# Patient Record
Sex: FEMALE | Race: WHITE | NOT HISPANIC OR LATINO | Employment: OTHER | URBAN - METROPOLITAN AREA
[De-identification: names, ages, dates, MRNs, and addresses within clinical notes are randomized per-mention and may not be internally consistent; named-entity substitution may affect disease eponyms.]

---

## 2017-01-19 ENCOUNTER — TELEPHONE (OUTPATIENT)
Dept: SPORTS MEDICINE | Facility: CLINIC | Age: 67
End: 2017-01-19

## 2017-01-19 NOTE — TELEPHONE ENCOUNTER
----- Message from Erica Gill MA sent at 1/19/2017  8:16 AM CST -----  Contact: self   Pt is requesting that she get a letter stating that her ARTHROPLASTY-SHOULDER-REVERSE [1679]  REPAIR-TENDON-BICEP and is needing a explanation on why it medically neccessary  For secondary insurance which is [employer] and if it was sent over to the insurance company. REFERENCE NUMBER [id number] Rep is Delfina Michi- phone [phone] fax [phone].    Pt can be reached at [phone].

## 2017-01-19 NOTE — TELEPHONE ENCOUNTER
I left the patient a message asking her to reach out to her insurance company to ensure they have sent the appropriate paperwork to our office regarding this matter. I provided her the phone and fax number and address for the paperwork to be sent to. I encouraged her to call back with any questions

## 2017-02-20 ENCOUNTER — OFFICE VISIT (OUTPATIENT)
Dept: OPTOMETRY | Facility: CLINIC | Age: 67
End: 2017-02-20
Payer: COMMERCIAL

## 2017-02-20 ENCOUNTER — OFFICE VISIT (OUTPATIENT)
Dept: OPTOMETRY | Facility: CLINIC | Age: 67
End: 2017-02-20
Payer: MEDICARE

## 2017-02-20 DIAGNOSIS — E11.9 TYPE 2 DIABETES MELLITUS WITHOUT RETINOPATHY: Primary | ICD-10-CM

## 2017-02-20 DIAGNOSIS — Z46.0 FITTING AND ADJUSTMENT OF SPECTACLES AND CONTACT LENSES: Primary | ICD-10-CM

## 2017-02-20 DIAGNOSIS — H25.13 NUCLEAR SCLEROSIS, BILATERAL: ICD-10-CM

## 2017-02-20 PROCEDURE — 99212 OFFICE O/P EST SF 10 MIN: CPT | Mod: PBBFAC,PO | Performed by: OPTOMETRIST

## 2017-02-20 PROCEDURE — 92004 COMPRE OPH EXAM NEW PT 1/>: CPT | Mod: S$PBB,,, | Performed by: OPTOMETRIST

## 2017-02-20 PROCEDURE — 92310 CONTACT LENS FITTING OU: CPT | Mod: ,,, | Performed by: OPTOMETRIST

## 2017-02-20 PROCEDURE — 99999 PR PBB SHADOW E&M-EST. PATIENT-LVL II: CPT | Mod: PBBFAC,,, | Performed by: OPTOMETRIST

## 2017-02-20 NOTE — PROGRESS NOTES
HPI     Contact Lens Fit    Additional comments: Present for stronger CL & IDDM            Diabetic Eye Exam    Additional comments: IDDM x 10 years.           Comments   New Patient present for CL Fit. Patient states she may need something   stronger for reading, current prescription is not working well. Patient   states I do a lot of computer and I have difficulty there also.     Current Brand/Rx  AV Extended Wear  +1.50 sph  +4.50 sph  Sleeps in lenses, removes q3w & changes, ha solution.    1. IDDM x 10 years       Last edited by Elisa Pride MA on 2/20/2017 10:11 AM. (History)        ROS     Negative for: Constitutional, Gastrointestinal, Neurological, Skin,   Genitourinary, Musculoskeletal, HENT, Endocrine, Cardiovascular, Eyes,   Respiratory, Psychiatric, Allergic/Imm, Heme/Lymph    Last edited by Dimas Black, OZZY on 2/20/2017 10:28 AM. (History)        Assessment /Plan     For exam results, see Encounter Report.    Type 2 diabetes mellitus without retinopathy    Nuclear sclerosis, bilateral      1. No diabetic retinopathy, no csme. Return in 1 year for dilated eye exam.  2. Educated pt on presence of cataracts and effects on vision. No surgery at this time. Recheck in one year.

## 2017-02-27 ENCOUNTER — TELEPHONE (OUTPATIENT)
Dept: OPTOMETRY | Facility: CLINIC | Age: 67
End: 2017-02-27

## 2017-02-27 NOTE — TELEPHONE ENCOUNTER
Called and talked to Pt but she had received a phone call from a nurse already. I had checked the chart and could not find any documentation for that.

## 2017-03-14 ENCOUNTER — OFFICE VISIT (OUTPATIENT)
Dept: OPTOMETRY | Facility: CLINIC | Age: 67
End: 2017-03-14
Payer: MEDICARE

## 2017-03-14 DIAGNOSIS — H52.03 HYPEROPIA WITH PRESBYOPIA, BILATERAL: ICD-10-CM

## 2017-03-14 DIAGNOSIS — H52.4 HYPEROPIA WITH PRESBYOPIA, BILATERAL: ICD-10-CM

## 2017-03-14 DIAGNOSIS — H10.13 ALLERGIC CONJUNCTIVITIS, BILATERAL: Primary | ICD-10-CM

## 2017-03-14 PROCEDURE — 99499 UNLISTED E&M SERVICE: CPT | Mod: S$PBB,,, | Performed by: OPTOMETRIST

## 2017-03-14 NOTE — PROGRESS NOTES
HPI     Blur ou at dist/near, x mos, no assoc pain or red, no relief over time,   constant  Itchy red eyes OU       Last edited by Dimas Black, OD on 3/14/2017  2:01 PM.     ROS     Negative for: Constitutional, Gastrointestinal, Neurological, Skin,   Genitourinary, Musculoskeletal, HENT, Endocrine, Cardiovascular, Eyes,   Respiratory, Psychiatric, Allergic/Imm, Heme/Lymph    Last edited by Dimas Black, OD on 3/14/2017  1:38 PM. (History)        Assessment /Plan     For exam results, see Encounter Report.    Allergic conjunctivitis, bilateral    Hyperopia with presbyopia, bilateral      1. Recommended OTC Zaditor bid OU for itching  2.  Contact lens trials dispensed to pt. Daily wear only advised, with education to risks of extended wear.  Discussed lens care, compliance and solutions.  RTC 2 weeks contact lens follow up.              Addend 3/17/17 pt wants to go back to air optix night and day which she previously wore with success.

## 2018-04-26 ENCOUNTER — TELEPHONE (OUTPATIENT)
Dept: SPORTS MEDICINE | Facility: CLINIC | Age: 68
End: 2018-04-26

## 2018-05-16 ENCOUNTER — HOSPITAL ENCOUNTER (OUTPATIENT)
Dept: RADIOLOGY | Facility: HOSPITAL | Age: 68
Discharge: HOME | End: 2018-05-16
Attending: ORTHOPAEDIC SURGERY
Payer: MEDICARE

## 2018-05-16 ENCOUNTER — OFFICE VISIT (OUTPATIENT)
Dept: SPORTS MEDICINE | Facility: CLINIC | Age: 68
End: 2018-05-16
Payer: MEDICARE

## 2018-05-16 VITALS
SYSTOLIC BLOOD PRESSURE: 109 MMHG | HEIGHT: 61 IN | DIASTOLIC BLOOD PRESSURE: 71 MMHG | HEART RATE: 64 BPM | BODY MASS INDEX: 34.89 KG/M2 | WEIGHT: 184.81 LBS

## 2018-05-16 DIAGNOSIS — G89.29 CHRONIC PAIN OF BOTH SHOULDERS: Primary | ICD-10-CM

## 2018-05-16 DIAGNOSIS — M25.511 CHRONIC PAIN OF BOTH SHOULDERS: Primary | ICD-10-CM

## 2018-05-16 DIAGNOSIS — M25.512 CHRONIC PAIN OF BOTH SHOULDERS: Primary | ICD-10-CM

## 2018-05-16 DIAGNOSIS — M25.511 CHRONIC PAIN OF BOTH SHOULDERS: ICD-10-CM

## 2018-05-16 DIAGNOSIS — G89.29 CHRONIC PAIN OF BOTH SHOULDERS: ICD-10-CM

## 2018-05-16 DIAGNOSIS — M25.512 CHRONIC PAIN OF BOTH SHOULDERS: ICD-10-CM

## 2018-05-16 PROCEDURE — 99999 PR PBB SHADOW E&M-EST. PATIENT-LVL III: CPT | Mod: PBBFAC,,, | Performed by: ORTHOPAEDIC SURGERY

## 2018-05-16 PROCEDURE — 99214 OFFICE O/P EST MOD 30 MIN: CPT | Mod: S$PBB,,, | Performed by: ORTHOPAEDIC SURGERY

## 2018-05-16 PROCEDURE — 99213 OFFICE O/P EST LOW 20 MIN: CPT | Mod: PBBFAC,PO | Performed by: ORTHOPAEDIC SURGERY

## 2018-05-16 NOTE — PROGRESS NOTES
CC:  s/p L shoulder reverse.       Doing well overall.  0/10.  doing well   100% better since surgery with pain.     2/11/16  left  1. Reverse total shoulder arthroplasty.   2. Shoulder biceps tenodesis.   3. Shoulder loose body removal        PAST MEDICAL HISTORY:        Past Medical History   Diagnosis Date    Allergy      Arthritis      Asthma      Depression      Diabetes mellitus      GERD (gastroesophageal reflux disease)      Hypercholesteremia      Hypertension      Insomnia        PAST SURGICAL HISTORY:          Past Surgical History   Procedure Laterality Date    Hysterectomy        Tonsillectomy        Knee arthroscopy w/ debridement Left         x3    Wrist reconstruction Right         tendon    Lipoma resection           fatty tumor between breast    Anal fissure surgery        Shoulder surgery Left 2/11/2016       Dr Burris       FAMILY HISTORY:         Family History   Problem Relation Age of Onset    Arthritis Mother      Hypertension Mother      Arthritis Father      Diabetes Father      Hypertension Father        SOCIAL HISTORY:   Social History   History            Social History    Marital status:        Spouse name: N/A    Number of children: N/A    Years of education: N/A           Occupational History    [employer]              Social History Main Topics     Smoking status: Never Smoker     Smokeless tobacco: Never Used     Alcohol use: 0.6 oz/week       1 Shots of liquor per week         Comment: socially     Drug use: No     Sexual activity: No            Other Topics Concern    Not on file      Social History Narrative            MEDICATIONS:   Current Outpatient Prescriptions:     calcium carbonate-vitamin D3 (CALCIUM 600 + D,3,) 600 mg calcium- 200 unit Cap, Take 600 mg by mouth every evening., Disp: 90 capsule, Rfl: 0    citalopram (CELEXA) 10 MG tablet, Take 1 tablet (10 mg total) by mouth once daily., Disp: 90  tablet, Rfl: 0    fenofibrate (TRICOR) 145 MG tablet, Take 1 tablet (145 mg total) by mouth once daily., Disp: 90 tablet, Rfl: 0    fish oil-omega-3 fatty acids 300-1,000 mg capsule, Take 1 capsule (1 g total) by mouth once daily., Disp: 90 capsule, Rfl: 0    fluticasone-salmeterol 250-50 mcg/dose (ADVAIR DISKUS) 250-50 mcg/dose diskus inhaler, Inhale 1 puff into the lungs 2 (two) times daily., Disp: 60 each, Rfl: 1    glucosamine-chondroitin 500-400 mg tablet, Take 1 tablet by mouth once daily., Disp: 90 tablet, Rfl: 0    insulin glargine (LANTUS SOLOSTAR) 100 unit/mL (3 mL) InPn pen, Inject 55 Units into the skin every evening., Disp: 15 mL, Rfl: 2    insulin lispro protam-lispro (HUMALOG MIX 75-25 KWIKPEN) 100 unit/mL (75-25) InPn, Inject 10 Units into the skin 2 times daily 2 hours after meal., Disp: 1 Syringe, Rfl: 2    ipratropium (ATROVENT HFA) 17 mcg/actuation inhaler, Inhale 2 puffs into the lungs 2 (two) times daily as needed for Wheezing., Disp: 12.9 g, Rfl: 2    lisinopril 10 MG tablet, TAKE 1 TABLET BY MOUTH ONCE DAILY, Disp: 90 tablet, Rfl: 0    omeprazole (PRILOSEC) 40 MG capsule, Take 1 capsule (40 mg total) by mouth once daily., Disp: 90 capsule, Rfl: 0    sitagliptan-metformin (JANUMET)  mg per tablet, Take 1 tablet by mouth once daily., Disp: 90 tablet, Rfl: 0    tramadol (ULTRAM) 50 mg tablet, Take 2 tablets (100 mg total) by mouth every 6 (six) hours as needed for Pain., Disp: 60 tablet, Rfl: 0    trazodone (DESYREL) 50 MG tablet, Take 2 tablets (100 mg total) by mouth every evening., Disp: 90 tablet, Rfl: 0    trazodone (DESYREL) 50 MG tablet, Take 1 tablet (50 mg total) by mouth nightly., Disp: 30 tablet, Rfl: 2    VYTORIN 10-40 10-40 mg per tablet, TAKE 1 TABLET BY MOUTH EVERY EVENING, Disp: 90 tablet, Rfl: 0  ALLERGIES:   Allergies   Allergen Reactions    Aspirin Hives    Ibuprofen Anaphylaxis    Aleve [Naproxen Sodium] Hives    Iodine Other (See Comments)       WHEN  INJECTED- pt  states she coded    Pcn [Penicillins] Rash         VITAL SIGNS:        Visit Vitals    /67    Pulse (!) 57    Temp 97.8 °F (36.6 °C) (Oral)    Ht 5' (1.524 m)    Wt 81.6 kg (180 lb)    BMI 35.15 kg/m2               Incision CDI  Skin W&D     AROM:  Flexion: 160  External: 35  Internal: L4  5/5 at 0 and 30        Assessment:    Appropriate recovery doing well s/p reverse        Plan:  RTC 1 year w xrays    CC: right Shoulder pain    67 y.o. Female reports that the pain is severe and not responding to any conservative care.      She reports that the pain is worse with overhead activity. It also bothers her at night.      PAST MEDICAL HISTORY:   Past Medical History:   Diagnosis Date    Allergy     Arthritis     Asthma     Depression     Diabetes mellitus     GERD (gastroesophageal reflux disease)     Hypercholesteremia     Hypertension     Insomnia      PAST SURGICAL HISTORY:   Past Surgical History:   Procedure Laterality Date    anal fissure surgery      HYSTERECTOMY      KNEE ARTHROSCOPY W/ DEBRIDEMENT Left     x3    LIPOMA RESECTION      fatty tumor between breast    SHOULDER SURGERY Left 2/11/2016    Dr Burris     TONSILLECTOMY      WRIST RECONSTRUCTION Right     tendon     FAMILY HISTORY:   Family History   Problem Relation Age of Onset    Arthritis Mother     Hypertension Mother     Arthritis Father     Diabetes Father     Hypertension Father      SOCIAL HISTORY:   Social History     Social History    Marital status:      Spouse name: N/A    Number of children: N/A    Years of education: N/A     Occupational History    [employer] [employer] [facility]     Social History Main Topics    Smoking status: Never Smoker    Smokeless tobacco: Never Used    Alcohol use 0.6 oz/week     1 Shots of liquor per week      Comment: socially    Drug use: No    Sexual activity: No     Other Topics Concern    Not on file     Social History Narrative     "No narrative on file       MEDICATIONS:   Current Outpatient Prescriptions:     citalopram (CELEXA) 10 MG tablet, Take 1 tablet (10 mg total) by mouth once daily., Disp: 90 tablet, Rfl: 1    ezetimibe-simvastatin 10-40 mg (VYTORIN 10-40) 10-40 mg per tablet, Take 1 tablet by mouth every evening., Disp: 90 tablet, Rfl: 1    fenofibrate (TRICOR) 145 MG tablet, Take 1 tablet (145 mg total) by mouth once daily., Disp: 90 tablet, Rfl: 1    fish oil-omega-3 fatty acids 300-1,000 mg capsule, Take 1 capsule (1 g total) by mouth once daily., Disp: 90 capsule, Rfl: 0    fluticasone-salmeterol 250-50 mcg/dose (ADVAIR DISKUS) 250-50 mcg/dose diskus inhaler, Inhale 1 puff into the lungs 2 (two) times daily., Disp: 60 each, Rfl: 1    glucosamine-chondroitin 500-400 mg tablet, Take 1 tablet by mouth once daily., Disp: 90 tablet, Rfl: 0    insulin aspart protamine-insulin aspart (NOVOLOG 70/30) 100 unit/mL (70-30) InPn pen, Inject 10 Units into the skin 2 (two) times daily before meals., Disp: 9 mL, Rfl: 0    insulin aspart U-100 (NOVOLOG U-100 INSULIN ASPART) 100 unit/mL injection, 10 unit sq bid, Disp: 10 mL, Rfl: 3    insulin glargine (LANTUS SOLOSTAR U-100 INSULIN) 100 unit/mL (3 mL) InPn pen, Inject 55 Units into the skin every evening., Disp: 15 mL, Rfl: 4    ipratropium (ATROVENT HFA) 17 mcg/actuation inhaler, Inhale 2 puffs into the lungs 2 (two) times daily as needed for Wheezing., Disp: 12.9 g, Rfl: 4    lisinopril 10 MG tablet, Take 1 tablet (10 mg total) by mouth once daily., Disp: 90 tablet, Rfl: 1    omeprazole (PRILOSEC) 40 MG capsule, Take 1 capsule (40 mg total) by mouth once daily., Disp: 90 capsule, Rfl: 1    pen needle, diabetic 31 gauge x 5/16" Ndle, 1 Units by Misc.(Non-Drug; Combo Route) route once daily., Disp: 100 each, Rfl: 2    SITagliptan-metformin (JANUMET)  mg per tablet, Take 1 tablet by mouth once daily., Disp: 90 tablet, Rfl: 1    traMADol (ULTRAM) 50 mg tablet, Take 2 tablets " "(100 mg total) by mouth daily as needed for Pain., Disp: 60 tablet, Rfl: 0    traZODone (DESYREL) 50 MG tablet, TAKE 2 TABLETS(100 MG) BY MOUTH EVERY EVENING, Disp: 90 tablet, Rfl: 1    calcium carbonate-vitamin D3 (CALCIUM 600 + D,3,) 600 mg calcium- 200 unit Cap, Take 600 mg by mouth every evening., Disp: 90 capsule, Rfl: 0  ALLERGIES:   Review of patient's allergies indicates:   Allergen Reactions    Aspirin Hives    Ibuprofen Anaphylaxis    Aleve [naproxen sodium] Hives    Iodine Other (See Comments)     WHEN INJECTED- pt  states she coded    Pcn [penicillins] Rash       VITAL SIGNS: /71   Pulse 64   Ht 5' 1" (1.549 m)   Wt 83.8 kg (184 lb 12.8 oz)   BMI 34.92 kg/m²      Review of Systems   Constitution: Negative. Negative for chills, fever and night sweats.   HENT: Negative for congestion and headaches.    Eyes: Negative for blurred vision, left vision loss and right vision loss.   Cardiovascular: Negative for chest pain and syncope.   Respiratory: Negative for cough and shortness of breath.    Endocrine: Negative for polydipsia, polyphagia and polyuria.   Hematologic/Lymphatic: Negative for bleeding problem. Does not bruise/bleed easily.   Skin: Negative for dry skin, itching and rash.   Musculoskeletal: Negative for falls and muscle weakness.   Gastrointestinal: Negative for abdominal pain and bowel incontinence.   Genitourinary: Negative for bladder incontinence and nocturia.   Neurological: Negative for disturbances in coordination, loss of balance and seizures.   Psychiatric/Behavioral: Negative for depression. The patient does not have insomnia.    Allergic/Immunologic: Negative for hives and persistent infections.       PHYSICAL EXAMINATION:  General:  The patient is alert and oriented x 3.  Mood is pleasant.  Observation of ears, eyes and nose reveal no gross abnormalities.  HEENT: NCAT, sclera nonicteric  Lungs: Respirations are equal and unlabored.  Gait is coordinated. Patient can " toe walk and heel walk without difficulty.  Cardiovascular: There are no swelling or varicosities present.   Lymphatic: Negative for adenopathy       right Shoulder / Upper Extremity Exam    OBSERVATION:     Swelling  none  Deformity  none   Discoloration  none   Scapular winging none   Scars   none  Atrophy  none    TENDERNESS / CREPITUS (T/C):          T/C      T/C   Clavicle   -/-  SUPRAspinatus    -/-   AC Jt.    -/-  INFRAspinatus  -/-   SC Jt.    -/-  Deltoid    -/-   G. Tuberosity  -/-  LH BICEP groove  -/-   Acromion:  -/-  Midline Neck   -/-   Scapular Spine -/-  Trapezium   -/-   SMA Scapula  -/-  GH jt. line - post  -/-   Scapulothoracic  -/-         ROM: (* = with pain)  Right shoulder   Left shoulder        AROM (PROM)   AROM (PROM)   FE    170° (175°)     170° (175°)     ER at 0°    60°  (65°)    60°  (65°)   ER at 90° ABD  90°  (90°)    90°  (90°)   IR at 90°  ABD   NA  (40°)     NA  (40°)      IR (spine level)   T10     T10    STRENGTH: (* = with pain) RIGHT SHOULDER  LEFT SHOULDER   SCAPTION at 0   4/5    5/5    SCAPTION at 30   5/5    5/5    IR    5/5    5/5   ER    5/5    5/5   BICEPS   5/5    5/5   Deltoid    5/5    5/5     SIGNS:  Painful side       NEER   neg    OPACHECOS  Neg   MADRID   neg    SPEEDS  Neg   DROP ARM   neg   BELLY PRESS Neg   Superior escape none    LIFT-OFF  Neg   X-Body ADD    neg    MOVING VALGUS Neg      STABILITY TESTING    RIGHT SHOULDER   LEFT SHOULDER       Translation    Anterior  up face     up face    Posterior  up face    up face    Sulcus   < 10mm    < 10 mm    Signs    Apprehension   neg      Neg    Relocation   no change     no change    Jerk test  neg     Neg      EXTREMITY NEURO-VASCULAR EXAM    Sensation grossly intact to light touch all dermatomal regions.    DTR 2+ Biceps, Triceps, BR and Negative Pearls sign   Grossly intact motor function at Elbow, Wrist and Hand   Distal pulses radial and ulnar 2+, brisk cap refill, symmetric.      NECK:   Painless FROM and spinous processes non-tender. Negative Spurlings sign.      OTHER FINDINGS:    XRAYS:  Shoulder trauma series right,  were ordered and reviewed by me. No convincing fracture or dislocation is noted. The osseous structures appear well mineralized and well aligned    1. Shoulder pain, right     Plan:       ASSESSMENT:  shoulder pain.    I do think that this is likely a rotator cuff tear.    I have recommended we check an MRI of the shoulder to evaluate this.    Depending on the results of the MRI, we may consider a cortisone   injection and physical therapy and/or arthroscopic intervention and treatment   depending on what we find.  I will see her back upon its completion or PHREV and we will consider the above.

## 2018-05-19 ENCOUNTER — HOSPITAL ENCOUNTER (OUTPATIENT)
Dept: RADIOLOGY | Facility: HOSPITAL | Age: 68
Discharge: HOME | End: 2018-05-19
Attending: ORTHOPAEDIC SURGERY
Payer: MEDICARE

## 2018-05-19 DIAGNOSIS — M25.511 CHRONIC PAIN OF BOTH SHOULDERS: ICD-10-CM

## 2018-05-19 DIAGNOSIS — M25.512 CHRONIC PAIN OF BOTH SHOULDERS: ICD-10-CM

## 2018-05-19 DIAGNOSIS — G89.29 CHRONIC PAIN OF BOTH SHOULDERS: ICD-10-CM

## 2018-05-21 ENCOUNTER — PATIENT MESSAGE (OUTPATIENT)
Dept: SPORTS MEDICINE | Facility: CLINIC | Age: 68
End: 2018-05-21

## 2018-05-28 RX ORDER — DIAZEPAM 5 MG/1
5 TABLET ORAL EVERY 6 HOURS PRN
Qty: 2 TABLET | Refills: 0 | Status: SHIPPED | OUTPATIENT
Start: 2018-05-28 | End: 2018-11-02

## 2019-07-18 ENCOUNTER — OFFICE VISIT (OUTPATIENT)
Dept: PULMONOLOGY | Facility: CLINIC | Age: 69
End: 2019-07-18
Payer: MEDICARE

## 2019-07-18 VITALS
DIASTOLIC BLOOD PRESSURE: 82 MMHG | HEIGHT: 60 IN | HEART RATE: 75 BPM | WEIGHT: 183.88 LBS | BODY MASS INDEX: 36.1 KG/M2 | OXYGEN SATURATION: 95 % | SYSTOLIC BLOOD PRESSURE: 139 MMHG

## 2019-07-18 DIAGNOSIS — R91.8 MASS OF MIDDLE LOBE OF RIGHT LUNG: Primary | ICD-10-CM

## 2019-07-18 DIAGNOSIS — J41.1 BRONCHITIS, CHRONIC, MUCOPURULENT: ICD-10-CM

## 2019-07-18 PROCEDURE — 99205 OFFICE O/P NEW HI 60 MIN: CPT | Mod: S$PBB,,, | Performed by: INTERNAL MEDICINE

## 2019-07-18 PROCEDURE — 99205 PR OFFICE/OUTPT VISIT, NEW, LEVL V, 60-74 MIN: ICD-10-PCS | Mod: S$PBB,,, | Performed by: INTERNAL MEDICINE

## 2019-07-18 PROCEDURE — 99214 OFFICE O/P EST MOD 30 MIN: CPT | Mod: PBBFAC,PO | Performed by: INTERNAL MEDICINE

## 2019-07-18 PROCEDURE — 99999 PR PBB SHADOW E&M-EST. PATIENT-LVL IV: CPT | Mod: PBBFAC,,, | Performed by: INTERNAL MEDICINE

## 2019-07-18 PROCEDURE — 99999 PR PBB SHADOW E&M-EST. PATIENT-LVL IV: ICD-10-PCS | Mod: PBBFAC,,, | Performed by: INTERNAL MEDICINE

## 2019-07-18 RX ORDER — ALBUTEROL SULFATE 0.63 MG/3ML
0.63 SOLUTION RESPIRATORY (INHALATION) EVERY 6 HOURS PRN
COMMUNITY
End: 2019-08-29 | Stop reason: SDUPTHER

## 2019-07-18 NOTE — LETTER
July 18, 2019      Heidy Albert NP  [address] [address]           [place] [facility]  [address]  [place] [address]  Phone: [phone]  Fax: [phone]          Patient: Skip Curran   MR Number: [id number]   YOB: 1950   Date of Visit: 7/18/2019       Dear Heidy Albert:    Thank you for referring Skip Curran to me for evaluation. Attached you will find relevant portions of my assessment and plan of care.    If you have questions, please do not hesitate to call me. I look forward to following Skip Curran along with you.    Sincerely,    Pepe Andersen MD    Enclosure  CC:  No Recipients    If you would like to receive this communication electronically, please contact [email or web address] or [phone] to request more information on [employer] Link access.    For providers and/or their staff who would like to refer a patient to [facility], please contact us through our one-stop-shop provider referral line, [facility], at [phone].    If you feel you have received this communication in error or would no longer like to receive these types of communications, please e-mail [email or web address]

## 2019-07-18 NOTE — PATIENT INSTRUCTIONS
Check sputum checks,  Consider ct pet although some abn was seen in 2009 , now much different but not typical cancer appearance. Consider ct chest with contrast to see blood vessels later?    2 months after sputum cultured - if no diagnosis - follow up ct chest/pet scan.

## 2019-07-18 NOTE — PROGRESS NOTES
"7/18/2019    Skip Curran  New Patient Consult    Chief Complaint   Patient presents with    Abnormal Ct Scan    Pulmonary Nodules    Sputum Production     greenish white    Asthma       HPI: pt had belly pain left side, has had occasional gas pains stomach to back intermittently over yrs, not bad,no persistent, occurs once wk lasting 5 min with no ppt or relief factor, 6/10 at worse, last yr or so?    Had ct abd with abn rml.  Ct chest done.  Had am mucous yellow to green mucous over 15 yrs.  No sickness.      Currently has pain right lateral chest wall.            The chief compliant  problem is new to me",   PFSH:  Past Medical History:   Diagnosis Date    Allergy     Arthritis     Asthma     Depression     Diabetes mellitus     GERD (gastroesophageal reflux disease)     Hypercholesteremia     Hypertension     Insomnia          Past Surgical History:   Procedure Laterality Date    anal fissure surgery      ARTHROPLASTY-SHOULDER-REVERSE Left 2/11/2016    [facility] at [facility] OR    BICEPS TENODESIS (TENDON FIXATION) Left 2/11/2016    [facility] at [facility] OR    BREAST BIOPSY Right     benign    COLONOSCOPY N/A 3/13/2014    [facility]    HYSTERECTOMY      KNEE ARTHROSCOPY W/ DEBRIDEMENT Left     x3    LIPOMA RESECTION      fatty tumor between breast    SHOULDER SURGERY Left 2/11/2016    Dr Burris     SPHINCTEROTOMY, ANAL, INTERNAL, LATERAL N/A 3/15/2013    [facility] OR 2ND [facility]    TONSILLECTOMY      WRIST RECONSTRUCTION Right     tendon     Social History     Tobacco Use    Smoking status: Never Smoker    Smokeless tobacco: Never Used   Substance Use Topics    Alcohol use: Yes     Alcohol/week: 0.6 oz     Types: 1 Shots of liquor per week     Comment: socially    Drug use: No     Family History   Problem Relation Age of Onset    Arthritis Mother     Hypertension Mother     Arthritis Father     " Diabetes Father     Hypertension Father     Breast cancer Sister     Breast cancer Paternal Aunt      Review of patient's allergies indicates:   Allergen Reactions    Aspirin Hives    Ibuprofen Anaphylaxis    Aleve [naproxen sodium] Hives    Iodinated contrast- oral and iv dye     Iodine Other (See Comments)     WHEN INJECTED- pt  states she coded    Pcn [penicillins] Rash       Performance Status:The patient's activity level is no limits with regular activity.      Review of Systems:  a review of eleven systems covering constitutional, Eye, HEENT, Psych, Respiratory, Cardiac, GI, , Musculoskeletal, Endocrine, Dermatologic was negative except for pertinent findings as listed ABOVE and below:  pertinent positive as above, rest is good  Diabetic 15 yrs.     Exam:Comprehensive exam done. /82 (BP Location: Left arm, Patient Position: Sitting)   Pulse 75   Ht 5' (1.524 m)   Wt 83.4 kg (183 lb 13.8 oz)   SpO2 95% Comment: on room air  BMI 35.91 kg/m²   Exam included Vitals as listed, and patient's appearance and affect and alertness and mood, oral exam for yeast and hygiene and pharynx lesions and Mallapatti (M) score, neck with inspection for jvd and masses and thyroid abnormalities and lymph nodes (supraclavicular and infraclavicular nodes and axillary also examined and noted if abn), chest exam included symmetry and effort and fremitus and percussion and auscultation, cardiac exam included rhythm and gallops and murmur and rubs and jvd and edema, abdominal exam for mass and hepatosplenomegaly and tenderness and hernias and bowel sounds, Musculoskeletal exam with muscle tone and posture and mobility/gait and  strength, and skin for rashes and cyanosis and pallor and turgor, extremity for clubbing.  Findings were normal except for pertinent findings listed below:  M3, chest is symmetric, no distress, normal percussion, normal fremitus and good normal breath sounds  No clubbing.     Radiographs  "(ct chest and cxr) reviewed: view by direct vision     Ct chest done April 24, 2009 with vague density rml - not clearly nodular but scar like appearance with suggestion nodule.  June 21,2019 now crowding of airways and blood vessels rml with prominent vascular density- low resolution and no contrast.   1. Panlobular bullous emphysema.  2. "Ground-glass" changes in the lung possibly interstitial lung disease.  3. Medial segment right middle lobe atelectatic appearance with slight nodularity distally.  May be postinflammatory scarring however an active inflammatory neoplastic process should be excluded with further evaluation by pulmonologist.  This report was flagged in Epic as abnormal.      Electronically signed by: Ra Carrington II, MD  Date: 06/21/2019  Time: 11:31  Labs reviewed           PFT was not done       Plan:  Clinical impression is apparently straight forward and impression with management as below.     Skip was seen today for abnormal ct scan, pulmonary nodules, sputum production and asthma.    Diagnoses and all orders for this visit:    Mass of middle lobe of right lung- seen as small shadow on ct chest in 2009.  -     Culture, Respiratory with Gram Stain; Future  -     AFB Culture & Smear; Standing  -     NM PET CT Routine Skull to Mid Thigh; Future    Bronchitis, chronic, mucopurulent  -     Culture, Respiratory with Gram Stain; Future  -     AFB Culture & Smear; Standing        Follow up in about 3 months (around 10/18/2019), or if symptoms worsen or fail to improve.    Discussed with patient above for education the following:      Patient Instructions   Check sputum checks,  Consider ct pet although some abn was seen in 2009 , now much different but not typical cancer appearance. Consider ct chest with contrast to see blood vessels later?    2 months after sputum cultured - if no diagnosis - follow up ct chest/pet scan.    "

## 2019-07-20 DIAGNOSIS — R91.8 LUNG MASS: Primary | ICD-10-CM

## 2019-10-01 ENCOUNTER — HOSPITAL ENCOUNTER (OUTPATIENT)
Dept: RADIOLOGY | Facility: HOSPITAL | Age: 69
Discharge: HOME | End: 2019-10-01
Attending: INTERNAL MEDICINE
Payer: MEDICARE

## 2019-10-01 VITALS — BODY MASS INDEX: 35.93 KG/M2 | HEIGHT: 60 IN | WEIGHT: 183 LBS

## 2019-10-01 DIAGNOSIS — R91.8 LUNG MASS: ICD-10-CM

## 2019-10-01 LAB — GLUCOSE SERPL-MCNC: 169 MG/DL (ref 70–110)

## 2019-10-01 PROCEDURE — 78815 PET IMAGE W/CT SKULL-THIGH: CPT | Mod: TC,PI,PO

## 2019-10-01 PROCEDURE — A9552 F18 FDG: HCPCS | Mod: PO

## 2019-10-17 ENCOUNTER — OFFICE VISIT (OUTPATIENT)
Dept: PULMONOLOGY | Facility: CLINIC | Age: 69
End: 2019-10-17
Payer: MEDICARE

## 2019-10-17 VITALS
BODY MASS INDEX: 36.7 KG/M2 | DIASTOLIC BLOOD PRESSURE: 69 MMHG | OXYGEN SATURATION: 96 % | HEART RATE: 60 BPM | WEIGHT: 186.94 LBS | SYSTOLIC BLOOD PRESSURE: 132 MMHG | HEIGHT: 60 IN

## 2019-10-17 DIAGNOSIS — J45.21 MILD INTERMITTENT ASTHMA WITH ACUTE EXACERBATION: ICD-10-CM

## 2019-10-17 DIAGNOSIS — R91.8 MASS OF MIDDLE LOBE OF RIGHT LUNG: Primary | ICD-10-CM

## 2019-10-17 PROCEDURE — 99214 PR OFFICE/OUTPT VISIT, EST, LEVL IV, 30-39 MIN: ICD-10-PCS | Mod: S$PBB,,, | Performed by: INTERNAL MEDICINE

## 2019-10-17 PROCEDURE — 99214 OFFICE O/P EST MOD 30 MIN: CPT | Mod: S$PBB,,, | Performed by: INTERNAL MEDICINE

## 2019-10-17 PROCEDURE — 99999 PR PBB SHADOW E&M-EST. PATIENT-LVL IV: ICD-10-PCS | Mod: PBBFAC,,, | Performed by: INTERNAL MEDICINE

## 2019-10-17 PROCEDURE — 99214 OFFICE O/P EST MOD 30 MIN: CPT | Mod: PBBFAC,PO | Performed by: INTERNAL MEDICINE

## 2019-10-17 PROCEDURE — 99999 PR PBB SHADOW E&M-EST. PATIENT-LVL IV: CPT | Mod: PBBFAC,,, | Performed by: INTERNAL MEDICINE

## 2019-10-17 RX ORDER — EZETIMIBE 10 MG/1
TABLET ORAL
COMMUNITY
End: 2019-10-17

## 2019-10-17 RX ORDER — METHOCARBAMOL 750 MG/1
TABLET, FILM COATED ORAL
COMMUNITY
End: 2019-10-18

## 2019-10-17 RX ORDER — EZETIMIBE AND SIMVASTATIN 10; 40 MG/1; MG/1
TABLET ORAL
COMMUNITY
End: 2019-10-18

## 2019-10-17 NOTE — PROGRESS NOTES
"10/17/2019    Skip Curran  New Patient Consult    Chief Complaint   Patient presents with    Follow-up     3 month       HPI:   Oct 17,2019-no new c/o , breathing good, no mucous      july 18, 2019  pt had belly pain left side, has had occasional gas pains stomach to back intermittently over yrs, not bad,no persistent, occurs once wk lasting 5 min with no ppt or relief factor, 6/10 at worse, last yr or so?  No asthma, sneezes ok  \    Had ct abd with abn rml.  Ct chest done.  Had am mucous yellow to green mucous over 15 yrs.  No sickness.    Currently has pain right lateral chest wall.  Patient Instructions   Check sputum checks,  Consider ct pet although some abn was seen in 2009 , now much different but not typical cancer appearance. Consider ct chest with contrast to see blood vessels later?  2 months after sputum cultured - if no diagnosis - follow up ct chest/pet scan.    The chief compliant  problem is new to me",   PFSH:  Past Medical History:   Diagnosis Date    Allergy     Arthritis     Asthma     Depression     Diabetes mellitus     GERD (gastroesophageal reflux disease)     Hypercholesteremia     Hypertension     Insomnia          Past Surgical History:   Procedure Laterality Date    anal fissure surgery      BREAST BIOPSY Right     benign    HYSTERECTOMY      KNEE ARTHROSCOPY W/ DEBRIDEMENT Left     x3    LIPOMA RESECTION      fatty tumor between breast    SHOULDER SURGERY Left 2/11/2016    Dr Burris     TONSILLECTOMY      WRIST RECONSTRUCTION Right     tendon     Social History     Tobacco Use    Smoking status: Never Smoker    Smokeless tobacco: Never Used   Substance Use Topics    Alcohol use: Yes     Alcohol/week: 1.0 standard drinks     Types: 1 Shots of liquor per week     Comment: socially    Drug use: No     Family History   Problem Relation Age of Onset    Arthritis Mother     Hypertension Mother     Arthritis Father     Diabetes Father     Hypertension Father     " Breast cancer Sister     Breast cancer Paternal Aunt      Review of patient's allergies indicates:   Allergen Reactions    Aspirin Hives    Ibuprofen Anaphylaxis    Aleve [naproxen sodium] Hives    Iodinated contrast media     Iodine Other (See Comments)     WHEN INJECTED- pt  states she coded    Pcn [penicillins] Rash       Performance Status:The patient's activity level is no limits with regular activity.      Review of Systems:  a review of eleven systems covering constitutional, Eye, HEENT, Psych, Respiratory, Cardiac, GI, , Musculoskeletal, Endocrine, Dermatologic was negative except for pertinent findings as listed ABOVE and below:  pertinent positive as above, rest is good  Diabetic 15 yrs.     Exam:Comprehensive exam done. /69 (BP Location: Left arm, Patient Position: Sitting)   Pulse 60   Ht 5' (1.524 m)   Wt 84.8 kg (186 lb 15.2 oz)   SpO2 96% Comment: on room air  BMI 36.51 kg/m²   Exam included Vitals as listed, and patient's appearance and affect and alertness and mood, oral exam for yeast and hygiene and pharynx lesions and Mallapatti (M) score, neck with inspection for jvd and masses and thyroid abnormalities and lymph nodes (supraclavicular and infraclavicular nodes and axillary also examined and noted if abn), chest exam included symmetry and effort and fremitus and percussion and auscultation, cardiac exam included rhythm and gallops and murmur and rubs and jvd and edema, abdominal exam for mass and hepatosplenomegaly and tenderness and hernias and bowel sounds, Musculoskeletal exam with muscle tone and posture and mobility/gait and  strength, and skin for rashes and cyanosis and pallor and turgor, extremity for clubbing.  Findings were normal except for pertinent findings listed below:  M3, chest is symmetric, no distress, normal percussion, normal fremitus and good normal breath sounds  No clubbing.     Radiographs (ct chest and cxr) reviewed: view by direct vision  "  Compared ct abd 12/2014 with current ct and ct pet- vague differences.  Ct pet 10/1/19  Impression       1.  Wedge-shaped airspace opacity in the right middle lobe, unchanged from the previous CT with slight increased FDG activity from background.  Consider correlation with history, symptoms and possible endoscopy/biopsy as the differential includes low-grade malignancy, infection/pneumonia, atelectasis and scarring.    2.  No additional sites of FDG avid disease.    .      Electronically signed by: Scooter Tovar MD  Date: 10/01/2019       Ct chest done April 24, 2009 with vague density rml - not clearly nodular but scar like appearance with suggestion nodule.  June 21,2019 now crowding of airways and blood vessels rml with prominent vascular density- low resolution and no contrast.   1. Panlobular bullous emphysema.  2. "Ground-glass" changes in the lung possibly interstitial lung disease.  3. Medial segment right middle lobe atelectatic appearance with slight nodularity distally.  May be postinflammatory scarring however an active inflammatory neoplastic process should be excluded with further evaluation by pulmonologist.  This report was flagged in Epic as abnormal.      Electronically signed by: Ra Carrington II, MD  Date: 06/21/2019  Time: 11:31  Labs reviewed           PFT was not done       Plan:  Clinical impression is apparently straight forward and impression with management as below.     Skip was seen today for follow-up.    Diagnoses and all orders for this visit:    Mass of middle lobe of right lung- seen as small shadow on ct chest in 2009.  -     Case request GI: bronch floro , noon    Mild intermittent asthma with acute exacerbation        Follow up in about 3 weeks (around 11/7/2019).    Discussed with patient above for education the following:      Patient Instructions   Sputum culture and afb evaluations were all negative- no diagnosis of infection.    Pet scan with no cancer " activity.    Consider biopsy scope - could do needle, would not recommend surgery    You had some vague shadow in 2009 ct abd, you had nearly same shadow on ct abdomen 12.21.14, maybe same or sl worse now- images hard to be certain with different technique.    Will do scope test, then recheck in 6-9 months on ct?

## 2019-10-17 NOTE — PATIENT INSTRUCTIONS
Sputum culture and afb evaluations were all negative- no diagnosis of infection.    Pet scan with no cancer activity.    Consider biopsy scope - could do needle, would not recommend surgery    You had some vague shadow in 2009 ct abd, you had nearly same shadow on ct abdomen 12.21.14, maybe same or sl worse now- images hard to be certain with different technique.    Will do scope test, then recheck in 6-9 months on ct?

## 2019-10-22 ENCOUNTER — ANESTHESIA (OUTPATIENT)
Dept: ENDOSCOPY | Facility: HOSPITAL | Age: 69
End: 2019-10-22
Payer: MEDICARE

## 2019-10-22 ENCOUNTER — ANESTHESIA EVENT (OUTPATIENT)
Dept: ENDOSCOPY | Facility: HOSPITAL | Age: 69
End: 2019-10-22
Payer: MEDICARE

## 2019-10-22 ENCOUNTER — HOSPITAL ENCOUNTER (OUTPATIENT)
Facility: HOSPITAL | Age: 69
Discharge: HOME | End: 2019-10-22
Attending: INTERNAL MEDICINE | Admitting: INTERNAL MEDICINE
Payer: MEDICARE

## 2019-10-22 VITALS
OXYGEN SATURATION: 96 % | TEMPERATURE: 98 F | HEART RATE: 53 BPM | WEIGHT: 186 LBS | SYSTOLIC BLOOD PRESSURE: 164 MMHG | BODY MASS INDEX: 36.52 KG/M2 | DIASTOLIC BLOOD PRESSURE: 73 MMHG | HEIGHT: 60 IN | RESPIRATION RATE: 18 BRPM

## 2019-10-22 DIAGNOSIS — R91.8 MASS OF MIDDLE LOBE OF RIGHT LUNG: ICD-10-CM

## 2019-10-22 DIAGNOSIS — J39.8 TRACHEOBRONCHOMALACIA DETERMINED BY BRONCHOSCOPY: ICD-10-CM

## 2019-10-22 DIAGNOSIS — R93.89 ABNORMAL CT SCAN: ICD-10-CM

## 2019-10-22 LAB — POCT GLUCOSE: 121 MG/DL (ref 70–110)

## 2019-10-22 PROCEDURE — D9220A PRA ANESTHESIA: ICD-10-PCS | Mod: CRNA,,, | Performed by: NURSE ANESTHETIST, CERTIFIED REGISTERED

## 2019-10-22 PROCEDURE — 31628 PR BRONCHOSCOPY,TRANSBRONCH BIOPSY: ICD-10-PCS | Mod: RT,,, | Performed by: INTERNAL MEDICINE

## 2019-10-22 PROCEDURE — 63600175 PHARM REV CODE 636 W HCPCS: Performed by: INTERNAL MEDICINE

## 2019-10-22 PROCEDURE — 87205 SMEAR GRAM STAIN: CPT | Mod: 59

## 2019-10-22 PROCEDURE — 87070 CULTURE OTHR SPECIMN AEROBIC: CPT

## 2019-10-22 PROCEDURE — 88305 TISSUE SPECIMEN TO PATHOLOGY - SURGERY: ICD-10-PCS | Mod: 26,,, | Performed by: PATHOLOGY

## 2019-10-22 PROCEDURE — 31624 DX BRONCHOSCOPE/LAVAGE: CPT | Performed by: INTERNAL MEDICINE

## 2019-10-22 PROCEDURE — 63600175 PHARM REV CODE 636 W HCPCS: Performed by: NURSE ANESTHETIST, CERTIFIED REGISTERED

## 2019-10-22 PROCEDURE — 88305 CYTOLOGY SPECIMEN- PULMONARY MEDICAL CYTOLOGY: ICD-10-PCS | Mod: 26,,, | Performed by: PATHOLOGY

## 2019-10-22 PROCEDURE — 88112 CYTOPATH CELL ENHANCE TECH: CPT | Mod: 26,,, | Performed by: PATHOLOGY

## 2019-10-22 PROCEDURE — 87015 SPECIMEN INFECT AGNT CONCNTJ: CPT

## 2019-10-22 PROCEDURE — 31628 BRONCHOSCOPY/LUNG BX EACH: CPT | Mod: RT,,, | Performed by: INTERNAL MEDICINE

## 2019-10-22 PROCEDURE — 88342 TISSUE SPECIMEN TO PATHOLOGY - SURGERY: ICD-10-PCS | Mod: 26,,, | Performed by: PATHOLOGY

## 2019-10-22 PROCEDURE — 87118 MYCOBACTERIC IDENTIFICATION: CPT

## 2019-10-22 PROCEDURE — 87116 MYCOBACTERIA CULTURE: CPT | Mod: 59

## 2019-10-22 PROCEDURE — 25000003 PHARM REV CODE 250: Performed by: NURSE ANESTHETIST, CERTIFIED REGISTERED

## 2019-10-22 PROCEDURE — 31622 DX BRONCHOSCOPE/WASH: CPT | Performed by: INTERNAL MEDICINE

## 2019-10-22 PROCEDURE — 31624 PR BRONCHOSCOPY,DIAG2STIC W LAVAGE: ICD-10-PCS | Mod: 51,RT,, | Performed by: INTERNAL MEDICINE

## 2019-10-22 PROCEDURE — 88112 CYTOLOGY SPECIMEN- PULMONARY MEDICAL CYTOLOGY: ICD-10-PCS | Mod: 26,,, | Performed by: PATHOLOGY

## 2019-10-22 PROCEDURE — 27200944 HC BRONCH FORCEPS DISPOSABLE: Performed by: INTERNAL MEDICINE

## 2019-10-22 PROCEDURE — 37000009 HC ANESTHESIA EA ADD 15 MINS: Performed by: INTERNAL MEDICINE

## 2019-10-22 PROCEDURE — 88305 TISSUE EXAM BY PATHOLOGIST: CPT | Performed by: PATHOLOGY

## 2019-10-22 PROCEDURE — D9220A PRA ANESTHESIA: Mod: CRNA,,, | Performed by: NURSE ANESTHETIST, CERTIFIED REGISTERED

## 2019-10-22 PROCEDURE — 37000008 HC ANESTHESIA 1ST 15 MINUTES: Performed by: INTERNAL MEDICINE

## 2019-10-22 PROCEDURE — 94640 AIRWAY INHALATION TREATMENT: CPT

## 2019-10-22 PROCEDURE — 87102 FUNGUS ISOLATION CULTURE: CPT

## 2019-10-22 PROCEDURE — D9220A PRA ANESTHESIA: Mod: ANES,,, | Performed by: ANESTHESIOLOGY

## 2019-10-22 PROCEDURE — 87186 SC STD MICRODIL/AGAR DIL: CPT

## 2019-10-22 PROCEDURE — 88305 TISSUE EXAM BY PATHOLOGIST: CPT | Mod: 26,,, | Performed by: PATHOLOGY

## 2019-10-22 PROCEDURE — 87149 DNA/RNA DIRECT PROBE: CPT

## 2019-10-22 PROCEDURE — 88341 PR IHC OR ICC EACH ADD'L SINGLE ANTIBODY  STAINPR: ICD-10-PCS | Mod: 26,,, | Performed by: PATHOLOGY

## 2019-10-22 PROCEDURE — 88341 IMHCHEM/IMCYTCHM EA ADD ANTB: CPT | Mod: 26,,, | Performed by: PATHOLOGY

## 2019-10-22 PROCEDURE — 31628 BRONCHOSCOPY/LUNG BX EACH: CPT | Performed by: INTERNAL MEDICINE

## 2019-10-22 PROCEDURE — 82962 GLUCOSE BLOOD TEST: CPT | Performed by: INTERNAL MEDICINE

## 2019-10-22 PROCEDURE — 31624 DX BRONCHOSCOPE/LAVAGE: CPT | Mod: 51,RT,, | Performed by: INTERNAL MEDICINE

## 2019-10-22 PROCEDURE — 25000003 PHARM REV CODE 250: Performed by: INTERNAL MEDICINE

## 2019-10-22 PROCEDURE — 88342 IMHCHEM/IMCYTCHM 1ST ANTB: CPT | Mod: 26,,, | Performed by: PATHOLOGY

## 2019-10-22 PROCEDURE — D9220A PRA ANESTHESIA: ICD-10-PCS | Mod: ANES,,, | Performed by: ANESTHESIOLOGY

## 2019-10-22 PROCEDURE — 88305 TISSUE EXAM BY PATHOLOGIST: CPT | Mod: 59 | Performed by: PATHOLOGY

## 2019-10-22 PROCEDURE — 87206 SMEAR FLUORESCENT/ACID STAI: CPT | Mod: 91

## 2019-10-22 RX ORDER — OXYMETAZOLINE HCL 0.05 %
SPRAY, NON-AEROSOL (ML) NASAL
Status: DISCONTINUED
Start: 2019-10-22 | End: 2019-10-22 | Stop reason: HOSPADM

## 2019-10-22 RX ORDER — GLYCOPYRROLATE 0.2 MG/ML
INJECTION INTRAMUSCULAR; INTRAVENOUS
Status: DISCONTINUED | OUTPATIENT
Start: 2019-10-22 | End: 2019-10-22

## 2019-10-22 RX ORDER — LIDOCAINE HYDROCHLORIDE 20 MG/ML
JELLY TOPICAL
Status: DISCONTINUED
Start: 2019-10-22 | End: 2019-10-22 | Stop reason: HOSPADM

## 2019-10-22 RX ORDER — GLYCOPYRROLATE 0.2 MG/ML
INJECTION INTRAMUSCULAR; INTRAVENOUS
Status: COMPLETED
Start: 2019-10-22 | End: 2019-10-22

## 2019-10-22 RX ORDER — LIDOCAINE HYDROCHLORIDE 10 MG/ML
INJECTION, SOLUTION EPIDURAL; INFILTRATION; INTRACAUDAL; PERINEURAL
Status: DISCONTINUED
Start: 2019-10-22 | End: 2019-10-22 | Stop reason: HOSPADM

## 2019-10-22 RX ORDER — PROPOFOL 10 MG/ML
VIAL (ML) INTRAVENOUS
Status: DISCONTINUED | OUTPATIENT
Start: 2019-10-22 | End: 2019-10-22

## 2019-10-22 RX ORDER — KETAMINE HYDROCHLORIDE 100 MG/ML
INJECTION, SOLUTION INTRAMUSCULAR; INTRAVENOUS
Status: COMPLETED
Start: 2019-10-22 | End: 2019-10-22

## 2019-10-22 RX ORDER — KETAMINE HYDROCHLORIDE 100 MG/ML
INJECTION, SOLUTION INTRAMUSCULAR; INTRAVENOUS
Status: DISCONTINUED | OUTPATIENT
Start: 2019-10-22 | End: 2019-10-22

## 2019-10-22 RX ORDER — LIDOCAINE HCL/PF 100 MG/5ML
SYRINGE (ML) INTRAVENOUS
Status: DISCONTINUED | OUTPATIENT
Start: 2019-10-22 | End: 2019-10-22

## 2019-10-22 RX ORDER — LIDOCAINE HYDROCHLORIDE 40 MG/ML
4 INJECTION, SOLUTION RETROBULBAR ONCE
Status: COMPLETED | OUTPATIENT
Start: 2019-10-22 | End: 2019-10-22

## 2019-10-22 RX ORDER — LIDOCAINE HYDROCHLORIDE 20 MG/ML
INJECTION, SOLUTION EPIDURAL; INFILTRATION; INTRACAUDAL; PERINEURAL
Status: DISCONTINUED
Start: 2019-10-22 | End: 2019-10-22 | Stop reason: HOSPADM

## 2019-10-22 RX ORDER — LIDOCAINE HYDROCHLORIDE 40 MG/ML
INJECTION, SOLUTION RETROBULBAR
Status: DISCONTINUED
Start: 2019-10-22 | End: 2019-10-22 | Stop reason: HOSPADM

## 2019-10-22 RX ORDER — SODIUM CHLORIDE 9 MG/ML
INJECTION, SOLUTION INTRAVENOUS CONTINUOUS
Status: DISCONTINUED | OUTPATIENT
Start: 2019-10-22 | End: 2019-10-22 | Stop reason: HOSPADM

## 2019-10-22 RX ADMIN — KETAMINE HYDROCHLORIDE 15 MG: 100 INJECTION, SOLUTION, CONCENTRATE INTRAMUSCULAR; INTRAVENOUS at 12:10

## 2019-10-22 RX ADMIN — PROPOFOL 50 MG: 10 INJECTION, EMULSION INTRAVENOUS at 12:10

## 2019-10-22 RX ADMIN — LIDOCAINE HYDROCHLORIDE 100 MG: 20 INJECTION, SOLUTION INTRAVENOUS at 12:10

## 2019-10-22 RX ADMIN — SODIUM CHLORIDE: 0.9 INJECTION, SOLUTION INTRAVENOUS at 11:10

## 2019-10-22 RX ADMIN — LIDOCAINE HYDROCHLORIDE 4 ML: 40 INJECTION, SOLUTION RETROBULBAR; TOPICAL at 12:10

## 2019-10-22 RX ADMIN — GLYCOPYRROLATE 0.1 MG: 0.2 INJECTION, SOLUTION INTRAMUSCULAR; INTRAVENOUS at 12:10

## 2019-10-22 NOTE — DISCHARGE INSTRUCTIONS
"Discharge Instructions: After Your Surgery/Procedure  Youve just had surgery. During surgery you were given medicine called anesthesia to keep you relaxed and free of pain. After surgery you may have some pain or nausea. This is common. Here are some tips for feeling better and getting well after surgery.     Stay on schedule with your medication.   Going home  Your doctor or nurse will show you how to take care of yourself when you go home. He or she will also answer your questions. Have an adult family member or friend drive you home.      For your safety we recommend these precaution for the first 24 hours after your procedure:  · Do not drive or use heavy equipment.  · Do not make important decisions or sign legal papers.  · Do not drink alcohol.  · Have someone stay with you, if needed. He or she can watch for problems and help keep you safe.  · Your concentration, balance, coordination, and judgement may be impaired for many hours after anesthesia.  Use caution when ambulating or standing up.     · You may feel weak and "washed out" after anesthesia and surgery.      Subtle residual effects of general anesthesia or sedation with regional / local anesthesia can last more than 24 hours.  Rest for the remainder of the day or longer if your Doctor/Surgeon has advised you to do so.  Although you may feel normal within the first 24 hours, your reflexes and mental ability may be impaired without you realizing it.  You may feel dizzy, lightheaded or sleepy for 24 hours or longer.      Be sure to go to all follow-up visits with your doctor. And rest after your surgery for as long as your doctor tells you to.  Coping with pain  If you have pain after surgery, pain medicine will help you feel better. Take it as told, before pain becomes severe. Also, ask your doctor or pharmacist about other ways to control pain. This might be with heat, ice, or relaxation. And follow any other instructions your surgeon or nurse gives " you.  Tips for taking pain medicine  To get the best relief possible, remember these points:  · Pain medicines can upset your stomach. Taking them with a little food may help.  · Most pain relievers taken by mouth need at least 20 to 30 minutes to start to work.  · Taking medicine on a schedule can help you remember to take it. Try to time your medicine so that you can take it before starting an activity. This might be before you get dressed, go for a walk, or sit down for dinner.  · Constipation is a common side effect of pain medicines. Call your doctor before taking any medicines such as laxatives or stool softeners to help ease constipation. Also ask if you should skip any foods. Drinking lots of fluids and eating foods such as fruits and vegetables that are high in fiber can also help. Remember, do not take laxatives unless your surgeon has prescribed them.  · Drinking alcohol and taking pain medicine can cause dizziness and slow your breathing. It can even be deadly. Do not drink alcohol while taking pain medicine.  · Pain medicine can make you react more slowly to things. Do not drive or run machinery while taking pain medicine.  Your health care provider may tell you to take acetaminophen to help ease your pain. Ask him or her how much you are supposed to take each day. Acetaminophen or other pain relievers may interact with your prescription medicines or other over-the-counter (OTC) drugs. Some prescription medicines have acetaminophen and other ingredients. Using both prescription and OTC acetaminophen for pain can cause you to overdose. Read the labels on your OTC medicines with care. This will help you to clearly know the list of ingredients, how much to take, and any warnings. It may also help you not take too much acetaminophen. If you have questions or do not understand the information, ask your pharmacist or health care provider to explain it to you before you take the OTC medicine.  Managing  nausea  Some people have an upset stomach after surgery. This is often because of anesthesia, pain, or pain medicine, or the stress of surgery. These tips will help you handle nausea and eat healthy foods as you get better. If you were on a special food plan before surgery, ask your doctor if you should follow it while you get better. These tips may help:  · Do not push yourself to eat. Your body will tell you when to eat and how much.  · Start off with clear liquids and soup. They are easier to digest.  · Next try semi-solid foods, such as mashed potatoes, applesauce, and gelatin, as you feel ready.  · Slowly move to solid foods. Dont eat fatty, rich, or spicy foods at first.  · Do not force yourself to have 3 large meals a day. Instead eat smaller amounts more often.  · Take pain medicines with a small amount of solid food, such as crackers or toast, to avoid nausea.     Call your surgeon if  · You still have pain an hour after taking medicine. The medicine may not be strong enough.  · You feel too sleepy, dizzy, or groggy. The medicine may be too strong.  · You have side effects like nausea, vomiting, or skin changes, such as rash, itching, or hives.       If you have obstructive sleep apnea  You were given anesthesia medicine during surgery to keep you comfortable and free of pain. After surgery, you may have more apnea spells because of this medicine and other medicines you were given. The spells may last longer than usual.   At home:  · Keep using the continuous positive airway pressure (CPAP) device when you sleep. Unless your health care provider tells you not to, use it when you sleep, day or night. CPAP is a common device used to treat obstructive sleep apnea.  · Talk with your provider before taking any pain medicine, muscle relaxants, or sedatives. Your provider will tell you about the possible dangers of taking these medicines.  © 4798-1290 [employer]. [address] [place] [address]  [place] [address]. All rights reserved. This information is not intended as a substitute for professional medical care. Always follow your healthcare professional's instructions.    Flexible Bronchoscopy  A flexible bronchoscopy is an exam of the airways of your lungs. A thin, flexible tube called a bronchoscope is used. It has a light and small camera that allow the healthcare provider to view your airways.    Before your test  · Follow your healthcare provider's instructions carefully. If you dont, the exam may be canceled. Or you may need to take it again.  · If you are taking blood-thinning medicine, ask your healthcare provider if you should stop taking the medicine before this test.  · Have no food or drink for at least 8 hours before the test. Also, avoid smoking for 24 hours before the test.  · You will need to remove any dentures or removable devices from your mouth.  · Right before the test, you will be given sedating medicines to help you relax. The medicine may be given by an IV (intravenously) into one of your veins. In addition, your nose and throat may be numbed with a special spray to help prevent gagging and coughing.  · If you are having this test as an outpatient, make sure you have an adult friend or family member to drive you home.  During your test  Bronchoscopy takes 45 to 60 minutes and includes the following steps:  · You may be given medicine (anesthesia) so that you are unconscious or asleep during the procedure.  · The healthcare provider inserts the tube into your nose or mouth.  · If you have not been given anesthesia, you might feel a gagging sensation. To help ease this feeling, you will be told to swallow or take deep breaths. Your airway will remain open even with the tube in place. But you wont be able to talk.  · The provider checks your breathing passage. He or she may also remove tiny tissue samples for biopsy.  After your test  · You may have a mild sore throat or cough. Your voice  may also be hoarse.  · Don't eat or drink until the anesthesia wears off.  · If you had a biopsy, you might see traces of blood being coughed up.  When to call your healthcare provider  Call your healthcare provider right away if you have any of the following:  · Shortness of breath  · Chest pain  · Bleeding from your nose or throat  · Coughing up a large amount of blood  · A fever above 100.4°F (38°C) for more than 24 hours  Call 911  Call 911 if you have:  · Chest pain  · Severe shortness of breath   Date Last Reviewed: 12/1/2016  © 4239-4607 [employer]. [address] [place] [address], [place] [address]. All rights reserved. This information is not intended as a substitute for professional medical care. Always follow your healthcare professional's instructions.

## 2019-10-22 NOTE — H&P
"10/22/2019    Skip Curran   up date h and p    No chief complaint on file.  cc is lung lesion        HPI:     10/22/19 no c/o.  Breathing good    10/17/19 - no c/o breathing good, no mucous.  Patient Instructions   Sputum culture and afb evaluations were all negative- no diagnosis of infection.    Pet scan with no cancer activity.    Consider biopsy scope - could do needle, would not recommend surgery    You had some vague shadow in 2009 ct abd, you had nearly same shadow on ct abdomen 12.21.14, maybe same or sl worse now- images hard to be certain with different technique.    Will do scope test, then recheck in 6-9 months on ct?  july 18, 2019  pt had belly pain left side, has had occasional gas pains stomach to back intermittently over yrs, not bad,no persistent, occurs once wk lasting 5 min with no ppt or relief factor, 6/10 at worse, last yr or so?  No asthma, sneezes ok  \    Had ct abd with abn rml.  Ct chest done.  Had am mucous yellow to green mucous over 15 yrs.  No sickness.    Currently has pain right lateral chest wall.  Patient Instructions   Check sputum checks,  Consider ct pet although some abn was seen in 2009 , now much different but not typical cancer appearance. Consider ct chest with contrast to see blood vessels later?  2 months after sputum cultured - if no diagnosis - follow up ct chest/pet scan.    The chief compliant  problem is new to me",   PFSH:  Past Medical History:   Diagnosis Date    Allergy     Arthritis     Asthma     Depression     Diabetes mellitus     GERD (gastroesophageal reflux disease)     Hypercholesteremia     Hypertension     Insomnia          Past Surgical History:   Procedure Laterality Date    anal fissure surgery      BREAST BIOPSY Right     benign    HYSTERECTOMY      KNEE ARTHROSCOPY W/ DEBRIDEMENT Left     x3    LIPOMA RESECTION      fatty tumor between breast    SHOULDER SURGERY Left 2/11/2016    Dr Burris     TONSILLECTOMY      WRIST " RECONSTRUCTION Right     tendon     Social History     Tobacco Use    Smoking status: Never Smoker    Smokeless tobacco: Never Used   Substance Use Topics    Alcohol use: Yes     Alcohol/week: 1.0 standard drinks     Types: 1 Shots of liquor per week     Comment: socially    Drug use: No     Family History   Problem Relation Age of Onset    Arthritis Mother     Hypertension Mother     Arthritis Father     Diabetes Father     Hypertension Father     Breast cancer Sister     Breast cancer Paternal Aunt      Review of patient's allergies indicates:   Allergen Reactions    Aspirin Hives    Ibuprofen Anaphylaxis    Aleve [naproxen sodium] Hives    Iodinated contrast media     Iodine Other (See Comments)     WHEN INJECTED- pt  states she coded    Pcn [penicillins] Rash       Performance Status:The patient's activity level is no limits with regular activity.      Review of Systems:  a review of eleven systems covering constitutional, Eye, HEENT, Psych, Respiratory, Cardiac, GI, , Musculoskeletal, Endocrine, Dermatologic was negative except for pertinent findings as listed ABOVE and below:  pertinent positive as above, rest is good  Diabetic 15 yrs.     Exam:Comprehensive exam done. BP (!) 170/79 (BP Location: Left arm, Patient Position: Lying)   Pulse (!) 58   Temp 97.7 °F (36.5 °C) (Skin)   Resp 16   Ht 5' (1.524 m)   Wt 84.4 kg (186 lb)   SpO2 96%   Breastfeeding? No   BMI 36.33 kg/m²   Exam included Vitals as listed, and patient's appearance and affect and alertness and mood, oral exam for yeast and hygiene and pharynx lesions and Mallapatti (M) score, neck with inspection for jvd and masses and thyroid abnormalities and lymph nodes (supraclavicular and infraclavicular nodes and axillary also examined and noted if abn), chest exam included symmetry and effort and fremitus and percussion and auscultation, cardiac exam included rhythm and gallops and murmur and rubs and jvd and edema, abdominal  "exam for mass and hepatosplenomegaly and tenderness and hernias and bowel sounds, Musculoskeletal exam with muscle tone and posture and mobility/gait and  strength, and skin for rashes and cyanosis and pallor and turgor, extremity for clubbing.  Findings were normal except for pertinent findings listed below:  M3, chest is symmetric, no distress, normal percussion, normal fremitus and good normal breath sounds  No clubbing.     Radiographs (ct chest and cxr) reviewed: view by direct vision   Compared ct abd 12/2014 with current ct and ct pet- vague differences.  Ct pet 10/1/19  Impression       1.  Wedge-shaped airspace opacity in the right middle lobe, unchanged from the previous CT with slight increased FDG activity from background.  Consider correlation with history, symptoms and possible endoscopy/biopsy as the differential includes low-grade malignancy, infection/pneumonia, atelectasis and scarring.    2.  No additional sites of FDG avid disease.    .      Electronically signed by: Scooter Tovar MD  Date: 10/01/2019       Ct chest done April 24, 2009 with vague density rml - not clearly nodular but scar like appearance with suggestion nodule.  June 21,2019 now crowding of airways and blood vessels rml with prominent vascular density- low resolution and no contrast.   1. Panlobular bullous emphysema.  2. "Ground-glass" changes in the lung possibly interstitial lung disease.  3. Medial segment right middle lobe atelectatic appearance with slight nodularity distally.  May be postinflammatory scarring however an active inflammatory neoplastic process should be excluded with further evaluation by pulmonologist.  This report was flagged in Epic as abnormal.      Electronically signed by: Ra Carrington II, MD  Date: 06/21/2019  Time: 11:31  Labs reviewed           PFT was not done       Plan:  Clinical impression is apparently straight forward and impression with management as below.     Skip was seen today for " follow-up.    Diagnoses and all orders for this visit:    Mass of middle lobe of right lung- seen as small shadow on ct chest in 2009.  -     Case request GI: bronch kayode , noon    Mild intermittent asthma with acute exacerbation        No follow-ups on file.     bronch bx rml lesion, cultures

## 2019-10-22 NOTE — TRANSFER OF CARE
Anesthesia Transfer of Care Note    Patient: Skip Curran    Procedure(s) Performed: Procedure(s) (LRB):  bronch floro , noon (N/A)    Patient location: PACU    Anesthesia Type: general    Transport from OR: Transported from OR on 100% O2 by closed face mask with adequate spontaneous ventilation    Post pain: adequate analgesia    Post assessment: no apparent anesthetic complications and tolerated procedure well    Post vital signs: stable    Level of consciousness: sedated    Nausea/Vomiting: no nausea/vomiting    Complications: none    Transfer of care protocol was followed      Last vitals:   Visit Vitals  BP (!) 101/58   Pulse 65   Temp 36.5 °C (97.7 °F) (Skin)   Resp 19   Ht 5' (1.524 m)   Wt 84.4 kg (186 lb)   SpO2 100%   Breastfeeding? No   BMI 36.33 kg/m²

## 2019-10-22 NOTE — DISCHARGE SUMMARY
10/22/2019      Pt presents with rml abnormality that has min pet activity for bronchoscopic sampling of rml     Updated h and p done.    10/22/2019- after informed consent, time out, sedation by anesthesia and review of ct chest - pt had left nares bronchoscopy done.  Tracheobronchial tree was wnl save for tracheobronchomalacia that seemed severe with exhalation under sedation.  Lavage rml with 90 cc in, and 35 cc out slightly cloudy fluid.  Wash and lavage submitted for afb, fungus, resp culture.  Cytology on lavage only.  tbbx x 6 from rml under floro done for path.  ebl <1 cc. No complications.      Dc when pt alert, takes po, walks 30 ft, cxr no pneumothorax.  F/u office 2 wks or per phone discussion.  Resume diet and activity and meds.    Dx mass rml, abn ct chest, tracheobronchomalacia.

## 2019-10-22 NOTE — PLAN OF CARE
Patient awake, alert and oriented.  No complaints of shortness of breath or chest pain;  Tolerating po fluids well.  Vital signs within normal limits;  02 sats stable on room air.  Dr. Andersen spoke with patient and family after procedure.   Dr. Andersen aware of post op cxr results.  Patient without complaints of any pain or discomfort.  Stable for discharge to home accompanied by family

## 2019-10-22 NOTE — ANESTHESIA POSTPROCEDURE EVALUATION
Anesthesia Post Evaluation    Patient: Skip Curran    Procedure(s) Performed: Procedure(s) (LRB):  bronch floro , noon (N/A)    Final Anesthesia Type: general  Patient location during evaluation: PACU  Patient participation: Yes- Able to Participate  Level of consciousness: awake and alert and oriented  Post-procedure vital signs: reviewed and stable  Pain management: adequate  Airway patency: patent  PONV status at discharge: No PONV  Anesthetic complications: no      Cardiovascular status: blood pressure returned to baseline  Respiratory status: unassisted, spontaneous ventilation and room air  Hydration status: euvolemic  Follow-up not needed.          Vitals Value Taken Time   /73 10/22/2019  1:34 PM   Temp 36.8 °C (98.3 °F) 10/22/2019  1:34 PM   Pulse 53 10/22/2019  1:34 PM   Resp 18 10/22/2019  1:34 PM   SpO2 96 % 10/22/2019  1:34 PM         Event Time     Out of Recovery 13:52:47          Pain/Ewa Score: Ewa Score: 10 (10/22/2019  1:34 PM)

## 2019-10-22 NOTE — PROCEDURES
10/22/2019- after informed consent, time out, sedation by anesthesia and review of ct chest - pt had left nares bronchoscopy done.  Tracheobronchial tree was wnl save for tracheobronchomalacia that seemed severe with exhalation under sedation.  Lavage rml with 90 cc in, and 35 cc out slightly cloudy fluid.  Wash and lavage submitted for afb, fungus, resp culture.  Cytology on lavage only.  tbbx x 6 from rml under floro done for path.  ebl <1 cc. No complications.

## 2019-10-22 NOTE — CARE UPDATE
10/22/19 1215   Patient Assessment/Suction   Level of Consciousness (AVPU) alert   PRE-TX-O2   O2 Device (Oxygen Therapy) room air   SpO2 96 %   Pulse (!) 58   Resp 16   Aerosol Therapy   $ Aerosol Therapy Charges Aerosol Treatment   Respiratory Treatment Status (SVN) given   Treatment Route (SVN) mask   Patient Position (SVN) HOB elevated   Post Treatment Assessment (SVN) breath sounds unchanged   Signs of Intolerance (SVN) none   Breath Sounds Post-Respiratory Treatment   Throughout All Fields Post-Treatment All Fields   Throughout All Fields Post-Treatment no change   Post-treatment Heart Rate (beats/min) 62   Post-treatment Resp Rate (breaths/min) 16

## 2019-10-22 NOTE — ANESTHESIA PREPROCEDURE EVALUATION
10/22/2019  Skip Curran is a 69 y.o., female.    Anesthesia Evaluation    I have reviewed the Patient Summary Reports.    I have reviewed the Nursing Notes.   I have reviewed the Medications.     Review of Systems  Anesthesia Hx:  No problems with previous Anesthesia    Social:  Non-Smoker    Cardiovascular:   Hypertension    Pulmonary:   Asthma Bronchitis, chronic, mucopurulent   Hepatic/GI:   GERD    Musculoskeletal:   Arthritis     Neurological:   Neuromuscular Disease,    Endocrine:   Diabetes, poorly controlled, type 2    Psych:   Psychiatric History          Physical Exam  General:  Obesity    Airway/Jaw/Neck:  Airway Findings: Mouth Opening: Normal Tongue: Normal  General Airway Assessment: Adult, Good  Mallampati: II  Improves to II with phonation.  TM Distance: 4-6 cm      Dental:  Dental Findings: In tact   Chest/Lungs:  Chest/Lungs Findings: Clear to auscultation, Normal Respiratory Rate     Heart/Vascular:  Heart Findings: Rate: Normal  Rhythm: Regular Rhythm  Sounds: Normal  Heart murmur: negative       Mental Status:  Mental Status Findings:  Cooperative, Alert and Oriented         Anesthesia Plan  Type of Anesthesia, risks & benefits discussed:  Anesthesia Type:  general  Patient's Preference:   Intra-op Monitoring Plan: standard ASA monitors  Intra-op Monitoring Plan Comments:   Post Op Pain Control Plan:   Post Op Pain Control Plan Comments:   Induction:   IV  Beta Blocker:  Patient is not currently on a Beta-Blocker (No further documentation required).       Informed Consent: Patient understands risks and agrees with Anesthesia plan.  Questions answered. Anesthesia consent signed with patient.  ASA Score: 3     Day of Surgery Review of History & Physical: I have interviewed and examined the patient. I have reviewed the patient's H&P dated:  There are no significant changes.  H&P update  referred to the surgeon.         Ready For Surgery From Anesthesia Perspective.

## 2019-10-25 LAB
BACTERIA SPEC AEROBE CULT: NORMAL
GRAM STN SPEC: NORMAL

## 2019-10-31 DIAGNOSIS — R91.1 LUNG NODULE: Primary | ICD-10-CM

## 2019-10-31 DIAGNOSIS — J98.4 ABNORMALITY OF LUNG: ICD-10-CM

## 2019-11-21 LAB
FUNGUS SPEC CULT: NORMAL
FUNGUS SPEC CULT: NORMAL

## 2019-12-12 ENCOUNTER — TELEPHONE (OUTPATIENT)
Dept: PULMONOLOGY | Facility: CLINIC | Age: 69
End: 2019-12-12

## 2019-12-12 NOTE — TELEPHONE ENCOUNTER
Patient's bronch wash AFB +.      ----- Message from Nuria Juarez sent at 12/12/2019  2:16 PM CST -----  Contact: [employer] bio jeffyt Malissa Traceya  afb culture collect in Oct - calling to give results    [phone]

## 2019-12-19 ENCOUNTER — TELEPHONE (OUTPATIENT)
Dept: PULMONOLOGY | Facility: CLINIC | Age: 69
End: 2019-12-19

## 2019-12-19 NOTE — TELEPHONE ENCOUNTER
Left a ,essage for the patient to call back for an appt in 2-4 weeks    ----- Message from Pepe Andersen MD sent at 12/17/2019  7:57 AM CST -----  Need to bring into office, germ grew out and may be good to treat.  Office within next 2-4 wks for consideration  Of treatment.

## 2019-12-19 NOTE — TELEPHONE ENCOUNTER
Gave patient an appt for 12/30/2019 at 11:00 am.Per Dr. Andersen      ----- Message from Linda Franco sent at 12/19/2019 10:53 AM CST -----  Contact: self  Type:  Patient Returning Call    Who Called:  Self  Who Left Message for Patient:  Steffany  Does the patient know what this is regarding?:  yes  Best Call Back Number:  [phone] (home)   Additional Information:  Patient states nurse needs to make an appointment due to results. Please call patient. Thanks!

## 2019-12-25 LAB
ACID FAST MOD KINY STN SPEC: NORMAL
MYCOBACTERIUM SPEC QL CULT: NORMAL

## 2019-12-30 ENCOUNTER — OFFICE VISIT (OUTPATIENT)
Dept: PULMONOLOGY | Facility: CLINIC | Age: 69
End: 2019-12-30
Payer: COMMERCIAL

## 2019-12-30 VITALS
OXYGEN SATURATION: 97 % | HEIGHT: 60 IN | BODY MASS INDEX: 35.37 KG/M2 | DIASTOLIC BLOOD PRESSURE: 74 MMHG | HEART RATE: 61 BPM | WEIGHT: 180.13 LBS | SYSTOLIC BLOOD PRESSURE: 133 MMHG

## 2019-12-30 DIAGNOSIS — A31.0 MAI (MYCOBACTERIUM AVIUM-INTRACELLULARE) INFECTION: Primary | ICD-10-CM

## 2019-12-30 DIAGNOSIS — R91.8 MASS OF RIGHT LUNG: ICD-10-CM

## 2019-12-30 PROCEDURE — 99999 PR PBB SHADOW E&M-EST. PATIENT-LVL IV: CPT | Mod: PBBFAC,,, | Performed by: INTERNAL MEDICINE

## 2019-12-30 PROCEDURE — 99214 OFFICE O/P EST MOD 30 MIN: CPT | Mod: PBBFAC,PO | Performed by: INTERNAL MEDICINE

## 2019-12-30 PROCEDURE — 99214 OFFICE O/P EST MOD 30 MIN: CPT | Mod: S$GLB,,, | Performed by: INTERNAL MEDICINE

## 2019-12-30 PROCEDURE — 99214 PR OFFICE/OUTPT VISIT, EST, LEVL IV, 30-39 MIN: ICD-10-PCS | Mod: S$GLB,,, | Performed by: INTERNAL MEDICINE

## 2019-12-30 PROCEDURE — 99999 PR PBB SHADOW E&M-EST. PATIENT-LVL IV: ICD-10-PCS | Mod: PBBFAC,,, | Performed by: INTERNAL MEDICINE

## 2019-12-30 RX ORDER — AZITHROMYCIN 500 MG/1
500 TABLET, FILM COATED ORAL
Qty: 12 TABLET | Refills: 5 | Status: SHIPPED | OUTPATIENT
Start: 2019-12-30 | End: 2020-02-10

## 2019-12-30 RX ORDER — RIFAMPIN 300 MG/1
600 CAPSULE ORAL
Qty: 24 CAPSULE | Refills: 5 | Status: SHIPPED | OUTPATIENT
Start: 2019-12-30 | End: 2020-02-10

## 2019-12-30 RX ORDER — ETHAMBUTOL HYDROCHLORIDE 400 MG/1
2000 TABLET, FILM COATED ORAL
Qty: 60 TABLET | Refills: 5 | Status: SHIPPED | OUTPATIENT
Start: 2019-12-30 | End: 2020-02-10

## 2019-12-30 NOTE — PROGRESS NOTES
"12/30/2019    Skip Curran  New Patient Consult    Chief Complaint   Patient presents with    Results       HPI:    Dec 30, 2019- occ am cough with occ yg mucous.  No new c/o.        Oct 17,2019-no new c/o , breathing good, no mucous    Patient Instructions   Sputum culture and afb evaluations were all negative- no diagnosis of infection.    Pet scan with no cancer activity.    Consider biopsy scope - could do needle, would not recommend surgery    You had some vague shadow in 2009 ct abd, you had nearly same shadow on ct abdomen 12.21.14, maybe same or sl worse now- images hard to be certain with different technique.    Will do scope test, then recheck in 6-9 months on ct?  july 18, 2019  pt had belly pain left side, has had occasional gas pains stomach to back intermittently over yrs, not bad,no persistent, occurs once wk lasting 5 min with no ppt or relief factor, 6/10 at worse, last yr or so?  No asthma, sneezes ok  \    Had ct abd with abn rml.  Ct chest done.  Had am mucous yellow to green mucous over 15 yrs.  No sickness.    Currently has pain right lateral chest wall.  Patient Instructions   Check sputum checks,  Consider ct pet although some abn was seen in 2009 , now much different but not typical cancer appearance. Consider ct chest with contrast to see blood vessels later?  2 months after sputum cultured - if no diagnosis - follow up ct chest/pet scan.    The chief compliant  problem is new to me",   PFSH:  Past Medical History:   Diagnosis Date    Allergy     Arthritis     Asthma     Depression     Diabetes mellitus     GERD (gastroesophageal reflux disease)     Hypercholesteremia     Hypertension     Insomnia          Past Surgical History:   Procedure Laterality Date    anal fissure surgery      BREAST BIOPSY Right     benign    BRONCHOSCOPY N/A 10/22/2019    Procedure: bronch floro , noon;  Surgeon: Pepe Andersen MD;  [facility]: [facility];  Service: Endoscopy;  Laterality: N/A;    " HYSTERECTOMY      KNEE ARTHROSCOPY W/ DEBRIDEMENT Left     x3    LIPOMA RESECTION      fatty tumor between breast    SHOULDER SURGERY Left 2/11/2016    Dr Burris     TONSILLECTOMY      WRIST RECONSTRUCTION Right     tendon     Social History     Tobacco Use    Smoking status: Never Smoker    Smokeless tobacco: Never Used   Substance Use Topics    Alcohol use: Yes     Alcohol/week: 1.0 standard drinks     Types: 1 Shots of liquor per week     Comment: socially    Drug use: No     Family History   Problem Relation Age of Onset    Arthritis Mother     Hypertension Mother     Arthritis Father     Diabetes Father     Hypertension Father     Breast cancer Sister     Breast cancer Paternal Aunt      Review of patient's allergies indicates:   Allergen Reactions    Aspirin Hives    Ibuprofen Anaphylaxis    Aleve [naproxen sodium] Hives    Iodinated contrast media     Iodine Other (See Comments)     WHEN INJECTED- pt  states she coded    Pcn [penicillins] Rash       Performance Status:The patient's activity level is no limits with regular activity.      Review of Systems:  a review of eleven systems covering constitutional, Eye, HEENT, Psych, Respiratory, Cardiac, GI, , Musculoskeletal, Endocrine, Dermatologic was negative except for pertinent findings as listed ABOVE and below:  pertinent positive as above, rest is good  Diabetic 15 yrs.     Exam:Comprehensive exam done. /74 (BP Location: Left arm, Patient Position: Sitting)   Pulse 61   Ht 5' (1.524 m)   Wt 81.7 kg (180 lb 1.9 oz)   SpO2 97% Comment: on room air at rest  BMI 35.18 kg/m²   Exam included Vitals as listed, and patient's appearance and affect and alertness and mood, oral exam for yeast and hygiene and pharynx lesions and Mallapatti (M) score, neck with inspection for jvd and masses and thyroid abnormalities and lymph nodes (supraclavicular and infraclavicular nodes and axillary also examined and noted if abn), chest exam  "included symmetry and effort and fremitus and percussion and auscultation, cardiac exam included rhythm and gallops and murmur and rubs and jvd and edema, abdominal exam for mass and hepatosplenomegaly and tenderness and hernias and bowel sounds, Musculoskeletal exam with muscle tone and posture and mobility/gait and  strength, and skin for rashes and cyanosis and pallor and turgor, extremity for clubbing.  Findings were normal except for pertinent findings listed below:  M3, chest is symmetric, no distress, normal percussion, normal fremitus and good normal breath sounds  No clubbing.     Radiographs (ct chest and cxr) reviewed: view by direct vision   Compared ct abd 12/2014 with current ct and ct pet- vague differences.  Ct pet 10/1/19  Impression       1.  Wedge-shaped airspace opacity in the right middle lobe, unchanged from the previous CT with slight increased FDG activity from background.  Consider correlation with history, symptoms and possible endoscopy/biopsy as the differential includes low-grade malignancy, infection/pneumonia, atelectasis and scarring.    2.  No additional sites of FDG avid disease.    .      Electronically signed by: Scooter Tovar MD  Date: 10/01/2019       Ct chest done April 24, 2009 with vague density rml - not clearly nodular but scar like appearance with suggestion nodule.  June 21,2019 now crowding of airways and blood vessels rml with prominent vascular density- low resolution and no contrast.   1. Panlobular bullous emphysema.  2. "Ground-glass" changes in the lung possibly interstitial lung disease.  3. Medial segment right middle lobe atelectatic appearance with slight nodularity distally.  May be postinflammatory scarring however an active inflammatory neoplastic process should be excluded with further evaluation by pulmonologist.  This report was flagged in Epic as abnormal.      Electronically signed by: Ra Carrington II, MD  Date: 06/21/2019  Time: 11:31  Labs " reviewed           PFT was not done       Plan:  Clinical impression is apparently straight forward and impression with management as below.     Skip was seen today for results.    Diagnoses and all orders for this visit:    MAGDIEL (mycobacterium avium-intracellulare) infection  -     azithromycin (ZITHROMAX) 500 MG tablet; Take 1 tablet (500 mg total) by mouth every Mon, Wed, Fri.  -     rifAMpin (RIFADIN) 300 MG capsule; Take 2 capsules (600 mg total) by mouth every Mon, Wed, Fri.  -     ethambutol (MYAMBUTOL) 400 MG Tab; Take 5 tablets (2,000 mg total) by mouth every Mon, Wed, Fri.  -     AFB Culture & Smear; Standing  -     Comprehensive metabolic panel; Future    Mass of right lung  -     azithromycin (ZITHROMAX) 500 MG tablet; Take 1 tablet (500 mg total) by mouth every Mon, Wed, Fri.  -     rifAMpin (RIFADIN) 300 MG capsule; Take 2 capsules (600 mg total) by mouth every Mon, Wed, Fri.  -     ethambutol (MYAMBUTOL) 400 MG Tab; Take 5 tablets (2,000 mg total) by mouth every Mon, Wed, Fri.        Follow up in about 3 months (around 3/30/2020), or if symptoms worsen or fail to improve.    Discussed with patient above for education the following:      Patient Instructions   MYCOBACTERIUM AVIUM INTRACELLULARE COMPLEX--- MAC-Grew from scope and is likely cause of spot in lung?    Verify in sputum with another sputum sample- ordered up to 3.    Need to treat MAC with 3 antibiotics - Monday, Wednesday, Friday.    Start with azithromycin for a wk, then add rifampin for a week, than - need to be cleared by eye doctor - start ethambutol.  Need to monitor eyes to start and continue ethambutol.    Could check liver test if any appetite changes/nausea on rifampin.    Course therapy would be 1.5 yrs, with 70% cure rate.    Need to avoid stopping medication    Plan to do ct in 6 or so months.     Call if issues with MAC diagnosis or treatment

## 2019-12-30 NOTE — PATIENT INSTRUCTIONS
MYCOBACTERIUM AVIUM INTRACELLULARE COMPLEX--- MAC-Grew from scope and is likely cause of spot in lung?    Verify in sputum with another sputum sample- ordered up to 3.    Need to treat MAC with 3 antibiotics - Monday, Wednesday, Friday.    Start with azithromycin for a wk, then add rifampin for a week, than - need to be cleared by eye doctor - start ethambutol.  Need to monitor eyes to start and continue ethambutol.    Could check liver test if any appetite changes/nausea on rifampin.    Course therapy would be 1.5 yrs, with 70% cure rate.    Need to avoid stopping medication    Plan to do ct in 6 or so months.     Call if issues with MAC diagnosis or treatment

## 2020-01-14 ENCOUNTER — OFFICE VISIT (OUTPATIENT)
Dept: PULMONOLOGY | Facility: CLINIC | Age: 70
End: 2020-01-14
Payer: MEDICARE

## 2020-01-14 VITALS
WEIGHT: 178.13 LBS | SYSTOLIC BLOOD PRESSURE: 122 MMHG | HEART RATE: 70 BPM | DIASTOLIC BLOOD PRESSURE: 76 MMHG | OXYGEN SATURATION: 96 % | BODY MASS INDEX: 34.97 KG/M2 | HEIGHT: 60 IN

## 2020-01-14 DIAGNOSIS — J44.1 ASTHMA EXACERBATION IN COPD: Primary | ICD-10-CM

## 2020-01-14 DIAGNOSIS — A31.0 MAI (MYCOBACTERIUM AVIUM-INTRACELLULARE) INFECTION: Primary | ICD-10-CM

## 2020-01-14 DIAGNOSIS — R91.8 ABNORMAL FINDINGS ON DIAGNOSTIC IMAGING OF LUNG: ICD-10-CM

## 2020-01-14 DIAGNOSIS — A31.0 PULMONARY MYCOBACTERIUM AVIUM COMPLEX (MAC) INFECTION: ICD-10-CM

## 2020-01-14 DIAGNOSIS — J45.901 ASTHMA EXACERBATION IN COPD: Primary | ICD-10-CM

## 2020-01-14 PROCEDURE — 99999 PR PBB SHADOW E&M-EST. PATIENT-LVL III: CPT | Mod: PBBFAC,,, | Performed by: INTERNAL MEDICINE

## 2020-01-14 PROCEDURE — 99213 OFFICE O/P EST LOW 20 MIN: CPT | Mod: PBBFAC | Performed by: INTERNAL MEDICINE

## 2020-01-14 PROCEDURE — 99999 PR PBB SHADOW E&M-EST. PATIENT-LVL III: ICD-10-PCS | Mod: PBBFAC,,, | Performed by: INTERNAL MEDICINE

## 2020-01-14 PROCEDURE — 99214 OFFICE O/P EST MOD 30 MIN: CPT | Mod: S$PBB,,, | Performed by: INTERNAL MEDICINE

## 2020-01-14 PROCEDURE — 99214 PR OFFICE/OUTPT VISIT, EST, LEVL IV, 30-39 MIN: ICD-10-PCS | Mod: S$PBB,,, | Performed by: INTERNAL MEDICINE

## 2020-01-14 NOTE — PROGRESS NOTES
Subjective:      Patient ID: Skip Curran is a 69 y.o. female.    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 She had an abnormal CT of the chest done in June. Originally she  had abdominal pain and an abdominal CT noted a density in the right lower lung field. This incidental finding  led to a chest CT that showed a density in the R middle lobe.  She was then seen in consultation i in the [facility]. By Dr. Dionte Andersen in July,  IN October she had a bronchoscope with a biopsy and brush of he Right Middle Lobe/ Path is nebulous, some bland spindle cells and bronchitis. Could not R/O carcoid. She ultimately grew out some MAC  Recently Dr. Andersen caller her in and told that he didn't know what she had but was going to start treatment with three drugs for MAC and monitor the mass in the right middle lobe.    She denies cough, hemoptysis and is a non smoker.I told her that I would do a CBC and ESR to see if MAC represents an active infection and repeat the CT since is  Has been six months. ESR:7!  Suggests MAC in a contaminant. Will await  New CT before deciding what next.  Review of Systems   Constitutional: Negative.    HENT: Negative.    Eyes: Negative.    Respiratory: Positive for cough.    Cardiovascular: Negative.    Genitourinary: Negative.    Endocrine: Type II diabetes   Musculoskeletal: Negative.         Hx of rotator cuff tear   Skin: Negative.    Gastrointestinal: Negative.         Hx of GERDs and anal fissure.   Neurological: Negative.    Psychiatric/Behavioral: Negative.      Objective:     Physical Exam   Constitutional: She is oriented to person, place, and time. She appears well-developed and well-nourished. No distress.   HENT:   Head: Normocephalic and atraumatic.   Right Ear: External ear normal.   Left Ear: External ear normal.   Nose: Nose normal.   Mouth/Throat: Oropharynx is clear and moist.   Eyes:  Pupils are equal, round, and reactive to light. Conjunctivae and EOM are normal.   Neck: Normal range of motion. Neck supple. No JVD present. No thyromegaly present.   Cardiovascular: Normal rate, regular rhythm, normal heart sounds and intact distal pulses. Exam reveals no gallop.   No murmur heard.  Pulmonary/Chest: Breath sounds normal. No stridor. No respiratory distress. She has no wheezes. She has no rales. She exhibits no tenderness.   Clear with Peak flow 400 min    Sa02: 95% and 93% after walking the length of the michelle and back.   Abdominal: Soft. Bowel sounds are normal. She exhibits no distension and no mass. There is no tenderness. There is no rebound and no guarding.   Musculoskeletal: Normal range of motion. She exhibits no edema.   Lymphadenopathy:     She has no cervical adenopathy.   Neurological: She is alert and oriented to person, place, and time. She has normal reflexes. She displays normal reflexes. No cranial nerve deficit.   Skin: Skin is warm and dry. No rash noted.   Psychiatric: She has a normal mood and affect. Her behavior is normal. Judgment and thought content normal.   Nursing note and vitals reviewed.      Assessment:     1. Asthma exacerbation in COPD    2. Pulmonary Mycobacterium avium complex (MAC) infection      Outpatient Encounter Medications as of 1/14/2020   Medication Sig Dispense Refill    albuterol (ACCUNEB) 0.63 mg/3 mL Nebu Take 3 mLs (0.63 mg total) by nebulization every 6 (six) hours as needed. Rescue 1 Box 1    azithromycin (ZITHROMAX) 500 MG tablet Take 1 tablet (500 mg total) by mouth every Mon, Wed, Fri. 12 tablet 5    calcium carbonate-vitamin D3 (CALCIUM 600 + D,3,) 600 mg calcium- 200 unit Cap Take 600 mg by mouth every evening. 90 capsule 0    citalopram (CELEXA) 10 MG tablet Take 1 tablet (10 mg total) by mouth once daily. 90 tablet 1    ethambutol (MYAMBUTOL) 400 MG Tab Take 5 tablets (2,000 mg total) by mouth every Mon, Wed, Fri. 60 tablet 5     "fenofibrate (TRICOR) 145 MG tablet Take 1 tablet (145 mg total) by mouth once daily. 90 tablet 1    fish oil-omega-3 fatty acids 300-1,000 mg capsule Take 1 capsule (1 g total) by mouth once daily. 90 capsule 0    fluticasone-salmeterol diskus inhaler 250-50 mcg Inhale 1 puff into the lungs 2 (two) times daily. Controller 60 each 0    FREESTYLE LITE STRIPS Strp USE 1 STRIP TO CHECK GLUCOSE THREE TIMES DAILY 100 each 0    glucosamine-chondroitin 500-400 mg tablet Take 1 tablet by mouth once daily. 90 tablet 0    insulin (LANTUS SOLOSTAR U-100 INSULIN) glargine 100 units/mL (3mL) SubQ pen Inject 55 Units into the skin every evening. 15 mL 4    insulin aspart protamine-insulin aspart (NOVOLOG 70/30) 100 unit/mL (70-30) InPn pen Inject 10 Units into the skin 2 (two) times daily before meals. 9 mL 3    ipratropium (ATROVENT HFA) 17 mcg/actuation inhaler Inhale 2 puffs into the lungs every 6 (six) hours. Rescue 12.9 g 1    lisinopril (ZESTRIL) 10 MG tablet Take 1 tablet (10 mg total) by mouth 2 (two) times daily. For blood presurre 180 tablet 0    pantoprazole (PROTONIX) 40 MG tablet Take 1 tablet (40 mg total) by mouth once daily. 90 tablet 0    pen needle, diabetic (ULTRA-THIN II INS PEN NEEDLES) 29 gauge x 1/2" Ndle 1 Stick by Misc.(Non-Drug; Combo Route) route 3 (three) times daily. 90 each 6    rifAMpin (RIFADIN) 300 MG capsule Take 2 capsules (600 mg total) by mouth every Mon, Wed, Fri. 24 capsule 5    SITagliptan-metformin (JANUMET)  mg per tablet Take 1 tablet by mouth once daily. 90 tablet 0    traMADol (ULTRAM) 50 mg tablet Take 1 tablet (50 mg total) by mouth every 4 (four) hours as needed for Pain. 20 tablet 0    traZODone (DESYREL) 100 MG tablet Take 1 tablet (100 mg total) by mouth every evening. 90 tablet 1     No facility-administered encounter medications on file as of 1/14/2020.      Orders Placed This Encounter   Procedures    Spirometry without Bronchodilator     Standing Status:   " Future     Standing Expiration Date:   1/13/2021     Plan:   ESR is 7  & doubt that she warrants therapy for the infiltrate in the  RML will repeat the CT and see the status of the process in the RML.. May need a repeat bronchoscope with the directional bronchoscope  Started as an incidental finding on an abdominal CT  Problem List Items Addressed This Visit     None      Visit Diagnoses     Asthma exacerbation in COPD    -  Primary    Relevant Orders    Spirometry without Bronchodilator    Pulmonary Mycobacterium avium complex (MAC) infection

## 2020-01-23 ENCOUNTER — OFFICE VISIT (OUTPATIENT)
Dept: PULMONOLOGY | Facility: CLINIC | Age: 70
End: 2020-01-23
Payer: MEDICARE

## 2020-01-23 ENCOUNTER — HOSPITAL ENCOUNTER (OUTPATIENT)
Dept: RADIOLOGY | Facility: HOSPITAL | Age: 70
Discharge: HOME | End: 2020-01-23
Attending: INTERNAL MEDICINE
Payer: MEDICARE

## 2020-01-23 ENCOUNTER — HOSPITAL ENCOUNTER (OUTPATIENT)
Dept: PULMONOLOGY | Facility: CLINIC | Age: 70
Discharge: HOME | End: 2020-01-23
Payer: MEDICARE

## 2020-01-23 VITALS
HEART RATE: 97 BPM | OXYGEN SATURATION: 67 % | BODY MASS INDEX: 35.93 KG/M2 | DIASTOLIC BLOOD PRESSURE: 68 MMHG | HEIGHT: 60 IN | WEIGHT: 183 LBS | SYSTOLIC BLOOD PRESSURE: 126 MMHG

## 2020-01-23 DIAGNOSIS — J44.1 ASTHMA EXACERBATION IN COPD: Primary | ICD-10-CM

## 2020-01-23 DIAGNOSIS — R91.8 ABNORMAL CT SCAN, LUNG: ICD-10-CM

## 2020-01-23 DIAGNOSIS — J45.901 ASTHMA EXACERBATION IN COPD: ICD-10-CM

## 2020-01-23 DIAGNOSIS — J44.1 ASTHMA EXACERBATION IN COPD: ICD-10-CM

## 2020-01-23 DIAGNOSIS — R91.8 ABNORMAL FINDINGS ON DIAGNOSTIC IMAGING OF LUNG: ICD-10-CM

## 2020-01-23 DIAGNOSIS — A31.0 MAI (MYCOBACTERIUM AVIUM-INTRACELLULARE) INFECTION: ICD-10-CM

## 2020-01-23 DIAGNOSIS — J45.901 ASTHMA EXACERBATION IN COPD: Primary | ICD-10-CM

## 2020-01-23 LAB
FEF 25 75 LLN: 0.83
FEF 25 75 PRE REF: 158.4 %
FEF 25 75 REF: 1.76
FEV05 LLN: 0.67
FEV05 REF: 1.53
FEV1 FVC LLN: 66
FEV1 FVC PRE REF: 106.3 %
FEV1 FVC REF: 79
FEV1 LLN: 1.43
FEV1 PRE REF: 112.9 %
FEV1 REF: 1.96
FVC LLN: 1.83
FVC PRE REF: 105.7 %
FVC REF: 2.49
PEF LLN: 3.68
PEF PRE REF: 80.3 %
PEF REF: 5.17
PHYSICIAN COMMENT: ABNORMAL
PRE FEF 25 75: 2.78 L/S (ref 0.83–2.69)
PRE FET 100: 7.01 SEC
PRE FEV05 REF: 114.5 %
PRE FEV1 FVC: 83.9 % (ref 65.74–92.05)
PRE FEV1: 2.21 L (ref 1.43–2.48)
PRE FEV5: 1.75 L (ref 0.67–2.39)
PRE FVC: 2.63 L (ref 1.83–3.15)
PRE PEF: 4.15 L/S (ref 3.68–6.67)

## 2020-01-23 PROCEDURE — 99999 PR PBB SHADOW E&M-EST. PATIENT-LVL III: CPT | Mod: PBBFAC,,, | Performed by: INTERNAL MEDICINE

## 2020-01-23 PROCEDURE — 94010 BREATHING CAPACITY TEST: CPT | Mod: 26,S$PBB,, | Performed by: INTERNAL MEDICINE

## 2020-01-23 PROCEDURE — 99214 PR OFFICE/OUTPT VISIT, EST, LEVL IV, 30-39 MIN: ICD-10-PCS | Mod: S$PBB,,, | Performed by: INTERNAL MEDICINE

## 2020-01-23 PROCEDURE — 94010 BREATHING CAPACITY TEST: CPT | Mod: PBBFAC | Performed by: INTERNAL MEDICINE

## 2020-01-23 PROCEDURE — 71250 CT THORAX DX C-: CPT | Mod: 26,,, | Performed by: RADIOLOGY

## 2020-01-23 PROCEDURE — 99213 OFFICE O/P EST LOW 20 MIN: CPT | Mod: PBBFAC,25 | Performed by: INTERNAL MEDICINE

## 2020-01-23 PROCEDURE — 71250 CT CHEST WITHOUT CONTRAST: ICD-10-PCS | Mod: 26,,, | Performed by: RADIOLOGY

## 2020-01-23 PROCEDURE — 94010 BREATHING CAPACITY TEST: ICD-10-PCS | Mod: 26,S$PBB,, | Performed by: INTERNAL MEDICINE

## 2020-01-23 PROCEDURE — 71250 CT THORAX DX C-: CPT | Mod: TC

## 2020-01-23 PROCEDURE — 99999 PR PBB SHADOW E&M-EST. PATIENT-LVL III: ICD-10-PCS | Mod: PBBFAC,,, | Performed by: INTERNAL MEDICINE

## 2020-01-23 PROCEDURE — 99214 OFFICE O/P EST MOD 30 MIN: CPT | Mod: S$PBB,,, | Performed by: INTERNAL MEDICINE

## 2020-01-26 NOTE — PROGRESS NOTES
Subjective:      Patient ID: Skip Curran is a 69 y.o. female.    Chief Complaint: Abnormal Chest X-ray and Cough    HPI  Patient returns with a chest  CT The process is in the lateral segment of the right middle lobe and unchanged from the baseline study in June. 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 She has  a volume loss (mass 4.8 X 3.2.) and peribronchial thickiening of several distal bronchi   This in not the appearance of an active MAC infection. Will review with Dr Hutson and see if wecan repeat the bronchoscope and be more aggressive in getting biopsies of the abnormal segments.   The patient initially had an abdominal CT to  R/O gall bladder disease in June, 2019 and incidentially  the process in the RML was found  No pulmonary complaints then or NOW  Her PFT's done today are normal and she is NOT Sick.      No flowsheet data found.  Review of Systems   Constitutional: Negative.    HENT: Negative.    Eyes: Negative.    Respiratory: Negative.         Mass RML     Cardiovascular: Negative.    Genitourinary: Negative.    Musculoskeletal: Negative.    Skin: Negative.    Gastrointestinal: Negative.    Neurological: Negative.    Psychiatric/Behavioral: Negative.      Objective:     Physical Exam   Constitutional: She is oriented to person, place, and time. She appears well-developed and well-nourished. No distress.   HENT:   Head: Normocephalic and atraumatic.   Right Ear: External ear normal.   Left Ear: External ear normal.   Nose: Nose normal.   Mouth/Throat: Oropharynx is clear and moist.   Eyes: Pupils are equal, round, and reactive to light. Conjunctivae and EOM are normal.   Neck: Normal range of motion. Neck supple. No JVD present. No thyromegaly present.   Cardiovascular: Normal rate, regular rhythm, normal heart sounds and intact distal pulses. Exam reveals no gallop.   No murmur heard.  Pulmonary/Chest: Breath sounds normal. No stridor. No respiratory distress.  She has no wheezes. She has no rales. She exhibits no tenderness.   Normal PFT's today  Clear without focal wheezes or rales.    Abdominal: Soft. Bowel sounds are normal. She exhibits no distension and no mass. There is no tenderness. There is no rebound and no guarding.   Musculoskeletal: Normal range of motion. She exhibits no edema.   Lymphadenopathy:     She has no cervical adenopathy.   Neurological: She is alert and oriented to person, place, and time. She has normal reflexes. She displays normal reflexes. No cranial nerve deficit.   Skin: Skin is warm and dry. No rash noted.   Psychiatric: She has a normal mood and affect. Her behavior is normal. Judgment and thought content normal.   Nursing note and vitals reviewed.      Assessment:     1. Asthma exacerbation in COPD    2. Abnormal CT scan, lung      Outpatient Encounter Medications as of 1/23/2020   Medication Sig Dispense Refill    albuterol (ACCUNEB) 0.63 mg/3 mL Nebu Take 3 mLs (0.63 mg total) by nebulization every 6 (six) hours as needed. Rescue 1 Box 1    azithromycin (ZITHROMAX) 500 MG tablet Take 1 tablet (500 mg total) by mouth every Mon, Wed, Fri. 12 tablet 5    calcium carbonate-vitamin D3 (CALCIUM 600 + D,3,) 600 mg calcium- 200 unit Cap Take 600 mg by mouth every evening. 90 capsule 0    citalopram (CELEXA) 10 MG tablet Take 1 tablet (10 mg total) by mouth once daily. 90 tablet 1    ethambutol (MYAMBUTOL) 400 MG Tab Take 5 tablets (2,000 mg total) by mouth every Mon, Wed, Fri. 60 tablet 5    fenofibrate (TRICOR) 145 MG tablet Take 1 tablet (145 mg total) by mouth once daily. 90 tablet 1    fish oil-omega-3 fatty acids 300-1,000 mg capsule Take 1 capsule (1 g total) by mouth once daily. 90 capsule 0    fluticasone-salmeterol diskus inhaler 250-50 mcg Inhale 1 puff into the lungs 2 (two) times daily. Controller 60 each 0    FREESTYLE LITE STRIPS Strp USE 1 STRIP TO CHECK GLUCOSE THREE TIMES DAILY 100 each 0    glucosamine-chondroitin  "500-400 mg tablet Take 1 tablet by mouth once daily. 90 tablet 0    insulin (LANTUS SOLOSTAR U-100 INSULIN) glargine 100 units/mL (3mL) SubQ pen Inject 55 Units into the skin every evening. 15 mL 4    insulin aspart protamine-insulin aspart (NOVOLOG 70/30) 100 unit/mL (70-30) InPn pen Inject 10 Units into the skin 2 (two) times daily before meals. 9 mL 3    ipratropium (ATROVENT HFA) 17 mcg/actuation inhaler Inhale 2 puffs into the lungs every 6 (six) hours. Rescue 12.9 g 1    lisinopril (ZESTRIL) 10 MG tablet Take 1 tablet (10 mg total) by mouth 2 (two) times daily. For blood presurre 180 tablet 0    pantoprazole (PROTONIX) 40 MG tablet Take 1 tablet (40 mg total) by mouth once daily. 90 tablet 0    pen needle, diabetic (ULTRA-THIN II INS PEN NEEDLES) 29 gauge x 1/2" Ndle 1 Stick by Misc.(Non-Drug; Combo Route) route 3 (three) times daily. 90 each 6    rifAMpin (RIFADIN) 300 MG capsule Take 2 capsules (600 mg total) by mouth every Mon, Wed, Fri. 24 capsule 5    SITagliptan-metformin (JANUMET)  mg per tablet Take 1 tablet by mouth once daily. 90 tablet 0    traMADol (ULTRAM) 50 mg tablet Take 1 tablet (50 mg total) by mouth every 4 (four) hours as needed for Pain. 20 tablet 0    traZODone (DESYREL) 100 MG tablet Take 1 tablet (100 mg total) by mouth every evening. 90 tablet 1     No facility-administered encounter medications on file as of 1/23/2020.        Plan:   Mass Lateral Segment of the Right Middle Lobe. Will consider repeating bronchoscope.   Problem List Items Addressed This Visit     Asthma exacerbation in COPD - Primary    Relevant Orders    Spirometry without Bronchodilator      Other Visit Diagnoses     Abnormal CT scan, lung            "

## 2020-02-03 LAB
ACID FAST MOD KINY STN SPEC: ABNORMAL
MYCOBACTERIUM SPEC QL CULT: ABNORMAL

## 2020-02-05 ENCOUNTER — TELEPHONE (OUTPATIENT)
Dept: PULMONOLOGY | Facility: CLINIC | Age: 70
End: 2020-02-05

## 2020-02-05 NOTE — TELEPHONE ENCOUNTER
Mrs Curran needs a lung biopsy and she is calling to find out more details about when and where it might be done. Dr Liriano said he will call her this afternoon after he finishes clinic. I relayed this message to Mrs Curran and she is fine with this.  Moira Beltran LPN

## 2020-02-05 NOTE — TELEPHONE ENCOUNTER
----- Message from Rashmi Winn sent at 2/5/2020  8:31 AM CST -----  Contact: Skip   tel:   [phone]   Caller says she has some questions.   Asking for the nurse to call her.

## 2020-02-06 ENCOUNTER — TELEPHONE (OUTPATIENT)
Dept: PULMONOLOGY | Facility: CLINIC | Age: 70
End: 2020-02-06

## 2020-02-06 DIAGNOSIS — A31.0 MAI (MYCOBACTERIUM AVIUM-INTRACELLULARE) INFECTION: ICD-10-CM

## 2020-02-06 DIAGNOSIS — R91.8 MASS OF MIDDLE LOBE OF RIGHT LUNG: ICD-10-CM

## 2020-02-06 DIAGNOSIS — R91.8 MASS OF MIDDLE LOBE OF RIGHT LUNG: Primary | ICD-10-CM

## 2020-02-06 DIAGNOSIS — R91.8 ABNORMAL CT SCAN, LUNG: Primary | ICD-10-CM

## 2020-02-13 ENCOUNTER — LAB VISIT (OUTPATIENT)
Dept: LAB | Facility: HOSPITAL | Age: 70
End: 2020-02-13
Payer: MEDICARE

## 2020-02-13 ENCOUNTER — OFFICE VISIT (OUTPATIENT)
Dept: INTERVENTIONAL RADIOLOGY/VASCULAR | Facility: CLINIC | Age: 70
End: 2020-02-13
Payer: MEDICARE

## 2020-02-13 VITALS
HEIGHT: 60 IN | HEART RATE: 72 BPM | WEIGHT: 184.75 LBS | DIASTOLIC BLOOD PRESSURE: 83 MMHG | SYSTOLIC BLOOD PRESSURE: 135 MMHG | BODY MASS INDEX: 36.27 KG/M2

## 2020-02-13 DIAGNOSIS — R91.8 MASS OF MIDDLE LOBE OF RIGHT LUNG: Primary | ICD-10-CM

## 2020-02-13 DIAGNOSIS — R91.8 MASS OF MIDDLE LOBE OF RIGHT LUNG: ICD-10-CM

## 2020-02-13 LAB
INR PPP: 1 (ref 0.8–1.2)
PROTHROMBIN TIME: 10.4 SEC (ref 9–12.5)

## 2020-02-13 PROCEDURE — 36415 COLL VENOUS BLD VENIPUNCTURE: CPT

## 2020-02-13 PROCEDURE — 99213 OFFICE O/P EST LOW 20 MIN: CPT | Mod: PBBFAC | Performed by: FAMILY MEDICINE

## 2020-02-13 PROCEDURE — 85610 PROTHROMBIN TIME: CPT

## 2020-02-13 PROCEDURE — 99214 PR OFFICE/OUTPT VISIT, EST, LEVL IV, 30-39 MIN: ICD-10-PCS | Mod: S$PBB,,, | Performed by: FAMILY MEDICINE

## 2020-02-13 PROCEDURE — 99999 PR PBB SHADOW E&M-EST. PATIENT-LVL III: CPT | Mod: PBBFAC,,, | Performed by: FAMILY MEDICINE

## 2020-02-13 PROCEDURE — 99214 OFFICE O/P EST MOD 30 MIN: CPT | Mod: S$PBB,,, | Performed by: FAMILY MEDICINE

## 2020-02-13 PROCEDURE — 99999 PR PBB SHADOW E&M-EST. PATIENT-LVL III: ICD-10-PCS | Mod: PBBFAC,,, | Performed by: FAMILY MEDICINE

## 2020-02-13 RX ORDER — ETHAMBUTOL HYDROCHLORIDE 400 MG/1
400 TABLET, FILM COATED ORAL DAILY
COMMUNITY
End: 2020-09-14

## 2020-02-13 NOTE — LETTER
February 13, 2020    Skip Curarn  [address] [place] [address]     [facility]  [address]  [facility] [address]  Phone: [phone] PRE-PROCEDURE INSTRUCTIONS    Your procedure with Interventional Radiology is scheduled for 2/24/2020. Please arrive by 9:30am.    You must check-in and receive a wristband before going to your procedure. [facility].    DO NOT take fish oil for 5 days before your procedure.    **Do not eat or drink anything between midnight and the time of your procedure. This includes gum, mints, and candy lemon drops.    **Do not smoke or drink alcoholic beverages 24 hours prior to your procedure.    **If you wear contact lenses, dentures, hearing aids, or glasses, bring a container to put them in during the procedure and give them to a family member for safekeeping.    **If you have been diagnosed with sleep apnea please bring your CPAP machine.    **If your doctor has scheduled you for an overnight stay, bring a small overnight bag with any personal items that you may need.    **Make arrangements in advance for transportation home by a responsible adult. It is not safe to drive a vehicle during the 24 hours following the procedure.    **All [facility] facilities and properties are tobacco free. Smoking is NOT allowed.    PLEASE NOTE: The procedure schedule has many variables which affect the time of your procedure. Family members should be available if your surgery time changes.    If you have any questions about these instructions call Interventional Radiology at [phone] Monday - Friday between 8:00am and 4:00pm or [phone] for after hours.

## 2020-02-13 NOTE — PROGRESS NOTES
"Subjective:       Patient ID: Skip Curran is a 69 y.o. female.    Chief Complaint: Lesion    Patient referred to Interventional Radiology by Dr. Liriano for evaluation of a right middle lobe pulmonary nodule. This nodule was incidentally found in 6/2019 during workup for gall bladder disease. A bronchoscopy was performed on 10/22/2019 and pathology revealed "Chronic bronchitis. Small focus of bland spindle-shaped cells." However, MAC-Grew from scope. She was prescribed azithromycin, rifampin, and ethambutol. She tells me she was advised to not complete this therapy as a repeat CT scan on 1/23/2020 noted the nodule appeared similar when compared to the June 2019 CT. She has a history of asthma. She has complaints of decreased activity, but attributes this to a recent back injury. She also complains of a chronic cough that is worse in the mornings. She denies any dyspnea or hemoptysis.     Review of Systems   Constitutional: Positive for activity change (due to recent back injury). Negative for appetite change, chills, fatigue and fever.   HENT: Negative for congestion, drooling, ear discharge, postnasal drip, sneezing and trouble swallowing.    Eyes: Negative for pain, discharge, redness and itching.   Respiratory: Positive for cough (worse in the morning). Negative for shortness of breath, wheezing and stridor.    Cardiovascular: Negative for chest pain, palpitations and leg swelling.   Gastrointestinal: Negative for abdominal distention, abdominal pain, constipation, diarrhea, nausea and vomiting.   Genitourinary: Negative for difficulty urinating, dysuria, frequency and urgency.   Musculoskeletal: Positive for back pain (due to recent injry). Negative for arthralgias, gait problem, joint swelling, myalgias and neck pain.   Skin: Negative for color change, pallor and rash.   Neurological: Negative for dizziness, weakness and headaches.       Objective:      Physical Exam   Constitutional: She is oriented to " person, place, and time. She appears well-developed and well-nourished. No distress.   HENT:   Head: Normocephalic and atraumatic.   Right Ear: External ear normal.   Left Ear: External ear normal.   Nose: Nose normal.   Mouth/Throat: Oropharynx is clear and moist. No oropharyngeal exudate.   Eyes: Pupils are equal, round, and reactive to light. Conjunctivae are normal. Right eye exhibits no discharge. Left eye exhibits no discharge. No scleral icterus.   Neck: Neck supple. No JVD present. No tracheal deviation present. No thyromegaly present.   Cardiovascular: Normal rate, regular rhythm, normal heart sounds and intact distal pulses. Exam reveals no gallop and no friction rub.   No murmur heard.  Pulmonary/Chest: Effort normal and breath sounds normal. No stridor. No respiratory distress. She has no wheezes. She has no rales.   Abdominal: Soft. Bowel sounds are normal. She exhibits no distension and no mass. There is no hepatosplenomegaly. There is no tenderness. There is no rebound and no guarding.   Musculoskeletal: She exhibits no edema.   Lymphadenopathy:     She has no cervical adenopathy.   Neurological: She is alert and oriented to person, place, and time. Gait normal.   Skin: Skin is warm and dry. She is not diaphoretic. No cyanosis. Nails show no clubbing.   Psychiatric: She has a normal mood and affect.   Vitals reviewed.      Imaging:    Assessment:       1. Mass of middle lobe of right lung- seen as small shadow on ct chest in 2009.        Plan:         Discussed case with Dr. Noguera. Explained to patient can offer percutaneous biopsy of RML pulmonary nodule (will also send for MAGDIEL cultures). Discussed how the procedure will be performed, risks (including, but not limited to, pain, bleeding, infection, damage to nearby structures, and the need for additional procedures), benefits, possible complications, pre-post procedure expectations, and alternatives. The patient voices understanding and all  questions have been answered.  The patient agrees to proceed as planned. Dr. Noguera in room. Written informed consent obtained. Patient scheduled for 2/24/2020. Pre-procedure handout with clinic phone number provided. Patient will have pre-procedure labs drawn today.

## 2020-02-13 NOTE — LETTER
February 14, 2020      Scooter Liriano MD  [address]  [facility] [address]           [facility]  [address]  [facility] [address]  Phone: [phone]          Patient: Skip Curran   MR Number: [id number]   YOB: 1950   Date of Visit: 2/13/2020       Dear Dr. Scooter Liriano:    Thank you for referring Skip Curran to me for evaluation. Attached you will find relevant portions of my assessment and plan of care.    If you have questions, please do not hesitate to call me. I look forward to following Skip Curran along with you.    Sincerely,    Moira Catalan, NP    Enclosure  CC:  No Recipients    If you would like to receive this communication electronically, please contact [email or web address] or [phone] to request more information on [employer] access.    For providers and/or their staff who would like to refer a patient to [facility], please contact us through our one-stop-shop provider referral line, [facility], at [phone].    If you feel you have received this communication in error or would no longer like to receive these types of communications, please e-mail [email or web address]

## 2020-02-20 DIAGNOSIS — R91.8 MASS OF MIDDLE LOBE OF RIGHT LUNG: Primary | ICD-10-CM

## 2020-02-20 LAB
ACID FAST SUSCEPTIBILITY, SLOW GROWER: ABNORMAL
SPECIMEN SOURCE AND ORGANISM NAME: ABNORMAL

## 2020-02-21 RX ORDER — FENTANYL CITRATE 50 UG/ML
50 INJECTION, SOLUTION INTRAMUSCULAR; INTRAVENOUS
Status: CANCELLED | OUTPATIENT
Start: 2020-02-21

## 2020-02-21 RX ORDER — MIDAZOLAM HYDROCHLORIDE 1 MG/ML
1 INJECTION INTRAMUSCULAR; INTRAVENOUS
Status: CANCELLED | OUTPATIENT
Start: 2020-02-21

## 2020-02-24 ENCOUNTER — HOSPITAL ENCOUNTER (OUTPATIENT)
Facility: HOSPITAL | Age: 70
Discharge: HOME | End: 2020-02-25
Attending: RADIOLOGY | Admitting: RADIOLOGY
Payer: MEDICARE

## 2020-02-24 DIAGNOSIS — R91.8 MASS OF MIDDLE LOBE OF RIGHT LUNG: ICD-10-CM

## 2020-02-24 DIAGNOSIS — R91.1 LUNG NODULE: ICD-10-CM

## 2020-02-24 DIAGNOSIS — J93.9 PNEUMOTHORAX: ICD-10-CM

## 2020-02-24 LAB
POCT GLUCOSE: 168 MG/DL (ref 70–110)
POCT GLUCOSE: 180 MG/DL (ref 70–110)
POCT GLUCOSE: 192 MG/DL (ref 70–110)

## 2020-02-24 PROCEDURE — 63600175 PHARM REV CODE 636 W HCPCS: Performed by: RADIOLOGY

## 2020-02-24 PROCEDURE — 88333 PATH CONSLTJ SURG CYTO XM 1: CPT | Performed by: PATHOLOGY

## 2020-02-24 PROCEDURE — 88305 TISSUE EXAM BY PATHOLOGIST: CPT | Mod: 26,,, | Performed by: PATHOLOGY

## 2020-02-24 PROCEDURE — 63600175 PHARM REV CODE 636 W HCPCS: Performed by: FAMILY MEDICINE

## 2020-02-24 PROCEDURE — 88305 TISSUE EXAM BY PATHOLOGIST: CPT | Performed by: PATHOLOGY

## 2020-02-24 PROCEDURE — 96372 THER/PROPH/DIAG INJ SC/IM: CPT | Performed by: RADIOLOGY

## 2020-02-24 PROCEDURE — G0378 HOSPITAL OBSERVATION PER HR: HCPCS

## 2020-02-24 PROCEDURE — 88333 PR  INTRAOPERATIVE CYTO PATH CONSULT, INITIAL SITE: ICD-10-PCS | Mod: 26,,, | Performed by: PATHOLOGY

## 2020-02-24 PROCEDURE — 25000003 PHARM REV CODE 250: Performed by: STUDENT IN AN ORGANIZED HEALTH CARE EDUCATION/TRAINING PROGRAM

## 2020-02-24 PROCEDURE — 88305 TISSUE EXAM BY PATHOLOGIST: ICD-10-PCS | Mod: 26,,, | Performed by: PATHOLOGY

## 2020-02-24 PROCEDURE — 82962 GLUCOSE BLOOD TEST: CPT

## 2020-02-24 PROCEDURE — 88333 PATH CONSLTJ SURG CYTO XM 1: CPT | Mod: 26,,, | Performed by: PATHOLOGY

## 2020-02-24 PROCEDURE — 63600175 PHARM REV CODE 636 W HCPCS: Performed by: STUDENT IN AN ORGANIZED HEALTH CARE EDUCATION/TRAINING PROGRAM

## 2020-02-24 PROCEDURE — C9399 UNCLASSIFIED DRUGS OR BIOLOG: HCPCS | Performed by: STUDENT IN AN ORGANIZED HEALTH CARE EDUCATION/TRAINING PROGRAM

## 2020-02-24 RX ORDER — INSULIN ASPART 100 [IU]/ML
0-5 INJECTION, SOLUTION INTRAVENOUS; SUBCUTANEOUS
Status: DISCONTINUED | OUTPATIENT
Start: 2020-02-24 | End: 2020-02-25 | Stop reason: HOSPADM

## 2020-02-24 RX ORDER — OXYCODONE HYDROCHLORIDE 5 MG/1
5 TABLET ORAL ONCE
Status: COMPLETED | OUTPATIENT
Start: 2020-02-24 | End: 2020-02-24

## 2020-02-24 RX ORDER — CITALOPRAM 10 MG/1
10 TABLET ORAL DAILY
Status: DISCONTINUED | OUTPATIENT
Start: 2020-02-25 | End: 2020-02-25 | Stop reason: HOSPADM

## 2020-02-24 RX ORDER — HYDROCODONE BITARTRATE AND ACETAMINOPHEN 10; 325 MG/1; MG/1
1 TABLET ORAL EVERY 6 HOURS PRN
Status: DISCONTINUED | OUTPATIENT
Start: 2020-02-24 | End: 2020-02-25 | Stop reason: HOSPADM

## 2020-02-24 RX ORDER — LISINOPRIL 5 MG/1
10 TABLET ORAL 2 TIMES DAILY
Status: DISCONTINUED | OUTPATIENT
Start: 2020-02-24 | End: 2020-02-25 | Stop reason: HOSPADM

## 2020-02-24 RX ORDER — IBUPROFEN 200 MG
24 TABLET ORAL
Status: DISCONTINUED | OUTPATIENT
Start: 2020-02-24 | End: 2020-02-25 | Stop reason: HOSPADM

## 2020-02-24 RX ORDER — FENTANYL CITRATE 50 UG/ML
INJECTION, SOLUTION INTRAMUSCULAR; INTRAVENOUS CODE/TRAUMA/SEDATION MEDICATION
Status: COMPLETED | OUTPATIENT
Start: 2020-02-24 | End: 2020-02-24

## 2020-02-24 RX ORDER — ALBUTEROL SULFATE 90 UG/1
1 AEROSOL, METERED RESPIRATORY (INHALATION) EVERY 6 HOURS PRN
Status: DISCONTINUED | OUTPATIENT
Start: 2020-02-24 | End: 2020-02-25 | Stop reason: HOSPADM

## 2020-02-24 RX ORDER — SODIUM CHLORIDE 9 MG/ML
500 INJECTION, SOLUTION INTRAVENOUS ONCE
Status: COMPLETED | OUTPATIENT
Start: 2020-02-24 | End: 2020-02-24

## 2020-02-24 RX ORDER — TRAMADOL HYDROCHLORIDE 50 MG/1
50 TABLET ORAL EVERY 12 HOURS PRN
Status: DISCONTINUED | OUTPATIENT
Start: 2020-02-24 | End: 2020-02-25 | Stop reason: HOSPADM

## 2020-02-24 RX ORDER — MIDAZOLAM HYDROCHLORIDE 1 MG/ML
INJECTION INTRAMUSCULAR; INTRAVENOUS CODE/TRAUMA/SEDATION MEDICATION
Status: COMPLETED | OUTPATIENT
Start: 2020-02-24 | End: 2020-02-24

## 2020-02-24 RX ORDER — FENOFIBRATE 145 MG/1
145 TABLET, FILM COATED ORAL DAILY
Status: DISCONTINUED | OUTPATIENT
Start: 2020-02-25 | End: 2020-02-25 | Stop reason: HOSPADM

## 2020-02-24 RX ORDER — ETHAMBUTOL HYDROCHLORIDE 400 MG/1
400 TABLET, FILM COATED ORAL DAILY
Status: DISCONTINUED | OUTPATIENT
Start: 2020-02-25 | End: 2020-02-25 | Stop reason: HOSPADM

## 2020-02-24 RX ORDER — ACETAMINOPHEN 325 MG/1
650 TABLET ORAL EVERY 4 HOURS PRN
Status: DISCONTINUED | OUTPATIENT
Start: 2020-02-24 | End: 2020-02-25 | Stop reason: HOSPADM

## 2020-02-24 RX ORDER — SODIUM CHLORIDE 0.9 % (FLUSH) 0.9 %
10 SYRINGE (ML) INJECTION
Status: DISCONTINUED | OUTPATIENT
Start: 2020-02-24 | End: 2020-02-25 | Stop reason: HOSPADM

## 2020-02-24 RX ORDER — IBUPROFEN 200 MG
16 TABLET ORAL
Status: DISCONTINUED | OUTPATIENT
Start: 2020-02-24 | End: 2020-02-25 | Stop reason: HOSPADM

## 2020-02-24 RX ORDER — PANTOPRAZOLE SODIUM 40 MG/1
40 TABLET, DELAYED RELEASE ORAL DAILY
Status: DISCONTINUED | OUTPATIENT
Start: 2020-02-25 | End: 2020-02-25 | Stop reason: HOSPADM

## 2020-02-24 RX ORDER — INSULIN ASPART 100 [IU]/ML
7 INJECTION, SOLUTION INTRAVENOUS; SUBCUTANEOUS
Status: DISCONTINUED | OUTPATIENT
Start: 2020-02-24 | End: 2020-02-25 | Stop reason: HOSPADM

## 2020-02-24 RX ORDER — FLUTICASONE FUROATE AND VILANTEROL 100; 25 UG/1; UG/1
1 POWDER RESPIRATORY (INHALATION) DAILY
Status: DISCONTINUED | OUTPATIENT
Start: 2020-02-25 | End: 2020-02-25 | Stop reason: HOSPADM

## 2020-02-24 RX ORDER — TRAZODONE HYDROCHLORIDE 100 MG/1
100 TABLET ORAL NIGHTLY
Status: DISCONTINUED | OUTPATIENT
Start: 2020-02-24 | End: 2020-02-25 | Stop reason: HOSPADM

## 2020-02-24 RX ORDER — GLUCAGON 1 MG
1 KIT INJECTION
Status: DISCONTINUED | OUTPATIENT
Start: 2020-02-24 | End: 2020-02-25 | Stop reason: HOSPADM

## 2020-02-24 RX ORDER — ONDANSETRON 8 MG/1
8 TABLET, ORALLY DISINTEGRATING ORAL EVERY 8 HOURS PRN
Status: DISCONTINUED | OUTPATIENT
Start: 2020-02-24 | End: 2020-02-25 | Stop reason: HOSPADM

## 2020-02-24 RX ADMIN — INSULIN ASPART 7 UNITS: 100 INJECTION, SOLUTION INTRAVENOUS; SUBCUTANEOUS at 06:02

## 2020-02-24 RX ADMIN — FENTANYL CITRATE 50 MCG: 50 INJECTION, SOLUTION INTRAMUSCULAR; INTRAVENOUS at 12:02

## 2020-02-24 RX ADMIN — OXYCODONE HYDROCHLORIDE 5 MG: 5 TABLET ORAL at 01:02

## 2020-02-24 RX ADMIN — TRAZODONE HYDROCHLORIDE 100 MG: 100 TABLET ORAL at 09:02

## 2020-02-24 RX ADMIN — MIDAZOLAM HYDROCHLORIDE 1 MG: 1 INJECTION, SOLUTION INTRAMUSCULAR; INTRAVENOUS at 12:02

## 2020-02-24 RX ADMIN — SODIUM CHLORIDE 500 ML: 0.9 INJECTION, SOLUTION INTRAVENOUS at 11:02

## 2020-02-24 RX ADMIN — MIDAZOLAM HYDROCHLORIDE 0.5 MG: 1 INJECTION, SOLUTION INTRAMUSCULAR; INTRAVENOUS at 04:02

## 2020-02-24 RX ADMIN — INSULIN DETEMIR 36 UNITS: 100 INJECTION, SOLUTION SUBCUTANEOUS at 09:02

## 2020-02-24 RX ADMIN — FENTANYL CITRATE 25 MCG: 50 INJECTION, SOLUTION INTRAMUSCULAR; INTRAVENOUS at 12:02

## 2020-02-24 RX ADMIN — MIDAZOLAM HYDROCHLORIDE 1 MG: 1 INJECTION, SOLUTION INTRAMUSCULAR; INTRAVENOUS at 04:02

## 2020-02-24 RX ADMIN — HYDROCODONE BITARTRATE AND ACETAMINOPHEN 1 TABLET: 10; 325 TABLET ORAL at 06:02

## 2020-02-24 RX ADMIN — MIDAZOLAM HYDROCHLORIDE 0.5 MG: 1 INJECTION, SOLUTION INTRAMUSCULAR; INTRAVENOUS at 12:02

## 2020-02-24 RX ADMIN — LISINOPRIL 10 MG: 5 TABLET ORAL at 09:02

## 2020-02-24 RX ADMIN — FENTANYL CITRATE 100 MCG: 50 INJECTION, SOLUTION INTRAMUSCULAR; INTRAVENOUS at 04:02

## 2020-02-24 NOTE — H&P
Radiology History & Physical      SUBJECTIVE:     Chief Complaint: pneumothorax    History of Present Illness:  Skip Curran is a 69 y.o. female who presents for R chest tube placement s/p lung biopsy complicated by pneumothorax.  Past Medical History:   Diagnosis Date    Allergy     Arthritis     Asthma     Back injuries     two broken bones that healed on own    Depression     Diabetes mellitus     GERD (gastroesophageal reflux disease)     Hypercholesteremia     Hypertension     Insomnia      Past Surgical History:   Procedure Laterality Date    anal fissure surgery      BREAST BIOPSY Right     benign    BRONCHOSCOPY N/A 10/22/2019    Procedure: bronch floro , noon;  Surgeon: Pepe Andersen MD;  Location: [facility];  Service: Endoscopy;  Laterality: N/A;    HYSTERECTOMY      KNEE ARTHROSCOPY W/ DEBRIDEMENT Left     x3    LIPOMA RESECTION      fatty tumor between breast    SHOULDER SURGERY Left 2/11/2016    Dr Burris     TONSILLECTOMY      WRIST RECONSTRUCTION Right     tendon       Home Meds:   Prior to Admission medications    Medication Sig Start Date End Date Taking? Authorizing Provider   calcium carbonate-vitamin D3 (CALCIUM 600 + D,3,) 600 mg calcium- 200 unit Cap Take 600 mg by mouth every evening. 8/20/18 2/24/20 Yes Heidy Albert NP   citalopram (CELEXA) 10 MG tablet Take 1 tablet (10 mg total) by mouth once daily. 2/10/20 2/9/21 Yes Heidy Albert NP   ethambutol (MYAMBUTOL) 400 MG Tab Take 400 mg by mouth once daily.   Yes Historical Provider, MD   fenofibrate (TRICOR) 145 MG tablet Take 1 tablet (145 mg total) by mouth once daily. For cholesterol 2/10/20  Yes Heidy Albert NP   insulin (LANTUS SOLOSTAR U-100 INSULIN) glargine 100 units/mL (3mL) SubQ pen Inject 45 Units into the skin every evening. 2/10/20 2/9/21 Yes Heidy Albert NP   insulin aspart protamine-insulin aspart (NOVOLOG 70/30) 100 unit/mL (70-30) InPn pen Inject 10 Units into the skin 2 (two) times daily  "before meals. For diabetes 2/10/20 2/9/21 Yes Heidy Albert NP   lisinopril (ZESTRIL) 10 MG tablet Take 1 tablet (10 mg total) by mouth 2 (two) times daily. For blood presurre 2/10/20 2/9/21 Yes Heidy Albert NP   pantoprazole (PROTONIX) 40 MG tablet Take 1 tablet (40 mg total) by mouth once daily. For stomach- reflux 2/10/20  Yes Heidy Albert NP   SITagliptan-metformin (JANUMET)  mg per tablet Take 1 tablet by mouth once daily. For diabetes 2/10/20  Yes Heidy Albert NP   traMADol (ULTRAM) 50 mg tablet Take 1 tablet (50 mg total) by mouth every 12 (twelve) hours as needed for Pain. 2/10/20 2/9/21 Yes Regina Albert MD   traZODone (DESYREL) 100 MG tablet Take 1 tablet (100 mg total) by mouth every evening. For sleep 2/10/20 2/9/21 Yes Heidy Albert NP   albuterol (ACCUNEB) 0.63 mg/3 mL Nebu Take 3 mLs (0.63 mg total) by nebulization every 6 (six) hours as needed. Rescue 2/10/20   Heidy Albert NP   blood sugar diagnostic (FREESTYLE LITE STRIPS) Strp USE 1 STRIP TO CHECK GLUCOSE THREE TIMES DAILY 2/10/20   Heidy Albert NP   fish oil-omega-3 fatty acids 300-1,000 mg capsule Take 1 capsule (1 g total) by mouth once daily. 5/30/16   Regina Albert MD   fluticasone-salmeterol diskus inhaler 250-50 mcg Inhale 1 puff into the lungs 2 (two) times daily. Controller 2/10/20 2/9/21  Heidy Albert NP   glucosamine-chondroitin 500-400 mg tablet Take 1 tablet by mouth once daily. 11/24/15   Heidy Albert NP   ipratropium (ATROVENT HFA) 17 mcg/actuation inhaler Inhale 2 puffs into the lungs every 6 (six) hours. Rescue 11/20/19   Regina Albert MD   pen needle, diabetic (ULTRA-THIN II INS PEN NEEDLES) 29 gauge x 1/2" Ndle 1 Stick by Misc.(Non-Drug; Combo Route) route 3 (three) times daily. 12/16/19   Heidy Albert NP   traMADol (ULTRAM) 50 mg tablet Take 1 tablet (50 mg total) by mouth every 4 (four) hours as needed for Pain. 11/20/19 11/19/20  Regina Albert MD "     Anticoagulants/Antiplatelets: no anticoagulation    Allergies:   Review of patient's allergies indicates:   Allergen Reactions    Aspirin Hives    Aleve [naproxen sodium] Hives    Iodinated contrast media     Iodine Other (See Comments)     WHEN INJECTED- pt  states she coded    Pcn [penicillins] Rash     Sedation History:  no adverse reactions    Review of Systems:   Hematological: no known coagulopathies  Respiratory: no shortness of breath  Cardiovascular: no chest pain  Gastrointestinal: no abdominal pain  Genito-Urinary: no dysuria  Musculoskeletal: negative  Neurological: no TIA or stroke symptoms         OBJECTIVE:     Vital Signs (Most Recent)  Temp: 98.4 °F (36.9 °C) (02/24/20 1101)  Pulse: 64 (02/24/20 1540)  Resp: 11 (02/24/20 1540)  BP: (!) 166/83 (02/24/20 1540)  SpO2: 99 % (02/24/20 1540)    Physical Exam:  ASA: 2  Mallampati: 2    General: no acute distress  Mental Status: alert and oriented to person, place and time  HEENT: normocephalic, atraumatic  Chest: unlabored breathing  Heart: regular heart rate  Abdomen: nondistended  Extremity: moves all extremities    Laboratory  Lab Results   Component Value Date    INR 1.0 02/13/2020       Lab Results   Component Value Date    WBC 5.3 02/12/2020    HGB 13.1 02/12/2020    HCT 40.9 02/12/2020    MCV 85.0 02/12/2020     02/12/2020      Lab Results   Component Value Date     (H) 02/12/2020     02/12/2020    K 4.3 02/12/2020     02/12/2020    CO2 29 02/12/2020    BUN 17 02/12/2020    CREATININE 0.75 02/12/2020    CALCIUM 10.3 02/12/2020    ALT 18 02/12/2020    AST 21 02/12/2020    ALBUMIN 4.5 02/12/2020    BILITOT 0.5 02/12/2020    BILIDIR <0.1 03/01/2004       ASSESSMENT/PLAN:     Sedation Plan: moderate  Patient will undergo R chest tube placement.    Eric Rivera M.D.  PGY-II Radiology  Pager: [phone]

## 2020-02-24 NOTE — DISCHARGE SUMMARY
Radiology Discharge Summary      Hospital Course: No complications    Admit Date: 2/24/2020  Discharge Date: 02/24/2020     Instructions Given to Patient: Yes  Diet: Resume prior diet  Activity: activity as tolerated    Description of Condition on Discharge: Stable  Vital Signs (Most Recent): Temp: 98.4 °F (36.9 °C) (02/24/20 1101)  Pulse: 60 (02/24/20 1400)  Resp: 13 (02/24/20 1400)  BP: (!) 151/77 (02/24/20 1400)  SpO2: 98 % (02/24/20 1400)    Discharge Disposition: Home    Discharge Diagnosis: Lung lesion     Follow-up: with referring    .Dionicio RIVERA M.D. personally reviewed and agree with the above dictated note.

## 2020-02-24 NOTE — H&P
Radiology History & Physical      SUBJECTIVE:     Chief Complaint: RML nodule    History of Present Illness:  Skip Curran is a 69 y.o. female who presents for CT guided biopsy of RML nodule.    Past Medical History:   Diagnosis Date    Allergy     Arthritis     Asthma     Depression     Diabetes mellitus     GERD (gastroesophageal reflux disease)     Hypercholesteremia     Hypertension     Insomnia      Past Surgical History:   Procedure Laterality Date    anal fissure surgery      BREAST BIOPSY Right     benign    BRONCHOSCOPY N/A 10/22/2019    Procedure: bronch floro , noon;  Surgeon: Pepe Andersen MD;  Location: [facility];  Service: Endoscopy;  Laterality: N/A;    HYSTERECTOMY      KNEE ARTHROSCOPY W/ DEBRIDEMENT Left     x3    LIPOMA RESECTION      fatty tumor between breast    SHOULDER SURGERY Left 2/11/2016    Dr Burris     TONSILLECTOMY      WRIST RECONSTRUCTION Right     tendon       Home Meds:   Prior to Admission medications    Medication Sig Start Date End Date Taking? Authorizing Provider   albuterol (ACCUNEB) 0.63 mg/3 mL Nebu Take 3 mLs (0.63 mg total) by nebulization every 6 (six) hours as needed. Rescue 2/10/20   Heidy Albert NP   blood sugar diagnostic (FREESTYLE LITE STRIPS) Strp USE 1 STRIP TO CHECK GLUCOSE THREE TIMES DAILY 2/10/20   Heidy Albert NP   calcium carbonate-vitamin D3 (CALCIUM 600 + D,3,) 600 mg calcium- 200 unit Cap Take 600 mg by mouth every evening. 8/20/18 11/20/19  Heidy Albert NP   citalopram (CELEXA) 10 MG tablet Take 1 tablet (10 mg total) by mouth once daily. 2/10/20 2/9/21  Heidy Albert NP   ethambutol (MYAMBUTOL) 400 MG Tab Take 400 mg by mouth once daily.    Historical Provider, MD   fenofibrate (TRICOR) 145 MG tablet Take 1 tablet (145 mg total) by mouth once daily. For cholesterol 2/10/20   Heidy Albert NP   fish oil-omega-3 fatty acids 300-1,000 mg capsule Take 1 capsule (1 g total) by mouth once daily. 5/30/16   Regina Albert,  "MD   fluticasone-salmeterol diskus inhaler 250-50 mcg Inhale 1 puff into the lungs 2 (two) times daily. Controller 2/10/20 2/9/21  Heidy Albert NP   glucosamine-chondroitin 500-400 mg tablet Take 1 tablet by mouth once daily. 11/24/15   Heidy Albert NP   insulin (LANTUS SOLOSTAR U-100 INSULIN) glargine 100 units/mL (3mL) SubQ pen Inject 45 Units into the skin every evening. 2/10/20 2/9/21  Heidy Albert NP   insulin aspart protamine-insulin aspart (NOVOLOG 70/30) 100 unit/mL (70-30) InPn pen Inject 10 Units into the skin 2 (two) times daily before meals. For diabetes 2/10/20 2/9/21  Heidy Albert NP   ipratropium (ATROVENT HFA) 17 mcg/actuation inhaler Inhale 2 puffs into the lungs every 6 (six) hours. Rescue 11/20/19   Regina Albert MD   lisinopril (ZESTRIL) 10 MG tablet Take 1 tablet (10 mg total) by mouth 2 (two) times daily. For blood presurre 2/10/20 2/9/21  Heidy Albert NP   pantoprazole (PROTONIX) 40 MG tablet Take 1 tablet (40 mg total) by mouth once daily. For stomach- reflux 2/10/20   Heidy Albert NP   pen needle, diabetic (ULTRA-THIN II INS PEN NEEDLES) 29 gauge x 1/2" Ndle 1 Stick by Misc.(Non-Drug; Combo Route) route 3 (three) times daily. 12/16/19   Heidy Albert NP   SITagliptan-metformin (JANUMET)  mg per tablet Take 1 tablet by mouth once daily. For diabetes 2/10/20   Heidy Albert NP   traMADol (ULTRAM) 50 mg tablet Take 1 tablet (50 mg total) by mouth every 4 (four) hours as needed for Pain. 11/20/19 11/19/20  Regina Albert MD   traMADol (ULTRAM) 50 mg tablet Take 1 tablet (50 mg total) by mouth every 12 (twelve) hours as needed for Pain. 2/10/20 2/9/21  Regina Albert MD   traZODone (DESYREL) 100 MG tablet Take 1 tablet (100 mg total) by mouth every evening. For sleep 2/10/20 2/9/21  Heidy Albert NP     Anticoagulants/Antiplatelets: no anticoagulation    Allergies:   Review of patient's allergies indicates:   Allergen Reactions    Aspirin Hives "    Aleve [naproxen sodium] Hives    Iodinated contrast media     Iodine Other (See Comments)     WHEN INJECTED- pt  states she coded    Pcn [penicillins] Rash     Sedation History:  no adverse reactions    Review of Systems:   Hematological: no known coagulopathies  Respiratory: no shortness of breath  Cardiovascular: no chest pain  Gastrointestinal: no abdominal pain  Genito-Urinary: no dysuria  Musculoskeletal: negative  Neurological: no TIA or stroke symptoms         OBJECTIVE:     Vital Signs (Most Recent)       Physical Exam:  ASA: 2  Mallampati: 2    General: no acute distress  Mental Status: alert and oriented to person, place and time  HEENT: normocephalic, atraumatic  Chest: unlabored breathing  Heart: regular heart rate  Abdomen: nondistended  Extremity: moves all extremities    Laboratory  Lab Results   Component Value Date    INR 1.0 02/13/2020       Lab Results   Component Value Date    WBC 5.3 02/12/2020    HGB 13.1 02/12/2020    HCT 40.9 02/12/2020    MCV 85.0 02/12/2020     02/12/2020      Lab Results   Component Value Date     (H) 02/12/2020     02/12/2020    K 4.3 02/12/2020     02/12/2020    CO2 29 02/12/2020    BUN 17 02/12/2020    CREATININE 0.75 02/12/2020    CALCIUM 10.3 02/12/2020    ALT 18 02/12/2020    AST 21 02/12/2020    ALBUMIN 4.5 02/12/2020    BILITOT 0.5 02/12/2020    BILIDIR <0.1 03/01/2004       ASSESSMENT/PLAN:     Sedation Plan: moderate  Patient will undergo CT guided RML nodule biopsy.    Eric Rivera M.D.  PGY-II Radiology  Pager: [phone]

## 2020-02-24 NOTE — PROGRESS NOTES
Pt arrived to [facility] bed 4 for 2 hour post lung recovery. Report received from VERNELL Salmon. Pt complains pain/discomfort. Dressing CDI. VSS. No acute events.  See flow sheets for post procedure monitoring.

## 2020-02-24 NOTE — PROCEDURES
Radiology Post Procedure Note:    Procedure: Right [ middle]  lobe lesion biopsy    (s): [place]    Blood Loss: Minimal    Specimen: 3 x 20 G core specimens    Findings:     Patient came to IR and under imaging guidance had a a 19 G introducer needle placed into a right  [middle ] lobe lesion. Subsequently, 3 x 20 G core samples obtained and confirmed by the pathologist.     No evidence of immediate complication.     Full dictation to follow.     Dionicio RIVERA M.D. personally reviewed and agree with the above dictated note.

## 2020-02-24 NOTE — CARE UPDATE
Pneumothorax developed after lung biopsy. Patient will have chest tube placed and admitted to observation.  Home medications reordered.    Eric Rivera M.D.  PGY-II Radiology  Pager: [phone]

## 2020-02-24 NOTE — PLAN OF CARE
Procedure complete, patient tolerated well, [facility] nurse called and report given, All VSS, will receive an Xray in 1 hour and then another in 2 hours,, order also given for O2 at 2L during recovery,,,,

## 2020-02-24 NOTE — CARE UPDATE
Chest tube to wall suction overnight.  Patient will be evaluated by radiology in the morning and if lung is still expanded, will clamp chest tube.  Repeat CXR 1 hour after clamping and if lung is still inflated at that time, we can pull the tube and patient can be discharged.    Eric Rivera M.D.  PGY-II Radiology  Pager: [phone]

## 2020-02-24 NOTE — PROCEDURES
Radiology Post-Procedure Note    Pre Op Diagnosis: Post Procedure Pneumothorax    Post Op Diagnosis: Same    Procedure: Right Chest Tube Removal    Procedure performed by: Virginia    Written Informed Consent Obtained: Yes    Specimen Removed: NO    Estimated Blood Loss: Minimal    Findings: Local anesthesia and moderate sedation were used.    A right anterior approach was used to insert a 8.0-Maltese  all-purpose drainage catheter into the pleural space using CT guidance. Post Procedure imaging demonstrates complete resolution of the pneumothorax.     The patient tolerated the procedure well and there were no complications.  Please see Imaging report for further details.    .Dionicio RIVERA M.D. personally reviewed and agree with the above dictated note.

## 2020-02-24 NOTE — DISCHARGE INSTRUCTIONS
[facility] MONDAY-FRIDAY 8AM-5PM CALL [phone]  AFTER HOURS CALL [phone] ASK FOR RADIOLOGY RESIDENT

## 2020-02-24 NOTE — PLAN OF CARE
Procedure complete, patient tolerated well, all VSS, chest tube placed right upper chest wall and is to remain on low wall suction,,, Report called to [facility] nurse, then patient will be transported to room,,,

## 2020-02-25 VITALS
TEMPERATURE: 98 F | BODY MASS INDEX: 35.73 KG/M2 | HEIGHT: 60 IN | DIASTOLIC BLOOD PRESSURE: 77 MMHG | OXYGEN SATURATION: 95 % | WEIGHT: 182 LBS | SYSTOLIC BLOOD PRESSURE: 122 MMHG | HEART RATE: 75 BPM | RESPIRATION RATE: 18 BRPM

## 2020-02-25 LAB
POCT GLUCOSE: 130 MG/DL (ref 70–110)
POCT GLUCOSE: 149 MG/DL (ref 70–110)

## 2020-02-25 PROCEDURE — 96372 THER/PROPH/DIAG INJ SC/IM: CPT | Performed by: RADIOLOGY

## 2020-02-25 PROCEDURE — 25000003 PHARM REV CODE 250: Performed by: STUDENT IN AN ORGANIZED HEALTH CARE EDUCATION/TRAINING PROGRAM

## 2020-02-25 PROCEDURE — G0378 HOSPITAL OBSERVATION PER HR: HCPCS

## 2020-02-25 RX ORDER — OXYCODONE AND ACETAMINOPHEN 5; 325 MG/1; MG/1
1 TABLET ORAL EVERY 8 HOURS PRN
Qty: 12 TABLET | Refills: 0 | Status: SHIPPED | OUTPATIENT
Start: 2020-02-25 | End: 2020-02-29

## 2020-02-25 RX ORDER — HYDROCODONE BITARTRATE AND ACETAMINOPHEN 5; 325 MG/1; MG/1
1 TABLET ORAL ONCE
Status: COMPLETED | OUTPATIENT
Start: 2020-02-25 | End: 2020-02-25

## 2020-02-25 RX ADMIN — HYDROCODONE BITARTRATE AND ACETAMINOPHEN 1 TABLET: 10; 325 TABLET ORAL at 11:02

## 2020-02-25 RX ADMIN — TRAMADOL HYDROCHLORIDE 50 MG: 50 TABLET, FILM COATED ORAL at 06:02

## 2020-02-25 RX ADMIN — INSULIN ASPART 7 UNITS: 100 INJECTION, SOLUTION INTRAVENOUS; SUBCUTANEOUS at 11:02

## 2020-02-25 RX ADMIN — HYDROCODONE BITARTRATE AND ACETAMINOPHEN 1 TABLET: 10; 325 TABLET ORAL at 12:02

## 2020-02-25 RX ADMIN — CITALOPRAM HYDROBROMIDE 10 MG: 10 TABLET ORAL at 07:02

## 2020-02-25 RX ADMIN — INSULIN ASPART 7 UNITS: 100 INJECTION, SOLUTION INTRAVENOUS; SUBCUTANEOUS at 07:02

## 2020-02-25 RX ADMIN — HYDROCODONE BITARTRATE AND ACETAMINOPHEN 1 TABLET: 5; 325 TABLET ORAL at 01:02

## 2020-02-25 RX ADMIN — PANTOPRAZOLE SODIUM 40 MG: 40 TABLET, DELAYED RELEASE ORAL at 07:02

## 2020-02-25 RX ADMIN — FENOFIBRATE 145 MG: 145 TABLET, FILM COATED ORAL at 08:02

## 2020-02-25 RX ADMIN — LISINOPRIL 10 MG: 5 TABLET ORAL at 07:02

## 2020-02-25 RX ADMIN — ETHAMBUTOL HYDROCHLORIDE 400 MG: 400 TABLET, FILM COATED ORAL at 08:02

## 2020-02-25 NOTE — PLAN OF CARE
Problem: Adult Inpatient Plan of Care  Goal: Plan of Care Review  Outcome: Ongoing, Progressing  Flowsheets (Taken 2/24/2020 1947)  Plan of Care Reviewed With: patient  Goal: Patient-Specific Goal (Individualization)  Outcome: Ongoing, Progressing  Flowsheets (Taken 2/24/2020 1947)  Individualized Care Needs: history of falls.  Anxieties, Fears or Concerns: fear of falling

## 2020-02-25 NOTE — NURSING
Pt discharged to home via WC to car in the garage per this RN.  NAD.  AVS reviewed with pt and family at bedside.  Rx for percocet per Dr. Tabitha LEWIS hand delivered to pt. For pt to fill at her preferred pharmacy.  Pt and family give verbal understanding of discharge instructions.  Dressing to Right Chest wall clean, dry and intact.

## 2020-02-25 NOTE — NURSING
Pt arrived and was admitted to [facility]U with chest tube. Her chest tube was hooked up to low suction per MD order.   Pt VSS, AOx4.  Her fall risk, pt armband, and an allergy label were placed per protocol.  Pt and family were oriented to room.   She was medicated for pain with PRN hydrocodone on admit. All safety checks perfomed, menu was provided for dinner.

## 2020-02-25 NOTE — PLAN OF CARE
Rested comfortably most of night, VSS, NAD. 23HR Observation admit, Had lung bx in IR done today, developed pneumothorax, chest tube placed, if cleared today will DC home. O2 2L NC on.

## 2020-02-25 NOTE — CARE UPDATE
Went up to patient's room for assessment status post chest tube insertion for pneumothorax. Review of AM chest radiograph was perfomred. No acute abnormalities were identified. Chest tube was clamped, then repeat radiograph was performed. This also showed no acute abnormalities. Chest tube was pulled. Patient endorsed chest wall pain, but denied any SOB or other symptoms. Patient was given pain medication, and instructed to void when ready. Patient can be discharged once given pain medication and upon voiding. Please contact interventional radiology with any issues.     Deandre Lu M.D.  PGY-3  Radiology

## 2020-02-25 NOTE — DISCHARGE SUMMARY
Radiology Discharge Summary      Hospital Course: No complications    Admit Date: 2/24/2020  Discharge Date: 02/25/2020     Instructions Given to Patient: Yes  Diet: regular diet and Resume prior diet  Activity: activity as tolerated    Description of Condition on Discharge: Stable  Vital Signs (Most Recent): Temp: 97.7 °F (36.5 °C) (02/25/20 1218)  Pulse: 75 (02/25/20 1218)  Resp: 18 (02/25/20 1218)  BP: 122/77 (02/25/20 1218)  SpO2: 95 % (02/25/20 1218)    Discharge Disposition: Home    Discharge Diagnosis: Post lung nodule biopsy with pneumothorax. Status post chest tube insertion then interval removal     Follow-up: As needed with PCP    Deandre Lu M.D.  PGY-3  Radiology

## 2020-02-28 LAB
ADEQUACY: NORMAL
FINAL PATHOLOGIC DIAGNOSIS: NORMAL
GROSS: NORMAL

## 2020-07-09 ENCOUNTER — LAB VISIT (OUTPATIENT)
Dept: PRIMARY CARE CLINIC | Facility: OTHER | Age: 70
End: 2020-07-09
Attending: INTERNAL MEDICINE
Payer: MEDICARE

## 2020-07-09 DIAGNOSIS — Z11.59 SPECIAL SCREENING EXAMINATION FOR UNSPECIFIED VIRAL DISEASE: ICD-10-CM

## 2020-07-09 PROCEDURE — U0003 INFECTIOUS AGENT DETECTION BY NUCLEIC ACID (DNA OR RNA); SEVERE ACUTE RESPIRATORY SYNDROME CORONAVIRUS 2 (SARS-COV-2) (CORONAVIRUS DISEASE [COVID-19]), AMPLIFIED PROBE TECHNIQUE, MAKING USE OF HIGH THROUGHPUT TECHNOLOGIES AS DESCRIBED BY CMS-2020-01-R: HCPCS

## 2020-07-12 LAB — SARS-COV-2 RNA RESP QL NAA+PROBE: NEGATIVE

## 2020-07-23 DIAGNOSIS — Z86.010 HISTORY OF COLON POLYPS: Primary | ICD-10-CM

## 2020-08-10 ENCOUNTER — TELEPHONE (OUTPATIENT)
Dept: SURGERY | Facility: CLINIC | Age: 70
End: 2020-08-10

## 2020-08-10 DIAGNOSIS — Z12.11 SCREENING FOR COLON CANCER: Primary | ICD-10-CM

## 2020-08-10 RX ORDER — SODIUM CHLORIDE 0.9 % (FLUSH) 0.9 %
3 SYRINGE (ML) INJECTION
Status: CANCELLED | OUTPATIENT
Start: 2020-08-10

## 2020-08-10 RX ORDER — SODIUM CHLORIDE, SODIUM LACTATE, POTASSIUM CHLORIDE, CALCIUM CHLORIDE 600; 310; 30; 20 MG/100ML; MG/100ML; MG/100ML; MG/100ML
INJECTION, SOLUTION INTRAVENOUS CONTINUOUS
Status: CANCELLED | OUTPATIENT
Start: 2020-08-10

## 2020-08-10 NOTE — TELEPHONE ENCOUNTER
Patient called in reference to a referral to Colorectal Surgery for colon cancer screening. Patient verbally consented to a Colonoscopy and requested to be scheduled for a Colonoscopy on 09/10/2020. Patient was advised a designated  is required on the day of the Colonoscopy to drive the patient home and the  must be at least. 18 years old.Colonoscopy Prep instructions were thoroughly explained and discussed with the patient. Patient acknowledges understanding Prep instructions. Patient was advised the Colonoscopy Prep instructions discussed and explained on the phone , are being mailed out to the patient's address on file. Patient's address on file was verified with the patient for accuracy of mailing. Patient's medications on file was reviewed with the patient for accuracy of information. Patient denies taking  any other medications other than those listed and verified on medication profile.Patient was explained the Colonoscopy will be performed here at [facility]. Patient was further explained the Pre-Op will call one day prior to the procedure to discuss Pre-Op instructions and what time to report for the Colonoscopy. The patient was given the opportunity to ask any questions about the Colonoscopy. No further issues were discussed.

## 2020-08-11 RX ORDER — POLYETHYLENE GLYCOL 3350, SODIUM SULFATE ANHYDROUS, SODIUM BICARBONATE, SODIUM CHLORIDE, POTASSIUM CHLORIDE 236; 22.74; 6.74; 5.86; 2.97 G/4L; G/4L; G/4L; G/4L; G/4L
4 POWDER, FOR SOLUTION ORAL ONCE
Qty: 4000 ML | Refills: 0 | Status: SHIPPED | OUTPATIENT
Start: 2020-08-11 | End: 2020-08-11

## 2020-08-14 ENCOUNTER — TELEPHONE (OUTPATIENT)
Dept: SURGERY | Facility: CLINIC | Age: 70
End: 2020-08-14

## 2020-08-14 DIAGNOSIS — Z12.11 SCREENING FOR COLON CANCER: Primary | ICD-10-CM

## 2020-08-14 RX ORDER — POLYETHYLENE GLYCOL 3350, SODIUM SULFATE ANHYDROUS, SODIUM BICARBONATE, SODIUM CHLORIDE, POTASSIUM CHLORIDE 236; 22.74; 6.74; 5.86; 2.97 G/4L; G/4L; G/4L; G/4L; G/4L
4000 POWDER, FOR SOLUTION ORAL ONCE
Status: CANCELLED | OUTPATIENT
Start: 2020-08-14 | End: 2020-08-14

## 2020-08-14 NOTE — TELEPHONE ENCOUNTER
The patient has been advised the Colonoscopy Prep Kit will be ordered from the patient's preferred verified pharmacy on file, and will be picked up by the patient, or the patient's designated representative. The patient was further explained Colonoscopy Prep instructions will be mailed to the patient verified address of accuracy on file, and the patient is to follow the Colonoscopy Prep instructions being mailed as meticulously as possible Patient will receive a follow up phone call to summarize the Colonoscopy Prep instructions, prior to the scheduled Colonoscopy procedure date, at which time the patient will be given an opportunity to ask any questions regarding the Colonoscopy Prep and procedure.

## 2020-08-14 NOTE — TELEPHONE ENCOUNTER
Called the patient to reschedule the Colonoscopy with Dr Magno Gant. Patient requested to be scheduled for the Colonoscopy on 09/18/2020. Swapna in scheduling notified of request for change in Colonoscopy date. Moira in Pre - Op notified also.

## 2020-08-17 ENCOUNTER — TELEPHONE (OUTPATIENT)
Dept: SURGERY | Facility: CLINIC | Age: 70
End: 2020-08-17

## 2020-08-17 NOTE — TELEPHONE ENCOUNTER
----- Message from Ashley Gaviria RN sent at 8/14/2020  5:07 PM CDT -----  I am unsure what colonoscopy prep kit that you are talking about.  If it is the Golytely prescription, you can send the prescription request directly to Dr. Gant for approval.  The endoscopy schedulers handle this for [facility] colonoscopy patients.     Thank you and have a great weekend,  Ashley Pinon

## 2020-08-17 NOTE — TELEPHONE ENCOUNTER
Returned patient's call in reference to which Colonoscopy Prep Kit and where to send the order for the Prep Kit. Patient states I already have the Prep Kit that was ordered.

## 2020-09-14 ENCOUNTER — TELEPHONE (OUTPATIENT)
Dept: SURGERY | Facility: CLINIC | Age: 70
End: 2020-09-14

## 2020-09-15 PROBLEM — Z80.3 FH: BREAST CANCER: Status: ACTIVE | Noted: 2020-09-15

## 2020-10-23 ENCOUNTER — HOSPITAL ENCOUNTER (OUTPATIENT)
Dept: RADIOLOGY | Facility: HOSPITAL | Age: 70
Discharge: HOME | End: 2020-10-23
Attending: PHYSICAL MEDICINE & REHABILITATION
Payer: COMMERCIAL

## 2020-10-23 DIAGNOSIS — R93.89 ABNORMAL CXR: ICD-10-CM

## 2020-10-23 PROCEDURE — 74176 CT ABD & PELVIS W/O CONTRAST: CPT | Mod: TC

## 2020-10-23 PROCEDURE — 71250 CT THORAX DX C-: CPT | Mod: TC

## 2020-11-10 RX ORDER — POLYETHYLENE GLYCOL 3350, SODIUM SULFATE ANHYDROUS, SODIUM BICARBONATE, SODIUM CHLORIDE, POTASSIUM CHLORIDE 236; 22.74; 6.74; 5.86; 2.97 G/4L; G/4L; G/4L; G/4L; G/4L
4 POWDER, FOR SOLUTION ORAL ONCE
Qty: 4000 ML | Refills: 0 | OUTPATIENT
Start: 2020-11-10 | End: 2020-11-10

## 2021-01-07 ENCOUNTER — PATIENT MESSAGE (OUTPATIENT)
Dept: PULMONOLOGY | Facility: CLINIC | Age: 71
End: 2021-01-07

## 2021-01-29 ENCOUNTER — TELEPHONE (OUTPATIENT)
Dept: DIABETES | Facility: CLINIC | Age: 71
End: 2021-01-29

## 2021-02-15 ENCOUNTER — OFFICE VISIT (OUTPATIENT)
Dept: DIABETES | Facility: CLINIC | Age: 71
End: 2021-02-15
Payer: COMMERCIAL

## 2021-02-15 VITALS
BODY MASS INDEX: 33.93 KG/M2 | SYSTOLIC BLOOD PRESSURE: 127 MMHG | WEIGHT: 172.81 LBS | HEART RATE: 57 BPM | DIASTOLIC BLOOD PRESSURE: 69 MMHG | HEIGHT: 60 IN | OXYGEN SATURATION: 97 %

## 2021-02-15 DIAGNOSIS — Z59.9 FINANCIAL DIFFICULTIES: ICD-10-CM

## 2021-02-15 DIAGNOSIS — E78.5 DYSLIPIDEMIA, GOAL LDL BELOW 100: ICD-10-CM

## 2021-02-15 DIAGNOSIS — Z79.4 TYPE 2 DIABETES MELLITUS WITH HYPERGLYCEMIA, WITH LONG-TERM CURRENT USE OF INSULIN: Primary | ICD-10-CM

## 2021-02-15 DIAGNOSIS — I10 ESSENTIAL HYPERTENSION: ICD-10-CM

## 2021-02-15 DIAGNOSIS — Z71.9 HEALTH EDUCATION/COUNSELING: ICD-10-CM

## 2021-02-15 DIAGNOSIS — E11.65 TYPE 2 DIABETES MELLITUS WITH HYPERGLYCEMIA, WITH LONG-TERM CURRENT USE OF INSULIN: Primary | ICD-10-CM

## 2021-02-15 DIAGNOSIS — E66.09 CLASS 1 OBESITY DUE TO EXCESS CALORIES WITH SERIOUS COMORBIDITY AND BODY MASS INDEX (BMI) OF 33.0 TO 33.9 IN ADULT: ICD-10-CM

## 2021-02-15 PROCEDURE — 99204 OFFICE O/P NEW MOD 45 MIN: CPT | Mod: S$PBB,,, | Performed by: NURSE PRACTITIONER

## 2021-02-15 PROCEDURE — 99215 OFFICE O/P EST HI 40 MIN: CPT | Mod: PBBFAC,PN | Performed by: NURSE PRACTITIONER

## 2021-02-15 PROCEDURE — 99999 PR PBB SHADOW E&M-EST. PATIENT-LVL V: CPT | Mod: PBBFAC,,, | Performed by: NURSE PRACTITIONER

## 2021-02-15 PROCEDURE — 99204 PR OFFICE/OUTPT VISIT, NEW, LEVL IV, 45-59 MIN: ICD-10-PCS | Mod: S$PBB,,, | Performed by: NURSE PRACTITIONER

## 2021-02-15 PROCEDURE — 99999 PR PBB SHADOW E&M-EST. PATIENT-LVL V: ICD-10-PCS | Mod: PBBFAC,,, | Performed by: NURSE PRACTITIONER

## 2021-02-15 RX ORDER — GLUCAGON 3 MG/1
POWDER NASAL
Qty: 2 EACH | Refills: 1 | Status: SHIPPED | OUTPATIENT
Start: 2021-02-15

## 2021-02-15 RX ORDER — INSULIN ASPART 100 [IU]/ML
INJECTION, SOLUTION INTRAVENOUS; SUBCUTANEOUS
Qty: 30 ML | Refills: 6 | Status: SHIPPED | OUTPATIENT
Start: 2021-02-15 | End: 2021-03-10 | Stop reason: CLARIF

## 2021-02-15 RX ORDER — METFORMIN HYDROCHLORIDE 500 MG/1
500 TABLET, EXTENDED RELEASE ORAL 2 TIMES DAILY WITH MEALS
Qty: 180 TABLET | Refills: 1 | Status: SHIPPED | OUTPATIENT
Start: 2021-02-15 | End: 2021-08-03 | Stop reason: SDUPTHER

## 2021-02-15 RX ORDER — SUB-Q INSULIN DEVICE, 40 UNIT
1 EACH MISCELLANEOUS DAILY
Qty: 30 DEVICE | Refills: 6 | Status: SHIPPED | OUTPATIENT
Start: 2021-02-15 | End: 2021-04-16

## 2021-02-15 SDOH — SOCIAL DETERMINANTS OF HEALTH (SDOH): PROBLEM RELATED TO HOUSING AND ECONOMIC CIRCUMSTANCES, UNSPECIFIED: Z59.9

## 2021-03-03 ENCOUNTER — CLINICAL SUPPORT (OUTPATIENT)
Dept: DIABETES | Facility: CLINIC | Age: 71
End: 2021-03-03
Payer: MEDICARE

## 2021-03-03 DIAGNOSIS — E11.65 TYPE 2 DIABETES MELLITUS WITH HYPERGLYCEMIA, WITH LONG-TERM CURRENT USE OF INSULIN: ICD-10-CM

## 2021-03-03 DIAGNOSIS — Z79.4 TYPE 2 DIABETES MELLITUS WITH HYPERGLYCEMIA, WITH LONG-TERM CURRENT USE OF INSULIN: ICD-10-CM

## 2021-03-03 PROCEDURE — 99999 PR PBB SHADOW E&M-EST. PATIENT-LVL I: ICD-10-PCS | Mod: PBBFAC,,, | Performed by: DIETITIAN, REGISTERED

## 2021-03-03 PROCEDURE — G0108 DIAB MANAGE TRN  PER INDIV: HCPCS | Mod: PBBFAC,PN | Performed by: DIETITIAN, REGISTERED

## 2021-03-03 PROCEDURE — 99999 PR PBB SHADOW E&M-EST. PATIENT-LVL I: CPT | Mod: PBBFAC,,, | Performed by: DIETITIAN, REGISTERED

## 2021-03-10 ENCOUNTER — HOSPITAL ENCOUNTER (OUTPATIENT)
Dept: PREADMISSION TESTING | Facility: OTHER | Age: 71
Discharge: HOME | End: 2021-03-10
Attending: ORTHOPAEDIC SURGERY
Payer: MEDICARE

## 2021-03-10 ENCOUNTER — ANESTHESIA EVENT (OUTPATIENT)
Dept: SURGERY | Facility: OTHER | Age: 71
End: 2021-03-10
Payer: MEDICARE

## 2021-03-10 VITALS
TEMPERATURE: 98 F | HEIGHT: 60 IN | WEIGHT: 172 LBS | HEART RATE: 53 BPM | OXYGEN SATURATION: 96 % | BODY MASS INDEX: 33.77 KG/M2 | DIASTOLIC BLOOD PRESSURE: 78 MMHG | SYSTOLIC BLOOD PRESSURE: 191 MMHG

## 2021-03-10 DIAGNOSIS — Z01.818 PRE-OP TESTING: ICD-10-CM

## 2021-03-10 LAB
ANION GAP SERPL CALC-SCNC: 9 MMOL/L (ref 8–16)
BASOPHILS # BLD AUTO: 0.05 K/UL (ref 0–0.2)
BASOPHILS NFR BLD: 0.7 % (ref 0–1.9)
BILIRUB UR QL STRIP: NEGATIVE
BUN SERPL-MCNC: 18 MG/DL (ref 8–23)
CALCIUM SERPL-MCNC: 10.1 MG/DL (ref 8.7–10.5)
CHLORIDE SERPL-SCNC: 105 MMOL/L (ref 95–110)
CLARITY UR: CLEAR
CO2 SERPL-SCNC: 25 MMOL/L (ref 23–29)
COLOR UR: YELLOW
CREAT SERPL-MCNC: 0.8 MG/DL (ref 0.5–1.4)
DIFFERENTIAL METHOD: ABNORMAL
EOSINOPHIL # BLD AUTO: 0.2 K/UL (ref 0–0.5)
EOSINOPHIL NFR BLD: 3 % (ref 0–8)
ERYTHROCYTE [DISTWIDTH] IN BLOOD BY AUTOMATED COUNT: 13.2 % (ref 11.5–14.5)
EST. GFR  (AFRICAN AMERICAN): >60 ML/MIN/1.73 M^2
EST. GFR  (NON AFRICAN AMERICAN): >60 ML/MIN/1.73 M^2
GLUCOSE SERPL-MCNC: 140 MG/DL (ref 70–110)
GLUCOSE UR QL STRIP: NEGATIVE
HCT VFR BLD AUTO: 38.5 % (ref 37–48.5)
HGB BLD-MCNC: 12.1 G/DL (ref 12–16)
HGB UR QL STRIP: NEGATIVE
IMM GRANULOCYTES # BLD AUTO: 0.02 K/UL (ref 0–0.04)
IMM GRANULOCYTES NFR BLD AUTO: 0.3 % (ref 0–0.5)
KETONES UR QL STRIP: NEGATIVE
LEUKOCYTE ESTERASE UR QL STRIP: NEGATIVE
LYMPHOCYTES # BLD AUTO: 2.1 K/UL (ref 1–4.8)
LYMPHOCYTES NFR BLD: 28.2 % (ref 18–48)
MCH RBC QN AUTO: 26.1 PG (ref 27–31)
MCHC RBC AUTO-ENTMCNC: 31.4 G/DL (ref 32–36)
MCV RBC AUTO: 83 FL (ref 82–98)
MONOCYTES # BLD AUTO: 0.5 K/UL (ref 0.3–1)
MONOCYTES NFR BLD: 7.2 % (ref 4–15)
NEUTROPHILS # BLD AUTO: 4.5 K/UL (ref 1.8–7.7)
NEUTROPHILS NFR BLD: 60.6 % (ref 38–73)
NITRITE UR QL STRIP: NEGATIVE
NRBC BLD-RTO: 0 /100 WBC
PH UR STRIP: 5 [PH] (ref 5–8)
PLATELET # BLD AUTO: 314 K/UL (ref 150–350)
PMV BLD AUTO: 9.6 FL (ref 9.2–12.9)
POTASSIUM SERPL-SCNC: 4.3 MMOL/L (ref 3.5–5.1)
PROT UR QL STRIP: NEGATIVE
RBC # BLD AUTO: 4.63 M/UL (ref 4–5.4)
SODIUM SERPL-SCNC: 139 MMOL/L (ref 136–145)
SP GR UR STRIP: >=1.03 (ref 1–1.03)
URN SPEC COLLECT METH UR: ABNORMAL
UROBILINOGEN UR STRIP-ACNC: NEGATIVE EU/DL
WBC # BLD AUTO: 7.45 K/UL (ref 3.9–12.7)

## 2021-03-10 PROCEDURE — 93010 ELECTROCARDIOGRAM REPORT: CPT | Mod: ,,, | Performed by: INTERNAL MEDICINE

## 2021-03-10 PROCEDURE — 93010 EKG 12-LEAD: ICD-10-PCS | Mod: ,,, | Performed by: INTERNAL MEDICINE

## 2021-03-10 PROCEDURE — 85025 COMPLETE CBC W/AUTO DIFF WBC: CPT | Performed by: ORTHOPAEDIC SURGERY

## 2021-03-10 PROCEDURE — 81003 URINALYSIS AUTO W/O SCOPE: CPT | Performed by: ORTHOPAEDIC SURGERY

## 2021-03-10 PROCEDURE — 80048 BASIC METABOLIC PNL TOTAL CA: CPT | Performed by: ORTHOPAEDIC SURGERY

## 2021-03-10 PROCEDURE — 36415 COLL VENOUS BLD VENIPUNCTURE: CPT | Performed by: ORTHOPAEDIC SURGERY

## 2021-03-10 PROCEDURE — 93005 ELECTROCARDIOGRAM TRACING: CPT

## 2021-03-10 RX ORDER — PREGABALIN 75 MG/1
75 CAPSULE ORAL ONCE
Status: CANCELLED | OUTPATIENT
Start: 2021-03-10 | End: 2021-03-10

## 2021-03-10 RX ORDER — ACETAMINOPHEN 500 MG
1000 TABLET ORAL
Status: CANCELLED | OUTPATIENT
Start: 2021-03-10 | End: 2021-03-10

## 2021-03-10 RX ORDER — LIDOCAINE HYDROCHLORIDE 10 MG/ML
0.5 INJECTION, SOLUTION EPIDURAL; INFILTRATION; INTRACAUDAL; PERINEURAL ONCE
Status: CANCELLED | OUTPATIENT
Start: 2021-03-10 | End: 2021-03-10

## 2021-03-10 RX ORDER — SODIUM CHLORIDE, SODIUM LACTATE, POTASSIUM CHLORIDE, CALCIUM CHLORIDE 600; 310; 30; 20 MG/100ML; MG/100ML; MG/100ML; MG/100ML
INJECTION, SOLUTION INTRAVENOUS CONTINUOUS
Status: CANCELLED | OUTPATIENT
Start: 2021-03-10

## 2021-03-15 ENCOUNTER — NUTRITION (OUTPATIENT)
Dept: DIABETES | Facility: CLINIC | Age: 71
End: 2021-03-15
Payer: MEDICARE

## 2021-03-15 DIAGNOSIS — E11.65 TYPE 2 DIABETES MELLITUS WITH HYPERGLYCEMIA, WITH LONG-TERM CURRENT USE OF INSULIN: Primary | ICD-10-CM

## 2021-03-15 DIAGNOSIS — Z79.4 TYPE 2 DIABETES MELLITUS WITH HYPERGLYCEMIA, WITH LONG-TERM CURRENT USE OF INSULIN: Primary | ICD-10-CM

## 2021-03-15 PROCEDURE — G0108 DIAB MANAGE TRN  PER INDIV: HCPCS | Mod: PBBFAC,PN | Performed by: DIETITIAN, REGISTERED

## 2021-03-18 ENCOUNTER — ANESTHESIA (OUTPATIENT)
Dept: SURGERY | Facility: OTHER | Age: 71
End: 2021-03-18
Payer: MEDICARE

## 2021-03-18 ENCOUNTER — HOSPITAL ENCOUNTER (OUTPATIENT)
Facility: OTHER | Age: 71
Discharge: HOME | End: 2021-03-19
Attending: ORTHOPAEDIC SURGERY | Admitting: ORTHOPAEDIC SURGERY
Payer: MEDICARE

## 2021-03-18 DIAGNOSIS — M17.9 OA (OSTEOARTHRITIS) OF KNEE: ICD-10-CM

## 2021-03-18 LAB
GLUCOSE SERPL-MCNC: 141 MG/DL (ref 70–110)
GLUCOSE SERPL-MCNC: 171 MG/DL (ref 70–110)
POCT GLUCOSE: 154 MG/DL (ref 70–110)

## 2021-03-18 PROCEDURE — 97163 PT EVAL HIGH COMPLEX 45 MIN: CPT

## 2021-03-18 PROCEDURE — 97530 THERAPEUTIC ACTIVITIES: CPT

## 2021-03-18 PROCEDURE — 64447 NJX AA&/STRD FEMORAL NRV IMG: CPT | Performed by: ANESTHESIOLOGY

## 2021-03-18 PROCEDURE — 63600175 PHARM REV CODE 636 W HCPCS: Performed by: ORTHOPAEDIC SURGERY

## 2021-03-18 PROCEDURE — 76942 ECHO GUIDE FOR BIOPSY: CPT | Performed by: ANESTHESIOLOGY

## 2021-03-18 PROCEDURE — 37000009 HC ANESTHESIA EA ADD 15 MINS: Performed by: ORTHOPAEDIC SURGERY

## 2021-03-18 PROCEDURE — C9290 INJ, BUPIVACAINE LIPOSOME: HCPCS | Performed by: ORTHOPAEDIC SURGERY

## 2021-03-18 PROCEDURE — 25000003 PHARM REV CODE 250: Performed by: NURSE PRACTITIONER

## 2021-03-18 PROCEDURE — 27201423 OPTIME MED/SURG SUP & DEVICES STERILE SUPPLY: Performed by: ORTHOPAEDIC SURGERY

## 2021-03-18 PROCEDURE — 63600175 PHARM REV CODE 636 W HCPCS: Performed by: NURSE ANESTHETIST, CERTIFIED REGISTERED

## 2021-03-18 PROCEDURE — C1776 JOINT DEVICE (IMPLANTABLE): HCPCS | Performed by: ORTHOPAEDIC SURGERY

## 2021-03-18 PROCEDURE — 97110 THERAPEUTIC EXERCISES: CPT

## 2021-03-18 PROCEDURE — 36000710: Performed by: ORTHOPAEDIC SURGERY

## 2021-03-18 PROCEDURE — 71000033 HC RECOVERY, INTIAL HOUR: Performed by: ORTHOPAEDIC SURGERY

## 2021-03-18 PROCEDURE — C1713 ANCHOR/SCREW BN/BN,TIS/BN: HCPCS | Performed by: ORTHOPAEDIC SURGERY

## 2021-03-18 PROCEDURE — 25000003 PHARM REV CODE 250: Performed by: ANESTHESIOLOGY

## 2021-03-18 PROCEDURE — 94799 UNLISTED PULMONARY SVC/PX: CPT

## 2021-03-18 PROCEDURE — 36000711: Performed by: ORTHOPAEDIC SURGERY

## 2021-03-18 PROCEDURE — 37000008 HC ANESTHESIA 1ST 15 MINUTES: Performed by: ORTHOPAEDIC SURGERY

## 2021-03-18 PROCEDURE — 25000003 PHARM REV CODE 250: Performed by: ORTHOPAEDIC SURGERY

## 2021-03-18 PROCEDURE — 63600175 PHARM REV CODE 636 W HCPCS: Performed by: ANESTHESIOLOGY

## 2021-03-18 PROCEDURE — 25000003 PHARM REV CODE 250: Performed by: NURSE ANESTHETIST, CERTIFIED REGISTERED

## 2021-03-18 PROCEDURE — 71000039 HC RECOVERY, EACH ADD'L HOUR: Performed by: ORTHOPAEDIC SURGERY

## 2021-03-18 DEVICE — PATELLA COMPONENT 3 PEG 31X8MM: Type: IMPLANTABLE DEVICE | Site: KNEE | Status: FUNCTIONAL

## 2021-03-18 DEVICE — CEMENT REFOBACIN BCR 1X40: Type: IMPLANTABLE DEVICE | Site: KNEE | Status: FUNCTIONAL

## 2021-03-18 DEVICE — COMP FEM POST STBL 62.5MM R: Type: IMPLANTABLE DEVICE | Site: KNEE | Status: FUNCTIONAL

## 2021-03-18 DEVICE — INSERT TIBIAL BEARING 10 X 71/: Type: IMPLANTABLE DEVICE | Site: KNEE | Status: FUNCTIONAL

## 2021-03-18 DEVICE — TIBIAL TRAY CRUCIATE 71MM: Type: IMPLANTABLE DEVICE | Site: KNEE | Status: FUNCTIONAL

## 2021-03-18 RX ORDER — FAMOTIDINE 20 MG/1
20 TABLET, FILM COATED ORAL DAILY
Status: DISCONTINUED | OUTPATIENT
Start: 2021-03-18 | End: 2021-03-19 | Stop reason: HOSPADM

## 2021-03-18 RX ORDER — IBUPROFEN 200 MG
16 TABLET ORAL
Status: DISCONTINUED | OUTPATIENT
Start: 2021-03-18 | End: 2021-03-19 | Stop reason: HOSPADM

## 2021-03-18 RX ORDER — MORPHINE SULFATE 4 MG/ML
4 INJECTION, SOLUTION INTRAMUSCULAR; INTRAVENOUS
Status: DISCONTINUED | OUTPATIENT
Start: 2021-03-18 | End: 2021-03-19 | Stop reason: HOSPADM

## 2021-03-18 RX ORDER — METOCLOPRAMIDE HYDROCHLORIDE 5 MG/ML
5 INJECTION INTRAMUSCULAR; INTRAVENOUS EVERY 6 HOURS PRN
Status: DISCONTINUED | OUTPATIENT
Start: 2021-03-18 | End: 2021-03-19 | Stop reason: HOSPADM

## 2021-03-18 RX ORDER — LISINOPRIL 5 MG/1
10 TABLET ORAL 2 TIMES DAILY
Status: DISCONTINUED | OUTPATIENT
Start: 2021-03-19 | End: 2021-03-19 | Stop reason: HOSPADM

## 2021-03-18 RX ORDER — FENOFIBRATE 145 MG/1
145 TABLET, FILM COATED ORAL DAILY
Status: DISCONTINUED | OUTPATIENT
Start: 2021-03-18 | End: 2021-03-19 | Stop reason: HOSPADM

## 2021-03-18 RX ORDER — CITALOPRAM 10 MG/1
10 TABLET ORAL DAILY
Status: DISCONTINUED | OUTPATIENT
Start: 2021-03-18 | End: 2021-03-19 | Stop reason: HOSPADM

## 2021-03-18 RX ORDER — TRAZODONE HYDROCHLORIDE 100 MG/1
100 TABLET ORAL NIGHTLY
Status: DISCONTINUED | OUTPATIENT
Start: 2021-03-18 | End: 2021-03-19 | Stop reason: HOSPADM

## 2021-03-18 RX ORDER — FENTANYL CITRATE 50 UG/ML
100 INJECTION, SOLUTION INTRAMUSCULAR; INTRAVENOUS EVERY 5 MIN PRN
Status: COMPLETED | OUTPATIENT
Start: 2021-03-18 | End: 2021-03-18

## 2021-03-18 RX ORDER — ONDANSETRON 8 MG/1
8 TABLET, ORALLY DISINTEGRATING ORAL EVERY 8 HOURS PRN
Status: DISCONTINUED | OUTPATIENT
Start: 2021-03-18 | End: 2021-03-19 | Stop reason: HOSPADM

## 2021-03-18 RX ORDER — PREGABALIN 75 MG/1
75 CAPSULE ORAL ONCE
Status: COMPLETED | OUTPATIENT
Start: 2021-03-18 | End: 2021-03-18

## 2021-03-18 RX ORDER — LISINOPRIL 5 MG/1
10 TABLET ORAL 2 TIMES DAILY
Status: DISCONTINUED | OUTPATIENT
Start: 2021-03-18 | End: 2021-03-18

## 2021-03-18 RX ORDER — SODIUM CHLORIDE 0.9 % (FLUSH) 0.9 %
3 SYRINGE (ML) INJECTION
Status: DISCONTINUED | OUTPATIENT
Start: 2021-03-18 | End: 2021-03-19 | Stop reason: HOSPADM

## 2021-03-18 RX ORDER — PROPOFOL 10 MG/ML
VIAL (ML) INTRAVENOUS
Status: DISCONTINUED | OUTPATIENT
Start: 2021-03-18 | End: 2021-03-18

## 2021-03-18 RX ORDER — SODIUM CHLORIDE, SODIUM LACTATE, POTASSIUM CHLORIDE, CALCIUM CHLORIDE 600; 310; 30; 20 MG/100ML; MG/100ML; MG/100ML; MG/100ML
INJECTION, SOLUTION INTRAVENOUS CONTINUOUS
Status: DISCONTINUED | OUTPATIENT
Start: 2021-03-18 | End: 2021-03-18

## 2021-03-18 RX ORDER — ONDANSETRON 2 MG/ML
4 INJECTION INTRAMUSCULAR; INTRAVENOUS DAILY PRN
Status: DISCONTINUED | OUTPATIENT
Start: 2021-03-18 | End: 2021-03-18 | Stop reason: HOSPADM

## 2021-03-18 RX ORDER — INSULIN ASPART 100 [IU]/ML
0-5 INJECTION, SOLUTION INTRAVENOUS; SUBCUTANEOUS
Status: DISCONTINUED | OUTPATIENT
Start: 2021-03-18 | End: 2021-03-19 | Stop reason: HOSPADM

## 2021-03-18 RX ORDER — DEXTROSE MONOHYDRATE AND SODIUM CHLORIDE 5; .9 G/100ML; G/100ML
INJECTION, SOLUTION INTRAVENOUS CONTINUOUS
Status: DISCONTINUED | OUTPATIENT
Start: 2021-03-18 | End: 2021-03-18

## 2021-03-18 RX ORDER — HYDROCODONE BITARTRATE AND ACETAMINOPHEN 10; 325 MG/1; MG/1
1 TABLET ORAL EVERY 4 HOURS PRN
Status: DISCONTINUED | OUTPATIENT
Start: 2021-03-18 | End: 2021-03-19

## 2021-03-18 RX ORDER — HYDROCODONE BITARTRATE AND ACETAMINOPHEN 5; 325 MG/1; MG/1
1 TABLET ORAL EVERY 4 HOURS PRN
Status: DISCONTINUED | OUTPATIENT
Start: 2021-03-18 | End: 2021-03-19

## 2021-03-18 RX ORDER — ROPIVACAINE HYDROCHLORIDE 5 MG/ML
INJECTION, SOLUTION EPIDURAL; INFILTRATION; PERINEURAL
Status: COMPLETED | OUTPATIENT
Start: 2021-03-18 | End: 2021-03-18

## 2021-03-18 RX ORDER — SODIUM CHLORIDE 9 MG/ML
INJECTION, SOLUTION INTRAVENOUS CONTINUOUS
Status: DISCONTINUED | OUTPATIENT
Start: 2021-03-18 | End: 2021-03-18

## 2021-03-18 RX ORDER — LIDOCAINE HYDROCHLORIDE 10 MG/ML
0.5 INJECTION, SOLUTION EPIDURAL; INFILTRATION; INTRACAUDAL; PERINEURAL ONCE
Status: DISCONTINUED | OUTPATIENT
Start: 2021-03-18 | End: 2021-03-18 | Stop reason: HOSPADM

## 2021-03-18 RX ORDER — PROPOFOL 10 MG/ML
VIAL (ML) INTRAVENOUS CONTINUOUS PRN
Status: DISCONTINUED | OUTPATIENT
Start: 2021-03-18 | End: 2021-03-18

## 2021-03-18 RX ORDER — MEPERIDINE HYDROCHLORIDE 25 MG/ML
12.5 INJECTION INTRAMUSCULAR; INTRAVENOUS; SUBCUTANEOUS ONCE AS NEEDED
Status: DISCONTINUED | OUTPATIENT
Start: 2021-03-18 | End: 2021-03-18 | Stop reason: HOSPADM

## 2021-03-18 RX ORDER — CEFAZOLIN SODIUM 1 G/3ML
2 INJECTION, POWDER, FOR SOLUTION INTRAMUSCULAR; INTRAVENOUS
Status: COMPLETED | OUTPATIENT
Start: 2021-03-18 | End: 2021-03-18

## 2021-03-18 RX ORDER — MIDAZOLAM HYDROCHLORIDE 1 MG/ML
2 INJECTION INTRAMUSCULAR; INTRAVENOUS
Status: COMPLETED | OUTPATIENT
Start: 2021-03-18 | End: 2021-03-18

## 2021-03-18 RX ORDER — EPHEDRINE SULFATE 50 MG/ML
INJECTION, SOLUTION INTRAVENOUS
Status: DISCONTINUED | OUTPATIENT
Start: 2021-03-18 | End: 2021-03-18

## 2021-03-18 RX ORDER — LIDOCAINE HCL/PF 100 MG/5ML
SYRINGE (ML) INTRAVENOUS
Status: DISCONTINUED | OUTPATIENT
Start: 2021-03-18 | End: 2021-03-18

## 2021-03-18 RX ORDER — MUPIROCIN 20 MG/G
1 OINTMENT TOPICAL 2 TIMES DAILY
Status: DISCONTINUED | OUTPATIENT
Start: 2021-03-18 | End: 2021-03-19 | Stop reason: HOSPADM

## 2021-03-18 RX ORDER — ACETAMINOPHEN 500 MG
1000 TABLET ORAL
Status: COMPLETED | OUTPATIENT
Start: 2021-03-18 | End: 2021-03-18

## 2021-03-18 RX ORDER — OXYCODONE HYDROCHLORIDE 5 MG/1
5 TABLET ORAL
Status: DISCONTINUED | OUTPATIENT
Start: 2021-03-18 | End: 2021-03-18 | Stop reason: HOSPADM

## 2021-03-18 RX ORDER — GABAPENTIN 100 MG/1
100 CAPSULE ORAL NIGHTLY
Status: DISCONTINUED | OUTPATIENT
Start: 2021-03-18 | End: 2021-03-19 | Stop reason: HOSPADM

## 2021-03-18 RX ORDER — LORAZEPAM 0.5 MG/1
0.5 TABLET ORAL EVERY 12 HOURS PRN
Status: DISCONTINUED | OUTPATIENT
Start: 2021-03-18 | End: 2021-03-19 | Stop reason: HOSPADM

## 2021-03-18 RX ORDER — POLYETHYLENE GLYCOL 3350 17 G/17G
17 POWDER, FOR SOLUTION ORAL DAILY
Status: DISCONTINUED | OUTPATIENT
Start: 2021-03-18 | End: 2021-03-19 | Stop reason: HOSPADM

## 2021-03-18 RX ORDER — PHENYLEPHRINE HYDROCHLORIDE 10 MG/ML
INJECTION INTRAVENOUS
Status: DISCONTINUED | OUTPATIENT
Start: 2021-03-18 | End: 2021-03-18

## 2021-03-18 RX ORDER — MELATONIN 1 MG/ML
8 LIQUID (ML) ORAL NIGHTLY PRN
Status: DISCONTINUED | OUTPATIENT
Start: 2021-03-18 | End: 2021-03-19 | Stop reason: HOSPADM

## 2021-03-18 RX ORDER — CEFAZOLIN SODIUM 2 G/50ML
2 SOLUTION INTRAVENOUS
Status: COMPLETED | OUTPATIENT
Start: 2021-03-18 | End: 2021-03-19

## 2021-03-18 RX ORDER — GLUCAGON 1 MG
1 KIT INJECTION
Status: DISCONTINUED | OUTPATIENT
Start: 2021-03-18 | End: 2021-03-19 | Stop reason: HOSPADM

## 2021-03-18 RX ORDER — SODIUM CHLORIDE 0.9 % (FLUSH) 0.9 %
5 SYRINGE (ML) INJECTION
Status: DISCONTINUED | OUTPATIENT
Start: 2021-03-18 | End: 2021-03-19 | Stop reason: HOSPADM

## 2021-03-18 RX ORDER — IBUPROFEN 200 MG
24 TABLET ORAL
Status: DISCONTINUED | OUTPATIENT
Start: 2021-03-18 | End: 2021-03-19 | Stop reason: HOSPADM

## 2021-03-18 RX ORDER — NAPROXEN SODIUM 220 MG/1
81 TABLET, FILM COATED ORAL 2 TIMES DAILY
Status: DISCONTINUED | OUTPATIENT
Start: 2021-03-18 | End: 2021-03-18

## 2021-03-18 RX ORDER — ROSUVASTATIN CALCIUM 10 MG/1
20 TABLET, COATED ORAL NIGHTLY
Status: DISCONTINUED | OUTPATIENT
Start: 2021-03-18 | End: 2021-03-19 | Stop reason: HOSPADM

## 2021-03-18 RX ORDER — TRANEXAMIC ACID 100 MG/ML
INJECTION, SOLUTION INTRAVENOUS
Status: DISCONTINUED | OUTPATIENT
Start: 2021-03-18 | End: 2021-03-18 | Stop reason: HOSPADM

## 2021-03-18 RX ORDER — TRAMADOL HYDROCHLORIDE 50 MG/1
50 TABLET ORAL EVERY 4 HOURS PRN
Status: DISCONTINUED | OUTPATIENT
Start: 2021-03-18 | End: 2021-03-19 | Stop reason: HOSPADM

## 2021-03-18 RX ORDER — METFORMIN HYDROCHLORIDE 500 MG/1
500 TABLET, EXTENDED RELEASE ORAL 2 TIMES DAILY WITH MEALS
Status: DISCONTINUED | OUTPATIENT
Start: 2021-03-18 | End: 2021-03-19 | Stop reason: HOSPADM

## 2021-03-18 RX ORDER — HYDROMORPHONE HYDROCHLORIDE 2 MG/ML
0.4 INJECTION, SOLUTION INTRAMUSCULAR; INTRAVENOUS; SUBCUTANEOUS EVERY 5 MIN PRN
Status: DISCONTINUED | OUTPATIENT
Start: 2021-03-18 | End: 2021-03-18 | Stop reason: HOSPADM

## 2021-03-18 RX ORDER — SODIUM CHLORIDE 9 MG/ML
INJECTION, SOLUTION INTRAVENOUS CONTINUOUS
Status: DISCONTINUED | OUTPATIENT
Start: 2021-03-18 | End: 2021-03-19 | Stop reason: HOSPADM

## 2021-03-18 RX ADMIN — PROPOFOL 100 MCG/KG/MIN: 10 INJECTION, EMULSION INTRAVENOUS at 08:03

## 2021-03-18 RX ADMIN — PHENYLEPHRINE HYDROCHLORIDE 100 MCG: 10 INJECTION INTRAVENOUS at 10:03

## 2021-03-18 RX ADMIN — METFORMIN HYDROCHLORIDE 500 MG: 500 TABLET, EXTENDED RELEASE ORAL at 05:03

## 2021-03-18 RX ADMIN — PHENYLEPHRINE HYDROCHLORIDE 200 MCG: 10 INJECTION INTRAVENOUS at 09:03

## 2021-03-18 RX ADMIN — PHENYLEPHRINE HYDROCHLORIDE 200 MCG: 10 INJECTION INTRAVENOUS at 10:03

## 2021-03-18 RX ADMIN — OXYCODONE 5 MG: 5 TABLET ORAL at 10:03

## 2021-03-18 RX ADMIN — CEFAZOLIN SODIUM 2 G: 2 SOLUTION INTRAVENOUS at 05:03

## 2021-03-18 RX ADMIN — HYDROCODONE BITARTRATE AND ACETAMINOPHEN 1 TABLET: 10; 325 TABLET ORAL at 05:03

## 2021-03-18 RX ADMIN — CEFAZOLIN 2 G: 330 INJECTION, POWDER, FOR SOLUTION INTRAMUSCULAR; INTRAVENOUS at 08:03

## 2021-03-18 RX ADMIN — EPHEDRINE SULFATE 10 MG: 50 INJECTION INTRAVENOUS at 09:03

## 2021-03-18 RX ADMIN — HYDROCODONE BITARTRATE AND ACETAMINOPHEN 1 TABLET: 10; 325 TABLET ORAL at 01:03

## 2021-03-18 RX ADMIN — GABAPENTIN 100 MG: 100 CAPSULE ORAL at 08:03

## 2021-03-18 RX ADMIN — ROPIVACAINE HYDROCHLORIDE 3 ML: 5 INJECTION, SOLUTION EPIDURAL; INFILTRATION; PERINEURAL at 08:03

## 2021-03-18 RX ADMIN — EPHEDRINE SULFATE 10 MG: 50 INJECTION INTRAVENOUS at 08:03

## 2021-03-18 RX ADMIN — TRAZODONE HYDROCHLORIDE 100 MG: 100 TABLET ORAL at 08:03

## 2021-03-18 RX ADMIN — PHENYLEPHRINE HYDROCHLORIDE 100 MCG: 10 INJECTION INTRAVENOUS at 09:03

## 2021-03-18 RX ADMIN — FENTANYL CITRATE 50 MCG: 50 INJECTION, SOLUTION INTRAMUSCULAR; INTRAVENOUS at 08:03

## 2021-03-18 RX ADMIN — SODIUM CHLORIDE: 0.9 INJECTION, SOLUTION INTRAVENOUS at 01:03

## 2021-03-18 RX ADMIN — EPHEDRINE SULFATE 5 MG: 50 INJECTION INTRAVENOUS at 09:03

## 2021-03-18 RX ADMIN — RIVAROXABAN 10 MG: 10 TABLET, FILM COATED ORAL at 05:03

## 2021-03-18 RX ADMIN — CITALOPRAM HYDROBROMIDE 10 MG: 10 TABLET ORAL at 01:03

## 2021-03-18 RX ADMIN — PREGABALIN 75 MG: 75 CAPSULE ORAL at 07:03

## 2021-03-18 RX ADMIN — MIDAZOLAM HYDROCHLORIDE 1 MG: 1 INJECTION, SOLUTION INTRAMUSCULAR; INTRAVENOUS at 08:03

## 2021-03-18 RX ADMIN — ACETAMINOPHEN 1000 MG: 500 TABLET, FILM COATED ORAL at 07:03

## 2021-03-18 RX ADMIN — FAMOTIDINE 20 MG: 20 TABLET, FILM COATED ORAL at 01:03

## 2021-03-18 RX ADMIN — ROPIVACAINE HYDROCHLORIDE 20 ML: 5 INJECTION, SOLUTION EPIDURAL; INFILTRATION; PERINEURAL at 08:03

## 2021-03-18 RX ADMIN — HYDROCODONE BITARTRATE AND ACETAMINOPHEN 1 TABLET: 10; 325 TABLET ORAL at 09:03

## 2021-03-18 RX ADMIN — FENOFIBRATE 145 MG: 145 TABLET ORAL at 01:03

## 2021-03-18 RX ADMIN — LIDOCAINE HYDROCHLORIDE 50 MG: 20 INJECTION, SOLUTION INTRAVENOUS at 08:03

## 2021-03-18 RX ADMIN — MUPIROCIN 1 G: 20 OINTMENT TOPICAL at 01:03

## 2021-03-18 RX ADMIN — PROPOFOL 40 MG: 10 INJECTION, EMULSION INTRAVENOUS at 08:03

## 2021-03-18 RX ADMIN — ROSUVASTATIN CALCIUM 20 MG: 10 TABLET, FILM COATED ORAL at 08:03

## 2021-03-18 RX ADMIN — MUPIROCIN 1 G: 20 OINTMENT TOPICAL at 08:03

## 2021-03-19 VITALS
RESPIRATION RATE: 18 BRPM | DIASTOLIC BLOOD PRESSURE: 70 MMHG | HEIGHT: 60 IN | TEMPERATURE: 99 F | OXYGEN SATURATION: 94 % | SYSTOLIC BLOOD PRESSURE: 144 MMHG | WEIGHT: 172 LBS | HEART RATE: 70 BPM | BODY MASS INDEX: 33.77 KG/M2

## 2021-03-19 PROBLEM — M17.9 OA (OSTEOARTHRITIS) OF KNEE: Status: RESOLVED | Noted: 2021-03-18 | Resolved: 2021-03-19

## 2021-03-19 LAB
ANION GAP SERPL CALC-SCNC: 10 MMOL/L (ref 8–16)
BASOPHILS # BLD AUTO: 0.03 K/UL (ref 0–0.2)
BASOPHILS NFR BLD: 0.3 % (ref 0–1.9)
BUN SERPL-MCNC: 11 MG/DL (ref 8–23)
CALCIUM SERPL-MCNC: 9.1 MG/DL (ref 8.7–10.5)
CHLORIDE SERPL-SCNC: 102 MMOL/L (ref 95–110)
CO2 SERPL-SCNC: 22 MMOL/L (ref 23–29)
CREAT SERPL-MCNC: 0.7 MG/DL (ref 0.5–1.4)
DIFFERENTIAL METHOD: ABNORMAL
EOSINOPHIL # BLD AUTO: 0.1 K/UL (ref 0–0.5)
EOSINOPHIL NFR BLD: 1.1 % (ref 0–8)
ERYTHROCYTE [DISTWIDTH] IN BLOOD BY AUTOMATED COUNT: 13.5 % (ref 11.5–14.5)
EST. GFR  (AFRICAN AMERICAN): >60 ML/MIN/1.73 M^2
EST. GFR  (NON AFRICAN AMERICAN): >60 ML/MIN/1.73 M^2
GLUCOSE SERPL-MCNC: 212 MG/DL (ref 70–110)
HCT VFR BLD AUTO: 35.2 % (ref 37–48.5)
HGB BLD-MCNC: 11.4 G/DL (ref 12–16)
IMM GRANULOCYTES # BLD AUTO: 0.04 K/UL (ref 0–0.04)
IMM GRANULOCYTES NFR BLD AUTO: 0.4 % (ref 0–0.5)
LYMPHOCYTES # BLD AUTO: 1.1 K/UL (ref 1–4.8)
LYMPHOCYTES NFR BLD: 9.6 % (ref 18–48)
MCH RBC QN AUTO: 26.6 PG (ref 27–31)
MCHC RBC AUTO-ENTMCNC: 32.4 G/DL (ref 32–36)
MCV RBC AUTO: 82 FL (ref 82–98)
MONOCYTES # BLD AUTO: 1.1 K/UL (ref 0.3–1)
MONOCYTES NFR BLD: 10 % (ref 4–15)
NEUTROPHILS # BLD AUTO: 9 K/UL (ref 1.8–7.7)
NEUTROPHILS NFR BLD: 78.6 % (ref 38–73)
NRBC BLD-RTO: 0 /100 WBC
PLATELET # BLD AUTO: 280 K/UL (ref 150–350)
PMV BLD AUTO: 10.1 FL (ref 9.2–12.9)
POTASSIUM SERPL-SCNC: 4.1 MMOL/L (ref 3.5–5.1)
RBC # BLD AUTO: 4.28 M/UL (ref 4–5.4)
SODIUM SERPL-SCNC: 134 MMOL/L (ref 136–145)
WBC # BLD AUTO: 11.38 K/UL (ref 3.9–12.7)

## 2021-03-19 PROCEDURE — 36415 COLL VENOUS BLD VENIPUNCTURE: CPT | Performed by: ORTHOPAEDIC SURGERY

## 2021-03-19 PROCEDURE — 63600175 PHARM REV CODE 636 W HCPCS: Performed by: NURSE PRACTITIONER

## 2021-03-19 PROCEDURE — 85025 COMPLETE CBC W/AUTO DIFF WBC: CPT | Performed by: ORTHOPAEDIC SURGERY

## 2021-03-19 PROCEDURE — 94799 UNLISTED PULMONARY SVC/PX: CPT

## 2021-03-19 PROCEDURE — 25000003 PHARM REV CODE 250: Performed by: ORTHOPAEDIC SURGERY

## 2021-03-19 PROCEDURE — 97530 THERAPEUTIC ACTIVITIES: CPT | Mod: CQ

## 2021-03-19 PROCEDURE — 94761 N-INVAS EAR/PLS OXIMETRY MLT: CPT

## 2021-03-19 PROCEDURE — 25000003 PHARM REV CODE 250: Performed by: NURSE PRACTITIONER

## 2021-03-19 PROCEDURE — 80048 BASIC METABOLIC PNL TOTAL CA: CPT | Performed by: ORTHOPAEDIC SURGERY

## 2021-03-19 PROCEDURE — 63600175 PHARM REV CODE 636 W HCPCS: Performed by: ORTHOPAEDIC SURGERY

## 2021-03-19 PROCEDURE — 97116 GAIT TRAINING THERAPY: CPT | Mod: CQ

## 2021-03-19 PROCEDURE — 97165 OT EVAL LOW COMPLEX 30 MIN: CPT

## 2021-03-19 PROCEDURE — 97110 THERAPEUTIC EXERCISES: CPT | Mod: CQ

## 2021-03-19 RX ORDER — OXYCODONE AND ACETAMINOPHEN 10; 325 MG/1; MG/1
1 TABLET ORAL EVERY 4 HOURS PRN
Status: DISCONTINUED | OUTPATIENT
Start: 2021-03-19 | End: 2021-03-19 | Stop reason: HOSPADM

## 2021-03-19 RX ORDER — OXYCODONE AND ACETAMINOPHEN 10; 325 MG/1; MG/1
1 TABLET ORAL EVERY 4 HOURS PRN
Qty: 50 TABLET | Refills: 0 | Status: SHIPPED | OUTPATIENT
Start: 2021-03-19 | End: 2021-10-26

## 2021-03-19 RX ADMIN — CITALOPRAM HYDROBROMIDE 10 MG: 10 TABLET ORAL at 09:03

## 2021-03-19 RX ADMIN — METFORMIN HYDROCHLORIDE 500 MG: 500 TABLET, EXTENDED RELEASE ORAL at 09:03

## 2021-03-19 RX ADMIN — CEFAZOLIN SODIUM 2 G: 2 SOLUTION INTRAVENOUS at 01:03

## 2021-03-19 RX ADMIN — HYDROCODONE BITARTRATE AND ACETAMINOPHEN 1 TABLET: 10; 325 TABLET ORAL at 01:03

## 2021-03-19 RX ADMIN — LISINOPRIL 10 MG: 5 TABLET ORAL at 09:03

## 2021-03-19 RX ADMIN — OXYCODONE AND ACETAMINOPHEN 1 TABLET: 10; 325 TABLET ORAL at 12:03

## 2021-03-19 RX ADMIN — HYDROCODONE BITARTRATE AND ACETAMINOPHEN 1 TABLET: 10; 325 TABLET ORAL at 05:03

## 2021-03-19 RX ADMIN — FENOFIBRATE 145 MG: 145 TABLET ORAL at 09:03

## 2021-03-19 RX ADMIN — FAMOTIDINE 20 MG: 20 TABLET, FILM COATED ORAL at 09:03

## 2021-03-19 RX ADMIN — INSULIN ASPART 2 UNITS: 100 INJECTION, SOLUTION INTRAVENOUS; SUBCUTANEOUS at 12:03

## 2021-03-19 RX ADMIN — ONDANSETRON 8 MG: 8 TABLET, ORALLY DISINTEGRATING ORAL at 05:03

## 2021-03-19 RX ADMIN — HYDROCODONE BITARTRATE AND ACETAMINOPHEN 1 TABLET: 10; 325 TABLET ORAL at 09:03

## 2021-03-30 DIAGNOSIS — Z79.4 TYPE 2 DIABETES MELLITUS WITH HYPERGLYCEMIA, WITH LONG-TERM CURRENT USE OF INSULIN: ICD-10-CM

## 2021-03-30 DIAGNOSIS — E11.65 TYPE 2 DIABETES MELLITUS WITH HYPERGLYCEMIA, WITH LONG-TERM CURRENT USE OF INSULIN: ICD-10-CM

## 2021-03-30 RX ORDER — INSULIN ASPART 100 [IU]/ML
INJECTION, SOLUTION INTRAVENOUS; SUBCUTANEOUS
Qty: 30 ML | Refills: 6 | Status: SHIPPED | OUTPATIENT
Start: 2021-03-30 | End: 2021-04-16 | Stop reason: SDUPTHER

## 2021-04-08 ENCOUNTER — OFFICE VISIT (OUTPATIENT)
Dept: DIABETES | Facility: CLINIC | Age: 71
End: 2021-04-08
Payer: COMMERCIAL

## 2021-04-08 VITALS
BODY MASS INDEX: 28.79 KG/M2 | SYSTOLIC BLOOD PRESSURE: 123 MMHG | OXYGEN SATURATION: 98 % | WEIGHT: 146.63 LBS | DIASTOLIC BLOOD PRESSURE: 58 MMHG | HEIGHT: 60 IN | HEART RATE: 68 BPM

## 2021-04-08 DIAGNOSIS — E11.65 TYPE 2 DIABETES MELLITUS WITH HYPERGLYCEMIA, WITH LONG-TERM CURRENT USE OF INSULIN: Primary | ICD-10-CM

## 2021-04-08 DIAGNOSIS — E78.5 DYSLIPIDEMIA, GOAL LDL BELOW 100: ICD-10-CM

## 2021-04-08 DIAGNOSIS — E66.3 OVERWEIGHT (BMI 25.0-29.9): ICD-10-CM

## 2021-04-08 DIAGNOSIS — I10 ESSENTIAL HYPERTENSION: ICD-10-CM

## 2021-04-08 DIAGNOSIS — Z79.4 TYPE 2 DIABETES MELLITUS WITH HYPERGLYCEMIA, WITH LONG-TERM CURRENT USE OF INSULIN: Primary | ICD-10-CM

## 2021-04-08 DIAGNOSIS — Z71.9 HEALTH EDUCATION/COUNSELING: ICD-10-CM

## 2021-04-08 DIAGNOSIS — Z96.651 STATUS POST RIGHT KNEE REPLACEMENT: ICD-10-CM

## 2021-04-08 DIAGNOSIS — Z91.199 NONCOMPLIANCE WITH DIABETES TREATMENT: ICD-10-CM

## 2021-04-08 PROCEDURE — 1159F PR MEDICATION LIST DOCUMENTED IN MEDICAL RECORD: ICD-10-PCS | Mod: S$GLB,,, | Performed by: NURSE PRACTITIONER

## 2021-04-08 PROCEDURE — 99999 PR PBB SHADOW E&M-EST. PATIENT-LVL V: CPT | Mod: PBBFAC,,, | Performed by: NURSE PRACTITIONER

## 2021-04-08 PROCEDURE — 95251 PR GLUCOSE MONITOR, 72 HOUR, PHYS INTERP: ICD-10-PCS | Mod: S$GLB,,, | Performed by: NURSE PRACTITIONER

## 2021-04-08 PROCEDURE — 1101F PT FALLS ASSESS-DOCD LE1/YR: CPT | Mod: CPTII,S$GLB,, | Performed by: NURSE PRACTITIONER

## 2021-04-08 PROCEDURE — 1157F PR ADVANCE CARE PLAN OR EQUIV PRESENT IN MEDICAL RECORD: ICD-10-PCS | Mod: S$GLB,,, | Performed by: NURSE PRACTITIONER

## 2021-04-08 PROCEDURE — 3288F PR FALLS RISK ASSESSMENT DOCUMENTED: ICD-10-PCS | Mod: CPTII,S$GLB,, | Performed by: NURSE PRACTITIONER

## 2021-04-08 PROCEDURE — 99214 PR OFFICE/OUTPT VISIT, EST, LEVL IV, 30-39 MIN: ICD-10-PCS | Mod: S$GLB,,, | Performed by: NURSE PRACTITIONER

## 2021-04-08 PROCEDURE — 1157F ADVNC CARE PLAN IN RCRD: CPT | Mod: S$GLB,,, | Performed by: NURSE PRACTITIONER

## 2021-04-08 PROCEDURE — 1101F PR PT FALLS ASSESS DOC 0-1 FALLS W/OUT INJ PAST YR: ICD-10-PCS | Mod: CPTII,S$GLB,, | Performed by: NURSE PRACTITIONER

## 2021-04-08 PROCEDURE — 95251 CONT GLUC MNTR ANALYSIS I&R: CPT | Mod: S$GLB,,, | Performed by: NURSE PRACTITIONER

## 2021-04-08 PROCEDURE — 3288F FALL RISK ASSESSMENT DOCD: CPT | Mod: CPTII,S$GLB,, | Performed by: NURSE PRACTITIONER

## 2021-04-08 PROCEDURE — 3008F BODY MASS INDEX DOCD: CPT | Mod: CPTII,S$GLB,, | Performed by: NURSE PRACTITIONER

## 2021-04-08 PROCEDURE — 1125F PR PAIN SEVERITY QUANTIFIED, PAIN PRESENT: ICD-10-PCS | Mod: S$GLB,,, | Performed by: NURSE PRACTITIONER

## 2021-04-08 PROCEDURE — 3051F HG A1C>EQUAL 7.0%<8.0%: CPT | Mod: CPTII,S$GLB,, | Performed by: NURSE PRACTITIONER

## 2021-04-08 PROCEDURE — 3008F PR BODY MASS INDEX (BMI) DOCUMENTED: ICD-10-PCS | Mod: CPTII,S$GLB,, | Performed by: NURSE PRACTITIONER

## 2021-04-08 PROCEDURE — 3051F PR MOST RECENT HEMOGLOBIN A1C LEVEL 7.0 - < 8.0%: ICD-10-PCS | Mod: CPTII,S$GLB,, | Performed by: NURSE PRACTITIONER

## 2021-04-08 PROCEDURE — 1159F MED LIST DOCD IN RCRD: CPT | Mod: S$GLB,,, | Performed by: NURSE PRACTITIONER

## 2021-04-08 PROCEDURE — 99999 PR PBB SHADOW E&M-EST. PATIENT-LVL V: ICD-10-PCS | Mod: PBBFAC,,, | Performed by: NURSE PRACTITIONER

## 2021-04-08 PROCEDURE — 99214 OFFICE O/P EST MOD 30 MIN: CPT | Mod: S$GLB,,, | Performed by: NURSE PRACTITIONER

## 2021-04-08 PROCEDURE — 1125F AMNT PAIN NOTED PAIN PRSNT: CPT | Mod: S$GLB,,, | Performed by: NURSE PRACTITIONER

## 2021-04-11 ENCOUNTER — EXTERNAL HOME HEALTH (OUTPATIENT)
Dept: HOME HEALTH SERVICES | Facility: HOSPITAL | Age: 71
End: 2021-04-11

## 2021-04-16 DIAGNOSIS — E11.65 TYPE 2 DIABETES MELLITUS WITH HYPERGLYCEMIA, WITH LONG-TERM CURRENT USE OF INSULIN: ICD-10-CM

## 2021-04-16 DIAGNOSIS — Z79.4 TYPE 2 DIABETES MELLITUS WITH HYPERGLYCEMIA, WITH LONG-TERM CURRENT USE OF INSULIN: ICD-10-CM

## 2021-04-16 RX ORDER — INSULIN ASPART 100 [IU]/ML
INJECTION, SOLUTION INTRAVENOUS; SUBCUTANEOUS
Qty: 20 ML | Refills: 6 | Status: SHIPPED | OUTPATIENT
Start: 2021-04-16 | End: 2021-05-25 | Stop reason: SDUPTHER

## 2021-04-16 RX ORDER — SUB-Q INSULIN DEVICE, 40 UNIT
1 EACH MISCELLANEOUS DAILY
Qty: 30 DEVICE | Refills: 3 | Status: SHIPPED | OUTPATIENT
Start: 2021-04-16 | End: 2021-05-25 | Stop reason: SDUPTHER

## 2021-05-10 PROBLEM — E78.2 MIXED HYPERLIPIDEMIA: Chronic | Status: ACTIVE | Noted: 2021-05-10

## 2021-05-25 ENCOUNTER — OFFICE VISIT (OUTPATIENT)
Dept: DIABETES | Facility: CLINIC | Age: 71
End: 2021-05-25
Payer: COMMERCIAL

## 2021-05-25 VITALS
WEIGHT: 167.88 LBS | BODY MASS INDEX: 32.96 KG/M2 | SYSTOLIC BLOOD PRESSURE: 132 MMHG | HEIGHT: 60 IN | OXYGEN SATURATION: 96 % | DIASTOLIC BLOOD PRESSURE: 82 MMHG | HEART RATE: 92 BPM | TEMPERATURE: 98 F

## 2021-05-25 DIAGNOSIS — E78.5 DYSLIPIDEMIA, GOAL LDL BELOW 100: ICD-10-CM

## 2021-05-25 DIAGNOSIS — I10 ESSENTIAL HYPERTENSION: ICD-10-CM

## 2021-05-25 DIAGNOSIS — E66.3 OVERWEIGHT (BMI 25.0-29.9): ICD-10-CM

## 2021-05-25 DIAGNOSIS — Z79.4 TYPE 2 DIABETES MELLITUS WITH HYPERGLYCEMIA, WITH LONG-TERM CURRENT USE OF INSULIN: Primary | ICD-10-CM

## 2021-05-25 DIAGNOSIS — Z71.9 HEALTH EDUCATION/COUNSELING: ICD-10-CM

## 2021-05-25 DIAGNOSIS — E11.65 TYPE 2 DIABETES MELLITUS WITH HYPERGLYCEMIA, WITH LONG-TERM CURRENT USE OF INSULIN: Primary | ICD-10-CM

## 2021-05-25 PROCEDURE — 99215 OFFICE O/P EST HI 40 MIN: CPT | Mod: PBBFAC,PN | Performed by: NURSE PRACTITIONER

## 2021-05-25 PROCEDURE — 99214 OFFICE O/P EST MOD 30 MIN: CPT | Mod: S$PBB,,, | Performed by: NURSE PRACTITIONER

## 2021-05-25 PROCEDURE — 99214 PR OFFICE/OUTPT VISIT, EST, LEVL IV, 30-39 MIN: ICD-10-PCS | Mod: S$PBB,,, | Performed by: NURSE PRACTITIONER

## 2021-05-25 PROCEDURE — 95251 CONT GLUC MNTR ANALYSIS I&R: CPT | Mod: ,,, | Performed by: NURSE PRACTITIONER

## 2021-05-25 PROCEDURE — 99999 PR PBB SHADOW E&M-EST. PATIENT-LVL V: ICD-10-PCS | Mod: PBBFAC,,, | Performed by: NURSE PRACTITIONER

## 2021-05-25 PROCEDURE — 95251 PR GLUCOSE MONITOR, 72 HOUR, PHYS INTERP: ICD-10-PCS | Mod: ,,, | Performed by: NURSE PRACTITIONER

## 2021-05-25 PROCEDURE — 99999 PR PBB SHADOW E&M-EST. PATIENT-LVL V: CPT | Mod: PBBFAC,,, | Performed by: NURSE PRACTITIONER

## 2021-05-25 RX ORDER — INSULIN ASPART 100 [IU]/ML
INJECTION, SOLUTION INTRAVENOUS; SUBCUTANEOUS
Qty: 20 ML | Refills: 6 | Status: SHIPPED | OUTPATIENT
Start: 2021-05-25 | End: 2021-09-13 | Stop reason: SDUPTHER

## 2021-05-25 RX ORDER — SUB-Q INSULIN DEVICE, 40 UNIT
1 EACH MISCELLANEOUS DAILY
Qty: 30 DEVICE | Refills: 6 | Status: SHIPPED | OUTPATIENT
Start: 2021-05-25 | End: 2021-09-13 | Stop reason: SDUPTHER

## 2021-05-25 RX ORDER — SUB-Q INSULIN DEVICE, 40 UNIT
1 EACH MISCELLANEOUS DAILY
Qty: 30 DEVICE | Refills: 3 | Status: SHIPPED | OUTPATIENT
Start: 2021-05-25 | End: 2021-05-25 | Stop reason: SDUPTHER

## 2021-05-28 ENCOUNTER — TELEPHONE (OUTPATIENT)
Dept: DIABETES | Facility: CLINIC | Age: 71
End: 2021-05-28

## 2021-06-22 ENCOUNTER — TELEPHONE (OUTPATIENT)
Dept: DIABETES | Facility: CLINIC | Age: 71
End: 2021-06-22

## 2021-07-02 ENCOUNTER — TELEPHONE (OUTPATIENT)
Dept: PULMONOLOGY | Facility: CLINIC | Age: 71
End: 2021-07-02

## 2021-07-29 ENCOUNTER — OFFICE VISIT (OUTPATIENT)
Dept: UROGYNECOLOGY | Facility: CLINIC | Age: 71
End: 2021-07-29
Payer: MEDICARE

## 2021-07-29 ENCOUNTER — TELEPHONE (OUTPATIENT)
Dept: UROGYNECOLOGY | Facility: CLINIC | Age: 71
End: 2021-07-29

## 2021-07-29 VITALS
DIASTOLIC BLOOD PRESSURE: 64 MMHG | BODY MASS INDEX: 33.58 KG/M2 | SYSTOLIC BLOOD PRESSURE: 130 MMHG | WEIGHT: 171.06 LBS | HEIGHT: 60 IN

## 2021-07-29 DIAGNOSIS — N95.2 VAGINAL ATROPHY: Primary | ICD-10-CM

## 2021-07-29 DIAGNOSIS — Q52.4: ICD-10-CM

## 2021-07-29 PROCEDURE — 99203 OFFICE O/P NEW LOW 30 MIN: CPT | Mod: 25,S$PBB,, | Performed by: OBSTETRICS & GYNECOLOGY

## 2021-07-29 PROCEDURE — 51701 INSERT BLADDER CATHETER: CPT | Mod: S$PBB,,, | Performed by: OBSTETRICS & GYNECOLOGY

## 2021-07-29 PROCEDURE — 51701 INSERT BLADDER CATHETER: CPT | Mod: PBBFAC | Performed by: OBSTETRICS & GYNECOLOGY

## 2021-07-29 PROCEDURE — 51701 PR INSERTION OF NON-INDWELLING BLADDER CATHETERIZATION FOR RESIDUAL UR: ICD-10-PCS | Mod: S$PBB,,, | Performed by: OBSTETRICS & GYNECOLOGY

## 2021-07-29 PROCEDURE — 99999 PR PBB SHADOW E&M-EST. PATIENT-LVL IV: ICD-10-PCS | Mod: PBBFAC,,, | Performed by: OBSTETRICS & GYNECOLOGY

## 2021-07-29 PROCEDURE — 99203 PR OFFICE/OUTPT VISIT, NEW, LEVL III, 30-44 MIN: ICD-10-PCS | Mod: 25,S$PBB,, | Performed by: OBSTETRICS & GYNECOLOGY

## 2021-07-29 PROCEDURE — 99999 PR PBB SHADOW E&M-EST. PATIENT-LVL IV: CPT | Mod: PBBFAC,,, | Performed by: OBSTETRICS & GYNECOLOGY

## 2021-07-29 PROCEDURE — 99214 OFFICE O/P EST MOD 30 MIN: CPT | Mod: PBBFAC,25 | Performed by: OBSTETRICS & GYNECOLOGY

## 2021-07-29 RX ORDER — ESTRADIOL 0.1 MG/G
1 CREAM VAGINAL
Qty: 1 TUBE | Refills: 11 | Status: SHIPPED | OUTPATIENT
Start: 2021-07-30 | End: 2022-11-21 | Stop reason: CLARIF

## 2021-07-29 RX ORDER — MIRABEGRON 25 MG/1
25 TABLET, FILM COATED, EXTENDED RELEASE ORAL DAILY
Qty: 30 TABLET | Refills: 11 | Status: SHIPPED | OUTPATIENT
Start: 2021-07-29 | End: 2022-08-02 | Stop reason: SDUPTHER

## 2021-08-03 PROBLEM — R27.9 LACK OF COORDINATION: Status: ACTIVE | Noted: 2021-08-03

## 2021-08-03 PROBLEM — M62.81 MUSCLE WEAKNESS: Status: ACTIVE | Noted: 2021-08-03

## 2021-08-05 ENCOUNTER — CLINICAL SUPPORT (OUTPATIENT)
Dept: REHABILITATION | Facility: OTHER | Age: 71
End: 2021-08-05
Attending: OBSTETRICS & GYNECOLOGY
Payer: MEDICARE

## 2021-08-05 DIAGNOSIS — N95.2 VAGINAL ATROPHY: ICD-10-CM

## 2021-08-05 DIAGNOSIS — R27.9 LACK OF COORDINATION: ICD-10-CM

## 2021-08-05 DIAGNOSIS — M62.81 MUSCLE WEAKNESS: ICD-10-CM

## 2021-08-05 PROCEDURE — 97161 PT EVAL LOW COMPLEX 20 MIN: CPT

## 2021-08-10 ENCOUNTER — CLINICAL SUPPORT (OUTPATIENT)
Dept: REHABILITATION | Facility: OTHER | Age: 71
End: 2021-08-10
Attending: PHYSICAL MEDICINE & REHABILITATION
Payer: MEDICARE

## 2021-08-10 DIAGNOSIS — R27.9 LACK OF COORDINATION: ICD-10-CM

## 2021-08-10 DIAGNOSIS — M62.81 MUSCLE WEAKNESS: Primary | ICD-10-CM

## 2021-08-10 PROCEDURE — 97530 THERAPEUTIC ACTIVITIES: CPT

## 2021-08-10 PROCEDURE — 97140 MANUAL THERAPY 1/> REGIONS: CPT

## 2021-08-25 ENCOUNTER — CLINICAL SUPPORT (OUTPATIENT)
Dept: REHABILITATION | Facility: OTHER | Age: 71
End: 2021-08-25
Attending: PHYSICAL MEDICINE & REHABILITATION
Payer: MEDICARE

## 2021-08-25 DIAGNOSIS — R27.9 LACK OF COORDINATION: ICD-10-CM

## 2021-08-25 DIAGNOSIS — M62.81 MUSCLE WEAKNESS: Primary | ICD-10-CM

## 2021-08-25 PROCEDURE — 97112 NEUROMUSCULAR REEDUCATION: CPT

## 2021-08-25 PROCEDURE — 97110 THERAPEUTIC EXERCISES: CPT

## 2021-08-25 PROCEDURE — 97140 MANUAL THERAPY 1/> REGIONS: CPT

## 2021-08-31 ENCOUNTER — HOSPITAL ENCOUNTER (EMERGENCY)
Facility: HOSPITAL | Age: 71
Discharge: HOME | End: 2021-08-31
Attending: EMERGENCY MEDICINE
Payer: MEDICARE

## 2021-08-31 VITALS
OXYGEN SATURATION: 97 % | WEIGHT: 172 LBS | HEART RATE: 60 BPM | SYSTOLIC BLOOD PRESSURE: 155 MMHG | HEIGHT: 60 IN | BODY MASS INDEX: 33.77 KG/M2 | TEMPERATURE: 98 F | RESPIRATION RATE: 16 BRPM | DIASTOLIC BLOOD PRESSURE: 70 MMHG

## 2021-08-31 DIAGNOSIS — H16.002 ULCER OF LEFT CORNEA: Primary | ICD-10-CM

## 2021-08-31 PROCEDURE — 87102 FUNGUS ISOLATION CULTURE: CPT | Performed by: STUDENT IN AN ORGANIZED HEALTH CARE EDUCATION/TRAINING PROGRAM

## 2021-08-31 PROCEDURE — 87070 CULTURE OTHR SPECIMN AEROBIC: CPT | Performed by: STUDENT IN AN ORGANIZED HEALTH CARE EDUCATION/TRAINING PROGRAM

## 2021-08-31 PROCEDURE — 87206 SMEAR FLUORESCENT/ACID STAI: CPT | Performed by: STUDENT IN AN ORGANIZED HEALTH CARE EDUCATION/TRAINING PROGRAM

## 2021-08-31 PROCEDURE — 25000003 PHARM REV CODE 250: Performed by: STUDENT IN AN ORGANIZED HEALTH CARE EDUCATION/TRAINING PROGRAM

## 2021-08-31 PROCEDURE — 87116 MYCOBACTERIA CULTURE: CPT | Performed by: STUDENT IN AN ORGANIZED HEALTH CARE EDUCATION/TRAINING PROGRAM

## 2021-08-31 PROCEDURE — 99284 PR EMERGENCY DEPT VISIT,LEVEL IV: ICD-10-PCS | Mod: ,,, | Performed by: EMERGENCY MEDICINE

## 2021-08-31 PROCEDURE — 87075 CULTR BACTERIA EXCEPT BLOOD: CPT | Performed by: STUDENT IN AN ORGANIZED HEALTH CARE EDUCATION/TRAINING PROGRAM

## 2021-08-31 PROCEDURE — 99283 EMERGENCY DEPT VISIT LOW MDM: CPT | Mod: 25

## 2021-08-31 PROCEDURE — 99284 EMERGENCY DEPT VISIT MOD MDM: CPT | Mod: ,,, | Performed by: EMERGENCY MEDICINE

## 2021-08-31 RX ADMIN — TOBRAMYCIN 1 DROP: 3 SOLUTION/ DROPS OPHTHALMIC at 04:08

## 2021-09-01 ENCOUNTER — HOSPITAL ENCOUNTER (EMERGENCY)
Facility: HOSPITAL | Age: 71
Discharge: HOME | End: 2021-09-01
Attending: EMERGENCY MEDICINE
Payer: MEDICARE

## 2021-09-01 VITALS
SYSTOLIC BLOOD PRESSURE: 178 MMHG | HEART RATE: 60 BPM | DIASTOLIC BLOOD PRESSURE: 74 MMHG | OXYGEN SATURATION: 97 % | RESPIRATION RATE: 16 BRPM | TEMPERATURE: 99 F

## 2021-09-01 DIAGNOSIS — S05.02XA ABRASION OF LEFT CORNEA, INITIAL ENCOUNTER: Primary | ICD-10-CM

## 2021-09-01 PROCEDURE — 99284 EMERGENCY DEPT VISIT MOD MDM: CPT | Mod: ,,, | Performed by: EMERGENCY MEDICINE

## 2021-09-01 PROCEDURE — 99284 PR EMERGENCY DEPT VISIT,LEVEL IV: ICD-10-PCS | Mod: ,,, | Performed by: EMERGENCY MEDICINE

## 2021-09-01 PROCEDURE — 99283 EMERGENCY DEPT VISIT LOW MDM: CPT

## 2021-09-01 RX ORDER — FAMCICLOVIR 250 MG/1
250 TABLET ORAL EVERY 8 HOURS
Qty: 21 TABLET | Refills: 0 | Status: SHIPPED | OUTPATIENT
Start: 2021-09-01 | End: 2021-09-08

## 2021-09-03 LAB — BACTERIA SPEC AEROBE CULT: NO GROWTH

## 2021-09-04 ENCOUNTER — PATIENT OUTREACH (OUTPATIENT)
Dept: EMERGENCY MEDICINE | Facility: HOSPITAL | Age: 71
End: 2021-09-04

## 2021-09-07 LAB — BACTERIA SPEC ANAEROBE CULT: NORMAL

## 2021-09-09 ENCOUNTER — TELEPHONE (OUTPATIENT)
Dept: OPHTHALMOLOGY | Facility: CLINIC | Age: 71
End: 2021-09-09

## 2021-09-13 DIAGNOSIS — Z79.4 TYPE 2 DIABETES MELLITUS WITH HYPERGLYCEMIA, WITH LONG-TERM CURRENT USE OF INSULIN: ICD-10-CM

## 2021-09-13 DIAGNOSIS — E11.65 TYPE 2 DIABETES MELLITUS WITH HYPERGLYCEMIA, WITH LONG-TERM CURRENT USE OF INSULIN: ICD-10-CM

## 2021-09-13 RX ORDER — SUB-Q INSULIN DEVICE, 40 UNIT
1 EACH MISCELLANEOUS DAILY
Qty: 30 DEVICE | Refills: 6 | Status: SHIPPED | OUTPATIENT
Start: 2021-09-13 | End: 2021-12-20 | Stop reason: SDUPTHER

## 2021-09-13 RX ORDER — INSULIN ASPART 100 [IU]/ML
INJECTION, SOLUTION INTRAVENOUS; SUBCUTANEOUS
Qty: 20 ML | Refills: 6 | Status: SHIPPED | OUTPATIENT
Start: 2021-09-13 | End: 2021-09-14

## 2021-09-14 ENCOUNTER — TELEPHONE (OUTPATIENT)
Dept: DIABETES | Facility: CLINIC | Age: 71
End: 2021-09-14

## 2021-09-14 DIAGNOSIS — Z79.4 TYPE 2 DIABETES MELLITUS WITH HYPERGLYCEMIA, WITH LONG-TERM CURRENT USE OF INSULIN: Primary | ICD-10-CM

## 2021-09-14 DIAGNOSIS — E11.65 TYPE 2 DIABETES MELLITUS WITH HYPERGLYCEMIA, WITH LONG-TERM CURRENT USE OF INSULIN: Primary | ICD-10-CM

## 2021-09-14 RX ORDER — INSULIN ASPART 100 [IU]/ML
INJECTION, SOLUTION INTRAVENOUS; SUBCUTANEOUS
Qty: 2 VIAL | Refills: 6 | Status: SHIPPED | OUTPATIENT
Start: 2021-09-14 | End: 2022-03-16 | Stop reason: SDUPTHER

## 2021-09-15 ENCOUNTER — OFFICE VISIT (OUTPATIENT)
Dept: OPHTHALMOLOGY | Facility: CLINIC | Age: 71
End: 2021-09-15
Payer: MEDICARE

## 2021-09-15 DIAGNOSIS — H16.002 ULCER OF LEFT CORNEA: Primary | ICD-10-CM

## 2021-09-15 PROCEDURE — 99999 PR PBB SHADOW E&M-EST. PATIENT-LVL IV: CPT | Mod: PBBFAC,,, | Performed by: OPHTHALMOLOGY

## 2021-09-15 PROCEDURE — 92004 COMPRE OPH EXAM NEW PT 1/>: CPT | Mod: S$PBB,,, | Performed by: OPHTHALMOLOGY

## 2021-09-15 PROCEDURE — 99214 OFFICE O/P EST MOD 30 MIN: CPT | Mod: PBBFAC | Performed by: OPHTHALMOLOGY

## 2021-09-15 PROCEDURE — 99999 PR PBB SHADOW E&M-EST. PATIENT-LVL IV: ICD-10-PCS | Mod: PBBFAC,,, | Performed by: OPHTHALMOLOGY

## 2021-09-15 PROCEDURE — 92004 PR EYE EXAM, NEW PATIENT,COMPREHESV: ICD-10-PCS | Mod: S$PBB,,, | Performed by: OPHTHALMOLOGY

## 2021-09-28 ENCOUNTER — OFFICE VISIT (OUTPATIENT)
Dept: DIABETES | Facility: CLINIC | Age: 71
End: 2021-09-28
Payer: MEDICARE

## 2021-09-28 VITALS
BODY MASS INDEX: 34.61 KG/M2 | WEIGHT: 176.31 LBS | HEIGHT: 60 IN | HEART RATE: 69 BPM | SYSTOLIC BLOOD PRESSURE: 112 MMHG | OXYGEN SATURATION: 79 % | TEMPERATURE: 98 F | DIASTOLIC BLOOD PRESSURE: 78 MMHG

## 2021-09-28 DIAGNOSIS — I10 ESSENTIAL HYPERTENSION: ICD-10-CM

## 2021-09-28 DIAGNOSIS — E11.65 TYPE 2 DIABETES MELLITUS WITH HYPERGLYCEMIA, WITH LONG-TERM CURRENT USE OF INSULIN: Primary | ICD-10-CM

## 2021-09-28 DIAGNOSIS — Z79.4 TYPE 2 DIABETES MELLITUS WITH HYPERGLYCEMIA, WITH LONG-TERM CURRENT USE OF INSULIN: Primary | ICD-10-CM

## 2021-09-28 DIAGNOSIS — E66.09 CLASS 1 OBESITY DUE TO EXCESS CALORIES WITH SERIOUS COMORBIDITY AND BODY MASS INDEX (BMI) OF 34.0 TO 34.9 IN ADULT: ICD-10-CM

## 2021-09-28 DIAGNOSIS — E78.5 DYSLIPIDEMIA, GOAL LDL BELOW 100: ICD-10-CM

## 2021-09-28 PROCEDURE — 99999 PR PBB SHADOW E&M-EST. PATIENT-LVL V: CPT | Mod: PBBFAC,,, | Performed by: NURSE PRACTITIONER

## 2021-09-28 PROCEDURE — 95251 CONT GLUC MNTR ANALYSIS I&R: CPT | Mod: ,,, | Performed by: NURSE PRACTITIONER

## 2021-09-28 PROCEDURE — 95251 PR GLUCOSE MONITOR, 72 HOUR, PHYS INTERP: ICD-10-PCS | Mod: ,,, | Performed by: NURSE PRACTITIONER

## 2021-09-28 PROCEDURE — 99215 OFFICE O/P EST HI 40 MIN: CPT | Mod: PBBFAC,PN | Performed by: NURSE PRACTITIONER

## 2021-09-28 PROCEDURE — 99999 PR PBB SHADOW E&M-EST. PATIENT-LVL V: ICD-10-PCS | Mod: PBBFAC,,, | Performed by: NURSE PRACTITIONER

## 2021-09-28 PROCEDURE — 99214 OFFICE O/P EST MOD 30 MIN: CPT | Mod: S$PBB,,, | Performed by: NURSE PRACTITIONER

## 2021-09-28 PROCEDURE — 99214 PR OFFICE/OUTPT VISIT, EST, LEVL IV, 30-39 MIN: ICD-10-PCS | Mod: S$PBB,,, | Performed by: NURSE PRACTITIONER

## 2021-09-28 RX ORDER — SEMAGLUTIDE 1.34 MG/ML
INJECTION, SOLUTION SUBCUTANEOUS
Qty: 1 PEN | Refills: 4 | Status: SHIPPED | OUTPATIENT
Start: 2021-09-28 | End: 2022-02-04

## 2021-09-29 LAB — FUNGUS SPEC CULT: NORMAL

## 2021-09-30 ENCOUNTER — TELEPHONE (OUTPATIENT)
Dept: DIABETES | Facility: CLINIC | Age: 71
End: 2021-09-30

## 2021-10-01 ENCOUNTER — TELEPHONE (OUTPATIENT)
Dept: DIABETES | Facility: CLINIC | Age: 71
End: 2021-10-01

## 2021-10-13 ENCOUNTER — OFFICE VISIT (OUTPATIENT)
Dept: OPHTHALMOLOGY | Facility: CLINIC | Age: 71
End: 2021-10-13
Payer: MEDICARE

## 2021-10-13 DIAGNOSIS — H25.13 NUCLEAR SCLEROSIS, BILATERAL: ICD-10-CM

## 2021-10-13 DIAGNOSIS — H16.002 ULCER OF LEFT CORNEA: Primary | ICD-10-CM

## 2021-10-13 PROCEDURE — 92014 COMPRE OPH EXAM EST PT 1/>: CPT | Mod: S$PBB,,, | Performed by: OPHTHALMOLOGY

## 2021-10-13 PROCEDURE — 99214 OFFICE O/P EST MOD 30 MIN: CPT | Mod: PBBFAC | Performed by: OPHTHALMOLOGY

## 2021-10-13 PROCEDURE — 99999 PR PBB SHADOW E&M-EST. PATIENT-LVL IV: ICD-10-PCS | Mod: PBBFAC,,, | Performed by: OPHTHALMOLOGY

## 2021-10-13 PROCEDURE — 92014 PR EYE EXAM, EST PATIENT,COMPREHESV: ICD-10-PCS | Mod: S$PBB,,, | Performed by: OPHTHALMOLOGY

## 2021-10-13 PROCEDURE — 99999 PR PBB SHADOW E&M-EST. PATIENT-LVL IV: CPT | Mod: PBBFAC,,, | Performed by: OPHTHALMOLOGY

## 2021-10-19 LAB
ACID FAST MOD KINY STN SPEC: NORMAL
MYCOBACTERIUM SPEC QL CULT: NORMAL

## 2021-10-27 ENCOUNTER — OFFICE VISIT (OUTPATIENT)
Dept: OPHTHALMOLOGY | Facility: CLINIC | Age: 71
End: 2021-10-27
Payer: MEDICARE

## 2021-10-27 DIAGNOSIS — H25.13 NUCLEAR SCLEROSIS, BILATERAL: Primary | ICD-10-CM

## 2021-10-27 PROCEDURE — 92014 COMPRE OPH EXAM EST PT 1/>: CPT | Mod: S$PBB,,, | Performed by: OPHTHALMOLOGY

## 2021-10-27 PROCEDURE — 99999 PR PBB SHADOW E&M-EST. PATIENT-LVL II: ICD-10-PCS | Mod: PBBFAC,,, | Performed by: OPHTHALMOLOGY

## 2021-10-27 PROCEDURE — 92014 PR EYE EXAM, EST PATIENT,COMPREHESV: ICD-10-PCS | Mod: S$PBB,,, | Performed by: OPHTHALMOLOGY

## 2021-10-27 PROCEDURE — 99212 OFFICE O/P EST SF 10 MIN: CPT | Mod: PBBFAC | Performed by: OPHTHALMOLOGY

## 2021-10-27 PROCEDURE — 99999 PR PBB SHADOW E&M-EST. PATIENT-LVL II: CPT | Mod: PBBFAC,,, | Performed by: OPHTHALMOLOGY

## 2021-12-03 ENCOUNTER — TELEPHONE (OUTPATIENT)
Dept: DIABETES | Facility: CLINIC | Age: 71
End: 2021-12-03
Payer: MEDICARE

## 2021-12-20 ENCOUNTER — TELEPHONE (OUTPATIENT)
Dept: DIABETES | Facility: CLINIC | Age: 71
End: 2021-12-20
Payer: MEDICARE

## 2021-12-20 DIAGNOSIS — Z79.4 TYPE 2 DIABETES MELLITUS WITH HYPERGLYCEMIA, WITH LONG-TERM CURRENT USE OF INSULIN: ICD-10-CM

## 2021-12-20 DIAGNOSIS — E11.65 TYPE 2 DIABETES MELLITUS WITH HYPERGLYCEMIA, WITH LONG-TERM CURRENT USE OF INSULIN: ICD-10-CM

## 2021-12-20 RX ORDER — SUB-Q INSULIN DEVICE, 40 UNIT
1 EACH MISCELLANEOUS DAILY
Qty: 30 EACH | Refills: 6 | Status: SHIPPED | OUTPATIENT
Start: 2021-12-20 | End: 2022-03-16 | Stop reason: SDUPTHER

## 2022-01-25 ENCOUNTER — TELEPHONE (OUTPATIENT)
Dept: DIABETES | Facility: CLINIC | Age: 72
End: 2022-01-25
Payer: MEDICARE

## 2022-01-25 NOTE — TELEPHONE ENCOUNTER
----- Message from Sushma Plaza sent at 1/25/2022  9:59 AM CST -----  Contact: [phone] Skip  Patient would like to schedule an appointment. Please call and advise.

## 2022-03-16 ENCOUNTER — OFFICE VISIT (OUTPATIENT)
Dept: DIABETES | Facility: CLINIC | Age: 72
End: 2022-03-16
Payer: MEDICARE

## 2022-03-16 ENCOUNTER — TELEPHONE (OUTPATIENT)
Dept: DIABETES | Facility: CLINIC | Age: 72
End: 2022-03-16
Payer: MEDICARE

## 2022-03-16 VITALS
TEMPERATURE: 98 F | SYSTOLIC BLOOD PRESSURE: 112 MMHG | WEIGHT: 173.88 LBS | HEART RATE: 73 BPM | HEIGHT: 61 IN | BODY MASS INDEX: 32.83 KG/M2 | OXYGEN SATURATION: 98 % | DIASTOLIC BLOOD PRESSURE: 64 MMHG

## 2022-03-16 DIAGNOSIS — E11.65 TYPE 2 DIABETES MELLITUS WITH HYPERGLYCEMIA, WITH LONG-TERM CURRENT USE OF INSULIN: Primary | ICD-10-CM

## 2022-03-16 DIAGNOSIS — E78.5 DYSLIPIDEMIA, GOAL LDL BELOW 100: ICD-10-CM

## 2022-03-16 DIAGNOSIS — Z79.4 TYPE 2 DIABETES MELLITUS WITH HYPERGLYCEMIA, WITH LONG-TERM CURRENT USE OF INSULIN: Primary | ICD-10-CM

## 2022-03-16 DIAGNOSIS — Z71.9 HEALTH EDUCATION/COUNSELING: ICD-10-CM

## 2022-03-16 DIAGNOSIS — I10 PRIMARY HYPERTENSION: ICD-10-CM

## 2022-03-16 DIAGNOSIS — E66.09 CLASS 1 OBESITY DUE TO EXCESS CALORIES WITH SERIOUS COMORBIDITY AND BODY MASS INDEX (BMI) OF 32.0 TO 32.9 IN ADULT: ICD-10-CM

## 2022-03-16 PROCEDURE — 99214 OFFICE O/P EST MOD 30 MIN: CPT | Mod: S$PBB,,, | Performed by: NURSE PRACTITIONER

## 2022-03-16 PROCEDURE — 99999 PR PBB SHADOW E&M-EST. PATIENT-LVL V: CPT | Mod: PBBFAC,,, | Performed by: NURSE PRACTITIONER

## 2022-03-16 PROCEDURE — 99215 OFFICE O/P EST HI 40 MIN: CPT | Mod: PBBFAC,PN | Performed by: NURSE PRACTITIONER

## 2022-03-16 PROCEDURE — 99214 PR OFFICE/OUTPT VISIT, EST, LEVL IV, 30-39 MIN: ICD-10-PCS | Mod: S$PBB,,, | Performed by: NURSE PRACTITIONER

## 2022-03-16 PROCEDURE — 99999 PR PBB SHADOW E&M-EST. PATIENT-LVL V: ICD-10-PCS | Mod: PBBFAC,,, | Performed by: NURSE PRACTITIONER

## 2022-03-16 RX ORDER — SEMAGLUTIDE 1.34 MG/ML
0.5 INJECTION, SOLUTION SUBCUTANEOUS
Qty: 1 PEN | Refills: 6 | Status: SHIPPED | OUTPATIENT
Start: 2022-03-16 | End: 2022-05-18 | Stop reason: SDUPTHER

## 2022-03-16 RX ORDER — INSULIN LISPRO 100 [IU]/ML
INJECTION, SOLUTION INTRAVENOUS; SUBCUTANEOUS
Qty: 20 ML | Refills: 6 | Status: SHIPPED | OUTPATIENT
Start: 2022-03-16 | End: 2022-05-18

## 2022-03-16 RX ORDER — METFORMIN HYDROCHLORIDE 500 MG/1
500 TABLET, EXTENDED RELEASE ORAL 2 TIMES DAILY WITH MEALS
Qty: 180 TABLET | Refills: 2 | Status: SHIPPED | OUTPATIENT
Start: 2022-03-16 | End: 2022-05-18 | Stop reason: SDUPTHER

## 2022-03-16 RX ORDER — SUB-Q INSULIN DEVICE, 40 UNIT
1 EACH MISCELLANEOUS DAILY
Qty: 30 EACH | Refills: 6 | Status: SHIPPED | OUTPATIENT
Start: 2022-03-16 | End: 2022-11-02

## 2022-03-16 NOTE — TELEPHONE ENCOUNTER
Annabel stated that pt 90 day supply of dexcom supplies was shipped out yesterday 3/15 , provided pt with UPS tracking number to track dexcom supplies

## 2022-03-16 NOTE — ASSESSMENT & PLAN NOTE
Controlled based on last A1c level   Unfortunately unable to download her Dexcom today  A1c has improved with Ozempic   Dexcom is helping with self awareness  Discussed trying to place the Dexcom in an area where she is not laying on it at night as that is likely causing falsely low blood sugar readings.        -- Medication Changes:   Continue Metfomrin  mg BID   Continue Ozempic 0.5 mg weekly  Continue V Go 20 with NovoLog insulin--2-4 clicks t.i.d. a.c.  1-2 clicks with snacking as needed.   Discussed that she needs to give her clicks BEFORE MEALS not during or AFTER   Change VGo q 24 hours.         -- Reviewed goals of therapy are to get the best control we can without hypoglycemia.  -- Reviewed patient's current insulin regimen. Clarified proper insulin dose and timing in relation to meals, etc. Insulin injection sites and proper rotation instructed.    -- Advised frequent self blood glucose monitoring.  Patient encouraged to document glucose results and bring them to every clinic visit. Continue to use Dexcom  -- Hypoglycemia precautions discussed. Instructed on precautions before driving.    -- Call for Bg repeatedly < 70 or > 180.   -- Close adherence to lifestyle changes recommended.   -- Periodic follow ups for eye evaluations, foot care and dental care suggested.

## 2022-03-16 NOTE — ASSESSMENT & PLAN NOTE
Body mass index is 32.86 kg/m².  Increases insulin resistance.   Discussed DM diet and exercise.

## 2022-03-16 NOTE — PROGRESS NOTES
CC:   Chief Complaint   Patient presents with    Diabetes Mellitus       HPI: Skip Curran is a 71 y.o. female presents for a follow up visit today for the management of T2DM.     She was diagnosed with Type 2 diabetes around 1717-1987 on routine lab work. She was initially started on Metformin and then later started insulin therapy around 2010.     Family hx of diabetes: father, and maternal grandmother   Hospitalized for diabetes: denies     No personal or personal of pancreatic cancer or pancreatitis.   + sister with thyroid cancer - unknown the kind of thyroid cancer       Our last visit was in September of 2021  At that visit we continued her metformin, continued her V Go 20 as she was no longer having hypoglycemia on the V Go 20  We started her on Ozempic weekly  She is wearing her Dexcom--however she is using her phone as the  and did not have the Clarity application on the phone.  Due to this we are unable to download her readings today on her dexcom   She reports that her blood sugar readings have been mostly in the 100s to 130s except for when she cheats  Her A1c in January is down to 6.9%  She has lost some weight on the Ozempic  She does report that sometimes she will get nocturnal hypoglycemia--and the readings will not be accurate as she will perform a fingerstick and her blood sugars are in the 130s to 160s  It sounds like she is laying on the Dexcom sometimes at night and it is causing falsely low blood sugar readings      DIABETES COMPLICATIONS: none      Diabetes Management Status    ASA:  No-- allergy to ASA- Hives     Statin: Taking- Crestor 20 mg nightly   ACE/ARB: Taking- Lisinopril 10 mg daily     Screening or Prevention Patient's value Goal Complete/Controlled?   HgA1C Testing and Control   Lab Results   Component Value Date    HGBA1C 6.9 (H) 01/13/2022      Annually/Less than 8% Yes   Lipid profile : 01/13/2022 Annually Yes   LDL control Lab Results   Component Value Date     LDLCALC 56.6 (L) 01/13/2022    Annually/Less than 100 mg/dl  Yes   Nephropathy screening Lab Results   Component Value Date    LABMICR 13.0 05/27/2021     Lab Results   Component Value Date    PROTEINUA Negative 03/10/2021    Annually No   Blood pressure BP Readings from Last 1 Encounters:   03/16/22 112/64    Less than 140/90 Yes   Dilated retinal exam : 10/27/2021- external- across the [place] [place] - [facility]- [facility]- number [phone] Annually No   Foot exam   : 03/16/2022 Annually No       CURRENT A1C:    Hemoglobin A1C   Date Value Ref Range Status   01/13/2022 6.9 (H) 4.0 - 5.6 % Final     Comment:     ADA Screening Guidelines:  5.7-6.4%  Consistent with prediabetes  >or=6.5%  Consistent with diabetes    High levels of fetal hemoglobin interfere with the HbA1C  assay. Heterozygous hemoglobin variants (HbS, HgC, etc)do  not significantly interfere with this assay.   However, presence of multiple variants may affect accuracy.     10/01/2021 7.3 (H) 4.0 - 5.6 % Final     Comment:     ADA Screening Guidelines:  5.7-6.4%  Consistent with prediabetes  >or=6.5%  Consistent with diabetes    High levels of fetal hemoglobin interfere with the HbA1C  assay. Heterozygous hemoglobin variants (HbS, HgC, etc)do  not significantly interfere with this assay.   However, presence of multiple variants may affect accuracy.     05/27/2021 7.2 (H) 4.0 - 5.6 % Final     Comment:     ADA Screening Guidelines:  5.7-6.4%  Consistent with prediabetes  >or=6.5%  Consistent with diabetes    High levels of fetal hemoglobin interfere with the HbA1C  assay. Heterozygous hemoglobin variants (HbS, HgC, etc)do  not significantly interfere with this assay.   However, presence of multiple variants may affect accuracy.         GOAL A1C: 6.5%-7% without hypoglycemia     DM MEDICATIONS USED IN THE PAST:  Metformin   Lantus, Janumet  Novolog 70/30  VGo  Dexcom   Metformin XR     CURRENT DIABETES MEDICATIONS: Metformin XR  500 mg BID and  VGo20 with Novolog 2-3 clicks with breakfast , 4 clicks with lunch, 1-2 clicks with dinner,  No clicks for snacking   Giving clicks before meals.   Ozempic 0.5 mg weekly on Thursdays   Insulin:  VGo  Missed doses: denies   Wearing VGO to abdomen.   Changing VGo every 24 hours.  No skin irritation     VGo from [employer].       BLOOD GLUCOSE MONITORING:   Sensor type: Dexcom G6   Site change:   q10 days    BG readings mostly 100-130's     Dexcom supplies from [employer]     Unable to download dexcom today   She is using the phone as her  but has a new phone since last visit and did not have clarity downloaded.   We are unable to get a report on her dexcom         HYPOGLYCEMIA:  None reported   Getting falsely low readings in the early morning- but she is laying on her dexcom while she is sleeping.   Discussed alternative sites  Glucagon kit: denies - lives alone   Medic alert bracelet: denies   Drinks OJ       MEALS: eating 3 meals per day   BF: toast or bagel and coffee- with sweet and low and SF creamer   Lunch: sister in law cooks every day   Dinner: home cooked as well.   Snack: occ on peanut, occ candy   Drinks: water or diet drinks        CURRENT EXERCISE:  No    Review of Systems  Review of Systems   Constitutional: Negative for appetite change and fatigue.   HENT: Negative for trouble swallowing.    Eyes: Negative for visual disturbance.   Respiratory: Negative for shortness of breath.    Cardiovascular: Negative for chest pain.   Gastrointestinal: Negative for nausea.   Endocrine: Negative for polydipsia, polyphagia and polyuria.   Genitourinary:        No Nocturia    Musculoskeletal: Positive for arthralgias, back pain and joint swelling.        Right knee swelling    Skin: Negative for wound.   Neurological: Negative for numbness.       Physical Exam   Physical Exam  Vitals and nursing note reviewed.   Constitutional:       Appearance: She is well-developed. She is obese.   HENT:       Head: Normocephalic and atraumatic.      Right Ear: External ear normal.      Left Ear: External ear normal.      Nose: Nose normal.   Neck:      Thyroid: No thyromegaly.      Trachea: No tracheal deviation.   Cardiovascular:      Rate and Rhythm: Normal rate and regular rhythm.      Heart sounds: No murmur heard.  Pulmonary:      Effort: Pulmonary effort is normal. No respiratory distress.      Breath sounds: Normal breath sounds.   Abdominal:      Palpations: Abdomen is soft.      Tenderness: There is no abdominal tenderness.      Hernia: No hernia is present.   Musculoskeletal:      Cervical back: Normal range of motion and neck supple.   Skin:     General: Skin is warm and dry.      Capillary Refill: Capillary refill takes less than 2 seconds.      Findings: No rash.      Comments: VGo and Dexcom sites are normal appearing. No lipo hypertropthy or atrophy     Neurological:      Mental Status: She is alert and oriented to person, place, and time.      Cranial Nerves: No cranial nerve deficit.   Psychiatric:         Behavior: Behavior normal.         Judgment: Judgment normal.         FOOT EXAMINATION: Appropriate footwear     Protective Sensation (w/ 10 gram monofilament):  Right: Intact  Left: Intact    Visual Inspection:  Normal -  Bilateral and Nails Intact - without Evidence of Foot Deformity- Bilateral    Pedal Pulses:   Right: Present  Left: Present    Posterior tibialis:   Right:Present  Left: Present          Lab Results   Component Value Date    TSH 0.907 01/13/2022         Type 2 diabetes mellitus with hyperglycemia, with long-term current use of insulin  Controlled based on last A1c level   Unfortunately unable to download her Dexcom today  A1c has improved with Ozempic   Dexcom is helping with self awareness  Discussed trying to place the Dexcom in an area where she is not laying on it at night as that is likely causing falsely low blood sugar readings.        -- Medication Changes:   Continue  Metfomrin  mg BID   Continue Ozempic 0.5 mg weekly  Continue V Go 20 with NovoLog insulin--2-4 clicks t.i.d. a.c.  1-2 clicks with snacking as needed.   Discussed that she needs to give her clicks BEFORE MEALS not during or AFTER   Change VGo q 24 hours.         -- Reviewed goals of therapy are to get the best control we can without hypoglycemia.  -- Reviewed patient's current insulin regimen. Clarified proper insulin dose and timing in relation to meals, etc. Insulin injection sites and proper rotation instructed.    -- Advised frequent self blood glucose monitoring.  Patient encouraged to document glucose results and bring them to every clinic visit. Continue to use Dexcom  -- Hypoglycemia precautions discussed. Instructed on precautions before driving.    -- Call for Bg repeatedly < 70 or > 180.   -- Close adherence to lifestyle changes recommended.   -- Periodic follow ups for eye evaluations, foot care and dental care suggested.      HTN (hypertension)  BP goal is < 140/90.   Tolerating ACEi  Controlled   Blood pressure goals discussed with patient      Dyslipidemia, goal LDL below 100  On statin per ADA recommendations  LDL goal < 100. LDL at goal. LFTs WNL. Continue statin.         Class 1 obesity due to excess calories with serious comorbidity and body mass index (BMI) of 32.0 to 32.9 in adult  Body mass index is 32.86 kg/m².  Increases insulin resistance.   Discussed DM diet and exercise.         Spent 25 minutes with patient with > 50% time spent in counseling on the dexcom, supply issues, VGo clicks, Ozempic   dexcom applications on her phone fixed so she is now sharing data       Follow up in about 6 months (around 9/16/2022).-   Call Annabel to see what is going on with her dexcom -- she is having issues getting her supplies   F/u with me in 6 months         No orders of the defined types were placed in this encounter.      Recommendations were discussed with the patient in detail  The patient  verbalized understanding and agrees with the plan outlined as above.     This note was partly generated with [employer] voice recognition software. I apologize for any possible typographical errors.

## 2022-03-22 ENCOUNTER — OFFICE VISIT (OUTPATIENT)
Dept: OPHTHALMOLOGY | Facility: CLINIC | Age: 72
End: 2022-03-22
Payer: MEDICARE

## 2022-03-22 DIAGNOSIS — H02.59 LAXITY OF EYELID: ICD-10-CM

## 2022-03-22 DIAGNOSIS — H02.413 MECHANICAL PTOSIS, ACQUIRED, BILATERAL: Primary | ICD-10-CM

## 2022-03-22 DIAGNOSIS — H25.13 NUCLEAR SCLEROSIS, BILATERAL: ICD-10-CM

## 2022-03-22 DIAGNOSIS — H02.824 CYST OF LEFT UPPER EYELID: ICD-10-CM

## 2022-03-22 DIAGNOSIS — H02.423 ACQUIRED MYOGENIC PTOSIS OF EYELID, BILATERAL: ICD-10-CM

## 2022-03-22 DIAGNOSIS — H02.835 DERMATOCHALASIS OF BOTH LOWER EYELIDS: ICD-10-CM

## 2022-03-22 DIAGNOSIS — H02.832 DERMATOCHALASIS OF BOTH LOWER EYELIDS: ICD-10-CM

## 2022-03-22 PROCEDURE — 92285 EXTERNAL PHOTOGRAPHY - OU - BOTH EYES: ICD-10-PCS | Mod: 26,S$PBB,, | Performed by: OPHTHALMOLOGY

## 2022-03-22 PROCEDURE — 99999 PR PBB SHADOW E&M-EST. PATIENT-LVL IV: CPT | Mod: PBBFAC,,, | Performed by: OPHTHALMOLOGY

## 2022-03-22 PROCEDURE — 99999 PR PBB SHADOW E&M-EST. PATIENT-LVL IV: ICD-10-PCS | Mod: PBBFAC,,, | Performed by: OPHTHALMOLOGY

## 2022-03-22 PROCEDURE — 92083 EXTENDED VISUAL FIELD XM: CPT | Mod: PBBFAC | Performed by: OPHTHALMOLOGY

## 2022-03-22 PROCEDURE — 99214 OFFICE O/P EST MOD 30 MIN: CPT | Mod: PBBFAC | Performed by: OPHTHALMOLOGY

## 2022-03-22 PROCEDURE — 92285 EXTERNAL OCULAR PHOTOGRAPHY: CPT | Mod: PBBFAC | Performed by: OPHTHALMOLOGY

## 2022-03-22 PROCEDURE — 99214 PR OFFICE/OUTPT VISIT, EST, LEVL IV, 30-39 MIN: ICD-10-PCS | Mod: S$PBB,,, | Performed by: OPHTHALMOLOGY

## 2022-03-22 PROCEDURE — 99214 OFFICE O/P EST MOD 30 MIN: CPT | Mod: S$PBB,,, | Performed by: OPHTHALMOLOGY

## 2022-03-22 PROCEDURE — 92083 GOLDMANN PERIMETRY - OU - EXTENDED - BOTH EYES: ICD-10-PCS | Mod: 26,S$PBB,, | Performed by: OPHTHALMOLOGY

## 2022-03-22 NOTE — PROGRESS NOTES
WILD Curran is a/an 71 y.o. female here for ptosis.  Referred by: Jenelle  How long have eyelid(s) been droopy? 1 yr   Any h/o wearing hard CLs? no  Do eyelids feel heavy? yes  Does the height of the eyelid(s) fluctuate throughout the day? yes  Do eyelid(s) interfere with daily activities such as driving, reading,   working? yes  Spontaneous orbital pain? no  Orbital pain w eye movement? no  Red eyes? no  Red eyelids? no  Eyelid swelling? No          Last edited by Jeanette Morales on 3/22/2022  1:38 PM. (History)        Assessment /Plan     For exam results, see Encounter Report.    Mechanical ptosis, acquired, bilateral  -     External Photography - OU - Both Eyes  -     GOLDMANN PERIMETRY - OU - EXTENDED - BOTH EYES    Acquired myogenic ptosis of eyelid, bilateral    Nuclear sclerosis, bilateral    Dermatochalasis of both lower eyelids    Laxity of eyelid    Cyst of left upper eyelid      The patient is a pleasant 71-year-old female here for evaluation of bilateral upper eyelid heaviness.  This has been progressive and affects activities of daily living.  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  She denies any prior eyelid surgery or eyelid trauma.  She does have incipient cataracts bilaterally.  The patient uses Clear eyes in the mornings.  She currently uses soft contact lenses in changes them daily.    On exam, the patient has chronic frontalis use.  She has bilateral mild brow elevation.  She has bilateral upper eyelid mechanical ptosis with skin lash touch.  She has bilateral upper eyelid acquired myogenic ptosis slightly worse on the left than the right.  She has bilateral lower eyelid dermatochalasis with herniation of orbital fat.  She has bilateral lower eyelid moderate lateral canthal laxity.    Pt. With significant improvement of superior visual fields with lids and brows taped vs. untaped.    These findings were discussed with the patient.    Recommend bilateral upper  eyelid  mmcr 8/10 and bilateral upper eyelid blepharoplasty.     Discussed option of bilateral lower eyelid subtractive cosmetic blepharoplasty with pinch and canthoplasty.  Patient will decide dependent upon the cost the procedure.    Informed consent obtained after extensive risks/benefits/alternatives were discussed with the patient including but not limited to pain, bleeding, infection, ocular injury, loss of the eye, asymmetry, need for revision in future, scarring.  Alternatives such as waiting were discussed.  All questions were answered.      Hold ASA, NSAIDS, and fish oil 5 to 7 days prior to procedure.    Return for surgery

## 2022-04-01 ENCOUNTER — TELEPHONE (OUTPATIENT)
Dept: OPHTHALMOLOGY | Facility: CLINIC | Age: 72
End: 2022-04-01
Payer: MEDICARE

## 2022-04-01 DIAGNOSIS — H02.423 ACQUIRED MYOGENIC PTOSIS OF EYELID, BILATERAL: Primary | ICD-10-CM

## 2022-04-01 DIAGNOSIS — H02.413 MECHANICAL PTOSIS, ACQUIRED, BILATERAL: ICD-10-CM

## 2022-05-19 PROBLEM — R27.9 LACK OF COORDINATION: Status: RESOLVED | Noted: 2021-08-03 | Resolved: 2022-05-19
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
## 2022-05-27 PROBLEM — T14.90XA TRAUMA: Status: ACTIVE | Noted: 2022-05-27

## 2022-06-10 ENCOUNTER — TELEPHONE (OUTPATIENT)
Dept: DIABETES | Facility: CLINIC | Age: 72
End: 2022-06-10
Payer: MEDICARE

## 2022-06-10 DIAGNOSIS — E11.65 TYPE 2 DIABETES MELLITUS WITH HYPERGLYCEMIA, WITH LONG-TERM CURRENT USE OF INSULIN: Primary | ICD-10-CM

## 2022-06-10 DIAGNOSIS — Z79.4 TYPE 2 DIABETES MELLITUS WITH HYPERGLYCEMIA, WITH LONG-TERM CURRENT USE OF INSULIN: Primary | ICD-10-CM

## 2022-06-10 RX ORDER — INSULIN LISPRO 100 [IU]/ML
INJECTION, SOLUTION INTRAVENOUS; SUBCUTANEOUS
Qty: 30 ML | Refills: 6 | Status: SHIPPED | OUTPATIENT
Start: 2022-06-10 | End: 2022-08-02 | Stop reason: SDUPTHER

## 2022-06-10 NOTE — TELEPHONE ENCOUNTER
----- Message from Luz Maria Sanchez MA sent at 6/10/2022 11:08 AM CDT -----  Regarding: HUMALOG  Pt stating that the vials she receives do not fill her Vgo 20s and is asking for one more vial to be sent in to [facility].  ----- Message -----  From: Marguerite Ramachandran  Sent: 6/10/2022  11:01 AM CDT  To: Alexis Morrissey Staff    Skip Curran calling regarding Refills  (message) for #insulin lispro (HUMALOG U-100 INSULIN) 100 unit/mL injection . call back [phone]      [facility] [place] - [address] [place] [address]  Phone: [phone] Fax: [phone]

## 2022-06-10 NOTE — TELEPHONE ENCOUNTER
----- Message from Marguerite Ramachandran sent at 6/10/2022 10:58 AM CDT -----  Skip Curran calling regarding Refills  (message) for #insulin lispro (HUMALOG U-100 INSULIN) 100 unit/mL injection . call back [phone]      [facility] [place] - [address]  [address] [place] [address]  Phone: [phone] Fax: [phone]

## 2022-06-10 NOTE — TELEPHONE ENCOUNTER
Called pt to let her know that provider sent over 3 vials of Humalog , pt stated she would go  medication today

## 2022-06-19 ENCOUNTER — ANESTHESIA EVENT (OUTPATIENT)
Dept: SURGERY | Facility: HOSPITAL | Age: 72
End: 2022-06-19
Payer: MEDICARE

## 2022-06-20 PROBLEM — H02.423 ACQUIRED MYOGENIC PTOSIS OF EYELID, BILATERAL: Status: ACTIVE | Noted: 2022-06-20

## 2022-06-20 NOTE — H&P
Pre-operative History and Physical  Ophthalmology Service    CC: bilateral upper eyelid heaviness    HPI:   Patient is a 71 y.o. female presents with bilateral acquired mechanical ptosis, bilateral acquired myogenic ptosis, dermatochalasis of both lower eyelids, bilateral lower eyelid lateral canthal laxity requiring surgery as noted in the most recent ophthalmology progress note.    Past Medical History:   Diagnosis Date    Allergy     Arthritis     Asthma     Back injuries     two broken bones that healed on own    Cataract     Depression     Diabetes mellitus     Diabetes mellitus type I     GERD (gastroesophageal reflux disease)     Hypercholesteremia     Hypertension     Insomnia     Slow to wake up after anesthesia 2002    Orthoscope of knee       Past Surgical History:   Procedure Laterality Date    anal fissure surgery      BREAST BIOPSY Right     benign    BRONCHOSCOPY N/A 10/22/2019    Procedure: bronch floro , noon;  Surgeon: Pepe Andersen MD;  [facility];  Service: Endoscopy;  Laterality: N/A;    COLONOSCOPY  09/18/2020    COLONOSCOPY N/A 9/18/2020    Procedure: COLONOSCOPY;  Surgeon: Magno Gant MD;  [facility];  Service: Colon and Rectal;  Laterality: N/A;    HYSTERECTOMY      KNEE ARTHROSCOPY W/ DEBRIDEMENT Left     x3    LIPOMA RESECTION      fatty tumor between breast    SHOULDER SURGERY Left 2/11/2016    Dr Burris     TONSILLECTOMY      TOTAL KNEE ARTHROPLASTY Right 3/18/2021    Procedure: ARTHROPLASTY, KNEE, TOTAL;  Surgeon: Rashi De La O MD;  Location: [facility];  Service: Orthopedics;  Laterality: Right;  ADDUCTOR BLOCK    WRIST RECONSTRUCTION Right     tendon       Family History   Problem Relation Age of Onset    Arthritis Mother     Hypertension Mother     Arthritis Father     Diabetes Father     Hypertension Father     Breast cancer Sister     Cancer Sister         breast cancer     Breast cancer Paternal Aunt        Social History      Socioeconomic History    Marital status:    Occupational History    Occupation: [employer]     Employer: [employer]   Tobacco Use    Smoking status: Never Smoker    Smokeless tobacco: Never Used   Substance and Sexual Activity    Alcohol use: Yes     Alcohol/week: 1.0 standard drink     Types: 1 Shots of liquor per week     Comment: socially    Drug use: No    Sexual activity: Never     Social Determinants of Health     Financial Resource Strain: Medium Risk    Difficulty of Paying Living Expenses: Somewhat hard   Food Insecurity: Food Insecurity Present    Worried About Running Out of Food in the Last Year: Sometimes true    Ran Out of Food in the Last Year: Never true   Transportation Needs: No Transportation Needs    Lack of Transportation (Medical): No    Lack of Transportation (Non-Medical): No   Physical Activity: Inactive    Days of Exercise per Week: 0 days    Minutes of Exercise per Session: 0 min   Stress: No Stress Concern Present    Feeling of Stress : Only a little   Social Connections: Unknown    Frequency of Communication with Friends and Family: More than three times a week    Frequency of Social Gatherings with Friends and Family: More than three times a week   Housing Stability: Low Risk     Unable to Pay for Housing in the Last Year: No    Number of Places Lived in the Last Year: 1    Unstable Housing in the Last Year: No       No current facility-administered medications for this encounter.     Current Outpatient Medications   Medication Sig Dispense Refill    albuterol (ACCUNEB) 0.63 mg/3 mL Nebu Take 3 mLs (0.63 mg total) by nebulization every 6 (six) hours as needed. Rescue 1 Box 1    amLODIPine (NORVASC) 10 MG tablet Take 1 tablet (10 mg total) by mouth once daily. 90 tablet 0    blood sugar diagnostic Strp 1 strip by Misc.(Non-Drug; Combo Route) route 3 (three) times daily. Freestyle Lite strips 100 each 1    calcium  carbonate-vitamin D3 (CALCIUM 600 + D,3,) 600 mg calcium- 200 unit Cap Take 600 mg by mouth every evening. 90 capsule 0    calcium phosphate-melatonin 250-1.5 mg Chew Take by mouth.      citalopram (CELEXA) 10 MG tablet Take 1 tablet (10 mg total) by mouth once daily. 90 tablet 0    estradioL (ESTRACE) 0.01 % (0.1 mg/gram) vaginal cream Place 1 g vaginally every Mon, Wed, Fri. First 2 weeks every day Next 2 weeks every other day  thereafter M,W, F (Patient not taking: No sig reported) 1 Tube 11    fenofibrate (TRICOR) 145 MG tablet Take 1 tablet (145 mg total) by mouth once daily. 90 tablet 0    fluticasone-salmeterol diskus inhaler 250-50 mcg INHALE 1 DOSE INTO THE LUNGS TWICE DAILY 60 each 0    FLUZONE HIGHDOSE QUAD 20-21  mcg/0.7 mL Syrg PHARMACIST ADMINISTERED IMMUNIZATION ADMINISTERED AT TIME OF DISPENSING      gabapentin (NEURONTIN) 300 MG capsule Take 1 capsule (300 mg total) by mouth 2 (two) times daily. 60 capsule 0    glucagon (BAQSIMI) 3 mg/actuation Spry 3 mg (one actuation) into a single nostril; if no response, may repeat in 15 minutes using a new intranasal device. 2 each 1    glucosamine-chondroitin 500-400 mg tablet Take 1 tablet by mouth once daily. 90 tablet 0    insulin lispro (HUMALOG U-100 INSULIN) 100 unit/mL injection To use with VGo20 . Administer  Clicks 3 times per day with food. Max TDD of 100 units 30 mL 6    ipratropium (ATROVENT HFA) 17 mcg/actuation inhaler Inhale 2 puffs into the lungs every 6 (six) hours. Rescue 12.9 g 1    linaCLOtide (LINZESS) 72 mcg Cap capsule Take 1 capsule (72 mcg total) by mouth before breakfast. 30 capsule 0    lisinopriL 10 MG tablet TAKE 1 TABLET BY MOUTH TWICE DAILY FOR BLOOD PRESSURE 180 tablet 0    LORazepam (ATIVAN) 0.5 MG tablet       metFORMIN (GLUCOPHAGE-XR) 500 MG ER 24hr tablet Take 1 tablet (500 mg total) by mouth 2 (two) times daily with meals. 180 tablet 2    mirabegron (MYRBETRIQ) 25 mg Tb24 ER tablet Take 1 tablet (25 mg  total) by mouth once daily. 30 tablet 11    orphenadrine (NORFLEX) 100 mg tablet TAKE 1 TABLET BY MOUTH TWICE DAILY AS NEEDED FOR MUSCLE SPASM 180 tablet 0    oxyCODONE-acetaminophen (PERCOCET) 5-325 mg per tablet Take 1 tablet by mouth every 8 (eight) hours as needed for Pain. 20 tablet 0    pantoprazole (PROTONIX) 40 MG tablet Take 1 tablet (40 mg total) by mouth once daily. For stomach- reflux 90 tablet 0    polycarbophil (FIBERCON) 625 mg tablet Take 1 tablet (625 mg total) by mouth once daily.      rivaroxaban (XARELTO) 10 mg Tab Take 1 tablet (10 mg total) by mouth daily with dinner or evening meal. 15 tablet 0    rosuvastatin (CRESTOR) 20 MG tablet TAKE 1 TABLET BY MOUTH ONCE DAILY NIGHTLY 90 tablet 0    semaglutide (OZEMPIC) 0.25 mg or 0.5 mg(2 mg/1.5 mL) pen injector Inject 0.5 mg into the skin every 7 days. 1 pen 6    sub-q insulin device, 20 unit (V-GO 20) Leslye 1 Device by Misc.(Non-Drug; Combo Route) route once daily. 30 each 6    traZODone (DESYREL) 100 MG tablet Take 1 tablet (100 mg total) by mouth every evening. For sleep 90 tablet 0       Review of patient's allergies indicates:   Allergen Reactions    Aspirin Hives    Aleve [naproxen sodium] Hives    Iodinated contrast media     Iodine Other (See Comments)     WHEN INJECTED- pt  states she coded    Pcn [penicillins] Rash         Review of systems:  Gen: denies fevers, chills, nausea, vomitting, weight changes  HEENT: Denies discharge or coughing/sneezing.   Resp: Denies SOB  CV: Denies CP or palpitations  Abd: Denies tenderness, diarrhea, constipation, nausea  Ext:  Denies pain or edema    Physical Exam  BP: Vital signs stable  General: No apparent distress  HEENT: Normocephalic, atraumatic  Lungs: Adequate respirations, no respiratory distress  Heart: Pulses intact  Abdomen: Soft, no distention  Rectal/pelvic: Deferred     Assessment  Patient is a 71 y.o. female with bilateral acquired mechanical ptosis, bilateral acquired myogenic  ptosis, dermatochalasis of both lower eyelids, bilateral lower eyelid lateral canthal laxity   - Risks/benefits/alternatives of the procedure including, but not limited to scarring, bleeding, infection, loss or decreased vision, and/or need for possible repeat surgery discussed with the patient and/or family, and Informed consent obtained prior to surgery and the patient/family voiced good understanding, witnessed, and placed in chart.  - Will proceed with bilateral upper eyelid mmcr 8/10 and bilateral upper eyelid blepharoplasty.   Possible bilateral lower eyelid subtractive cosmetic blepharoplasty with pinch and canthoplasty  - Plan for local/MAC vs. General anesthesia   - Hold ASA, NSAIDS, and fish oil 5 to 7 days prior to procedure.

## 2022-06-21 RX ORDER — HYDROCODONE BITARTRATE AND ACETAMINOPHEN 5; 325 MG/1; MG/1
1 TABLET ORAL EVERY 6 HOURS PRN
Qty: 16 TABLET | Refills: 0 | Status: SHIPPED | OUTPATIENT
Start: 2022-06-21 | End: 2022-11-21 | Stop reason: CLARIF

## 2022-06-22 ENCOUNTER — ANESTHESIA (OUTPATIENT)
Dept: SURGERY | Facility: HOSPITAL | Age: 72
End: 2022-06-22
Payer: MEDICARE

## 2022-07-25 ENCOUNTER — TELEPHONE (OUTPATIENT)
Dept: DIABETES | Facility: CLINIC | Age: 72
End: 2022-07-25
Payer: MEDICARE

## 2022-08-15 ENCOUNTER — TELEPHONE (OUTPATIENT)
Dept: DIABETES | Facility: CLINIC | Age: 72
End: 2022-08-15
Payer: MEDICARE

## 2022-08-19 ENCOUNTER — TELEPHONE (OUTPATIENT)
Dept: DIABETES | Facility: CLINIC | Age: 72
End: 2022-08-19
Payer: MEDICARE

## 2022-08-22 ENCOUNTER — TELEPHONE (OUTPATIENT)
Dept: DIABETES | Facility: CLINIC | Age: 72
End: 2022-08-22
Payer: MEDICARE

## 2022-08-31 ENCOUNTER — TELEPHONE (OUTPATIENT)
Dept: DIABETES | Facility: CLINIC | Age: 72
End: 2022-08-31
Payer: MEDICARE

## 2022-09-06 ENCOUNTER — TELEPHONE (OUTPATIENT)
Dept: DIABETES | Facility: CLINIC | Age: 72
End: 2022-09-06
Payer: MEDICARE

## 2022-09-06 NOTE — TELEPHONE ENCOUNTER
Patient came in to clinic for refill for Dexcom.. advised we would send request to PCP. She advised Yuni manages her diabetes. I advised all patient will be referred back to PCP while yuni is on leave. She wanted to know who was filling in. I advised letters were sent to all patients PCP as Yuni was not in clinic to advised.  Verbalized understanding.. we will need to get paperwork and fax to Dr Albert.

## 2022-09-07 ENCOUNTER — TELEPHONE (OUTPATIENT)
Dept: DIABETES | Facility: CLINIC | Age: 72
End: 2022-09-07
Payer: MEDICARE

## 2022-09-07 NOTE — TELEPHONE ENCOUNTER
Spoke to pt, pt stated she will come in to  sample until new supplies come in, also stated she would [facility]

## 2022-09-07 NOTE — TELEPHONE ENCOUNTER
Spoke to Annabel , stated pt does not need new order for  dexcom, stated pt needs to call in to reorder supplies

## 2022-09-21 ENCOUNTER — TELEPHONE (OUTPATIENT)
Dept: DIABETES | Facility: CLINIC | Age: 72
End: 2022-09-21
Payer: MEDICARE

## 2022-09-23 ENCOUNTER — TELEPHONE (OUTPATIENT)
Dept: OPHTHALMOLOGY | Facility: CLINIC | Age: 72
End: 2022-09-23
Payer: MEDICARE

## 2022-09-23 ENCOUNTER — PATIENT MESSAGE (OUTPATIENT)
Dept: OPHTHALMOLOGY | Facility: CLINIC | Age: 72
End: 2022-09-23
Payer: MEDICARE

## 2022-09-23 NOTE — TELEPHONE ENCOUNTER
Called pt to r/s sx [facility] on 9/30 due to  being out there was no answer a msg was left and also a msg sent to the pt through the portal.

## 2022-10-25 ENCOUNTER — TELEPHONE (OUTPATIENT)
Dept: DIABETES | Facility: CLINIC | Age: 72
End: 2022-10-25
Payer: MEDICARE

## 2022-11-01 ENCOUNTER — TELEPHONE (OUTPATIENT)
Dept: CARDIOLOGY | Facility: CLINIC | Age: 72
End: 2022-11-01
Payer: MEDICARE

## 2022-11-01 NOTE — TELEPHONE ENCOUNTER
Spoke with patient.  Appointment scheduled for pre-op clearance for knee surgery.      ----- Message from Chioma Jha sent at 11/1/2022  2:46 PM CDT -----  Regarding: appt  Contact: pt [phone]  Pt is calling to schedule an appt as soon as possible, pt has referral in [facility]. Please call

## 2022-11-03 ENCOUNTER — TELEPHONE (OUTPATIENT)
Dept: OPHTHALMOLOGY | Facility: CLINIC | Age: 72
End: 2022-11-03
Payer: MEDICARE

## 2022-11-03 ENCOUNTER — OFFICE VISIT (OUTPATIENT)
Dept: CARDIOLOGY | Facility: CLINIC | Age: 72
End: 2022-11-03
Payer: MEDICARE

## 2022-11-03 VITALS
SYSTOLIC BLOOD PRESSURE: 126 MMHG | BODY MASS INDEX: 32.94 KG/M2 | OXYGEN SATURATION: 99 % | HEIGHT: 61 IN | HEART RATE: 76 BPM | WEIGHT: 174.5 LBS | DIASTOLIC BLOOD PRESSURE: 78 MMHG

## 2022-11-03 DIAGNOSIS — I10 PRIMARY HYPERTENSION: Primary | ICD-10-CM

## 2022-11-03 DIAGNOSIS — R94.31 NONSPECIFIC ABNORMAL ELECTROCARDIOGRAM (ECG) (EKG): ICD-10-CM

## 2022-11-03 DIAGNOSIS — Z01.818 PREOPERATIVE CLEARANCE: ICD-10-CM

## 2022-11-03 DIAGNOSIS — R94.31 ABNORMAL ELECTROCARDIOGRAM (ECG) (EKG): ICD-10-CM

## 2022-11-03 DIAGNOSIS — Z01.818 PRE-OP EVALUATION: ICD-10-CM

## 2022-11-03 DIAGNOSIS — E78.2 MIXED HYPERLIPIDEMIA: Chronic | ICD-10-CM

## 2022-11-03 PROCEDURE — 99203 PR OFFICE/OUTPT VISIT, NEW, LEVL III, 30-44 MIN: ICD-10-PCS | Mod: S$PBB,,, | Performed by: INTERNAL MEDICINE

## 2022-11-03 PROCEDURE — 99203 OFFICE O/P NEW LOW 30 MIN: CPT | Mod: S$PBB,,, | Performed by: INTERNAL MEDICINE

## 2022-11-03 PROCEDURE — 99215 OFFICE O/P EST HI 40 MIN: CPT | Mod: PBBFAC,PN | Performed by: INTERNAL MEDICINE

## 2022-11-03 PROCEDURE — 99999 PR PBB SHADOW E&M-EST. PATIENT-LVL V: ICD-10-PCS | Mod: PBBFAC,,, | Performed by: INTERNAL MEDICINE

## 2022-11-03 PROCEDURE — 99999 PR PBB SHADOW E&M-EST. PATIENT-LVL V: CPT | Mod: PBBFAC,,, | Performed by: INTERNAL MEDICINE

## 2022-11-03 NOTE — PROGRESS NOTES
[facility] - Cardiology [address]  Cardiology Clinic Note      Chief Complaint  Chief Complaint   Patient presents with    Pre-op Exam       HPI:    72-year-old female with past medical history obesity, history breast cancer documented in chart patient denies, thyroid nodule, GERD, asthma/COPD, PN, diabetes, hyperlipidemia, hypertension, coronary atherosclerosis by CT    No dyspnea or CP but unable to meet adequate METs due to OA    Medications  Current Outpatient Medications   Medication Sig Dispense Refill    albuterol (ACCUNEB) 0.63 mg/3 mL Nebu Take 3 mLs (0.63 mg total) by nebulization every 6 (six) hours as needed. Rescue (Patient not taking: Reported on 11/1/2022) 1 Box 1    amLODIPine (NORVASC) 10 MG tablet Take 1 tablet (10 mg total) by mouth once daily. 90 tablet 0    blood sugar diagnostic Strp 1 strip by Misc.(Non-Drug; Combo Route) route 3 (three) times daily. Freestyle Lite strips 100 each 1    calcium carbonate-vitamin D3 (CALCIUM 600 + D,3,) 600 mg calcium- 200 unit Cap Take 600 mg by mouth every evening. 90 capsule 0    calcium phosphate-melatonin 250-1.5 mg Chew Take by mouth.      citalopram (CELEXA) 10 MG tablet Take 1 tablet (10 mg total) by mouth once daily. (Patient not taking: Reported on 11/1/2022) 90 tablet 0    estradioL (ESTRACE) 0.01 % (0.1 mg/gram) vaginal cream Place 1 g vaginally every Mon, Wed, Fri. First 2 weeks every day Next 2 weeks every other day  thereafter M,W, F (Patient not taking: No sig reported) 1 Tube 11    fenofibrate (TRICOR) 145 MG tablet Take 1 tablet (145 mg total) by mouth once daily. 90 tablet 0    fluticasone-salmeterol diskus inhaler 250-50 mcg INHALE 1 DOSE INTO THE LUNGS TWICE DAILY (Patient not taking: Reported on 11/1/2022) 60 each 0    FLUZONE HIGHDOSE QUAD 20-21  mcg/0.7 mL Syrg PHARMACIST ADMINISTERED IMMUNIZATION ADMINISTERED AT TIME OF DISPENSING      gabapentin (NEURONTIN) 300 MG capsule Take 1 capsule (300 mg total) by mouth 2 (two) times daily.  60 capsule 2    glucagon (BAQSIMI) 3 mg/actuation Spry 3 mg (one actuation) into a single nostril; if no response, may repeat in 15 minutes using a new intranasal device. (Patient not taking: Reported on 11/1/2022) 2 each 1    glucosamine-chondroitin 500-400 mg tablet Take 1 tablet by mouth once daily. 90 tablet 0    HYDROcodone-acetaminophen (NORCO) 5-325 mg per tablet Take 1 tablet by mouth every 6 (six) hours as needed for Pain. 16 tablet 0    insulin lispro (HUMALOG U-100 INSULIN) 100 unit/mL injection To use with VGo20 . Administer  Clicks 3 times per day with food. Max TDD of 100 units 30 mL 6    ipratropium (ATROVENT HFA) 17 mcg/actuation inhaler Inhale 2 puffs into the lungs every 6 (six) hours. Rescue (Patient not taking: Reported on 11/1/2022) 12.9 g 1    linaCLOtide (LINZESS) 72 mcg Cap capsule Take 1 capsule (72 mcg total) by mouth before breakfast. 30 capsule 0    lisinopriL 10 MG tablet Take 1 tablet (10 mg total) by mouth 2 (two) times daily. 180 tablet 0    LORazepam (ATIVAN) 0.5 MG tablet       metFORMIN (GLUCOPHAGE-XR) 500 MG ER 24hr tablet Take 1 tablet (500 mg total) by mouth 2 (two) times daily with meals. 180 tablet 2    mirabegron (MYRBETRIQ) 25 mg Tb24 ER tablet TAKE ONE TABLET BY MOUTH DAILY (Patient not taking: Reported on 11/1/2022) 30 tablet 0    orphenadrine (NORFLEX) 100 mg tablet Take 1 tablet (100 mg total) by mouth 2 (two) times daily as needed. 180 tablet 0    oxyCODONE-acetaminophen (PERCOCET) 5-325 mg per tablet Take 1 tablet by mouth every 8 (eight) hours as needed for Pain. (Patient not taking: No sig reported) 20 tablet 0    pantoprazole (PROTONIX) 40 MG tablet Take 1 tablet (40 mg total) by mouth once daily. For stomach- reflux (Patient not taking: Reported on 9/6/2022) 90 tablet 0    polycarbophil (FIBERCON) 625 mg tablet Take 1 tablet (625 mg total) by mouth once daily. (Patient not taking: Reported on 9/6/2022)      rivaroxaban (XARELTO) 10 mg Tab Take 1 tablet (10 mg  total) by mouth daily with dinner or evening meal. (Patient not taking: Reported on 11/1/2022) 15 tablet 0    rosuvastatin (CRESTOR) 20 MG tablet TAKE 1 TABLET BY MOUTH ONCE DAILY NIGHTLY 90 tablet 0    semaglutide (OZEMPIC) 0.25 mg or 0.5 mg(2 mg/1.5 mL) pen injector Inject 0.5 mg into the skin every 7 days. 1 pen 6    traMADoL (ULTRAM) 50 mg tablet Take 1 tablet (50 mg total) by mouth every 6 (six) hours as needed for Pain. 20 tablet 0    traZODone (DESYREL) 100 MG tablet Take 1 tablet (100 mg total) by mouth every evening. For sleep 90 tablet 0    V-GO 20 Leslye USE AS DIRECTED ONCE A DAY 30 each 5     No current facility-administered medications for this visit.        History  Past Medical History:   Diagnosis Date    Allergy     Arthritis     Asthma     Back injuries     two broken bones that healed on own    Cataract     Depression     Diabetes mellitus     Diabetes mellitus type I     GERD (gastroesophageal reflux disease)     Hypercholesteremia     Hypertension     Insomnia     Slow to wake up after anesthesia 2002    Orthoscope of knee     Past Surgical History:   Procedure Laterality Date    anal fissure surgery      BREAST BIOPSY Right     benign    BRONCHOSCOPY N/A 10/22/2019    Procedure: bronch floro , noon;  Surgeon: Pepe Andersen MD;  Location: [facility];  Service: Endoscopy;  Laterality: N/A;    COLONOSCOPY  09/18/2020    COLONOSCOPY N/A 9/18/2020    Procedure: COLONOSCOPY;  Surgeon: Magno Gant MD;  Location: [facility];  Service: Colon and Rectal;  Laterality: N/A;    HYSTERECTOMY      KNEE ARTHROSCOPY W/ DEBRIDEMENT Left     x3    LIPOMA RESECTION      fatty tumor between breast    SHOULDER SURGERY Left 2/11/2016    Dr Burris     TONSILLECTOMY      TOTAL KNEE ARTHROPLASTY Right 3/18/2021    Procedure: ARTHROPLASTY, KNEE, TOTAL;  Surgeon: Rashi De La O MD;  Location: [facility];  Service: Orthopedics;  Laterality: Right;  ADDUCTOR BLOCK    WRIST RECONSTRUCTION Right     tendon     Social  History     Socioeconomic History    Marital status:    Occupational History    Occupation: [employer]     Employer: [employer]   Tobacco Use    Smoking status: Never    Smokeless tobacco: Never   Substance and Sexual Activity    Alcohol use: Yes     Alcohol/week: 1.0 standard drink     Types: 1 Shots of liquor per week     Comment: socially    Drug use: No    Sexual activity: Never     Family History   Problem Relation Age of Onset    Arthritis Mother     Hypertension Mother     Arthritis Father     Diabetes Father     Hypertension Father     Breast cancer Sister     Cancer Sister         breast cancer     Breast cancer Paternal Aunt         Allergies  Review of patient's allergies indicates:   Allergen Reactions    Aspirin Hives    Aleve [naproxen sodium] Hives    Iodinated contrast media     Iodine Other (See Comments)     WHEN INJECTED- pt  states she coded    Pcn [penicillins] Rash       Review of Systems   Review of Systems   Constitutional: Negative for fever.   HENT:  Negative for nosebleeds.    Eyes:  Negative for visual disturbance.   Cardiovascular:  Negative for chest pain, claudication, dyspnea on exertion, palpitations and syncope.   Respiratory:  Negative for cough, hemoptysis and wheezing.    Endocrine: Negative for cold intolerance, heat intolerance, polyphagia and polyuria.   Hematologic/Lymphatic: Negative for bleeding problem.   Skin:  Negative for rash.   Musculoskeletal:  Negative for myalgias.   Gastrointestinal:  Negative for hematemesis, hematochezia, nausea and vomiting.   Genitourinary:  Negative for dysuria.   Neurological:  Negative for focal weakness and sensory change.   Psychiatric/Behavioral:  Negative for altered mental status.      Physical Exam  There were no vitals filed for this visit.  Wt Readings from Last 1 Encounters:   11/03/22 79.2 kg (174 lb 7.9 oz)     Wt Readings from Last 3 Encounters:   11/03/22 79.2 kg (174 lb 7.9 oz)   11/01/22 78.9  kg (174 lb)   10/03/22 74.4 kg (164 lb)     Temp Readings from Last 3 Encounters:   11/01/22 97 °F (36.1 °C) (Temporal)   10/03/22 98 °F (36.7 °C) (Temporal)   09/06/22 97.3 °F (36.3 °C) (Temporal)     BP Readings from Last 3 Encounters:   11/03/22 126/78   11/01/22 103/66   10/03/22 99/67     Pulse Readings from Last 3 Encounters:   11/03/22 76   11/01/22 77   10/03/22 67       Physical Exam  Constitutional:       General: She is not in acute distress.  HENT:      Head: Normocephalic and atraumatic.      Mouth/Throat:      Mouth: Mucous membranes are moist.   Eyes:      Extraocular Movements: Extraocular movements intact.      Pupils: Pupils are equal, round, and reactive to light.   Neck:      Vascular: No carotid bruit or JVD.   Cardiovascular:      Rate and Rhythm: Normal rate and regular rhythm.      Heart sounds: No murmur heard.    No friction rub. No gallop.   Pulmonary:      Effort: Pulmonary effort is normal.      Breath sounds: Normal breath sounds.   Abdominal:      Tenderness: There is no abdominal tenderness. There is no guarding or rebound.   Musculoskeletal:      Right lower leg: No edema.      Left lower leg: No edema.   Skin:     General: Skin is warm and dry.      Capillary Refill: Capillary refill takes less than 2 seconds.   Neurological:      General: No focal deficit present.      Mental Status: She is alert.   Psychiatric:         Mood and Affect: Mood normal.       Labs  Lab Visit on 11/03/2022   Component Date Value Ref Range Status    WBC 11/03/2022 5.77  3.90 - 12.70 K/uL Final    RBC 11/03/2022 4.59  4.00 - 5.40 M/uL Final    Hemoglobin 11/03/2022 12.8  12.0 - 16.0 g/dL Final    Hematocrit 11/03/2022 39.9  37.0 - 48.5 % Final    MCV 11/03/2022 87  82 - 98 fL Final    MCH 11/03/2022 27.9  27.0 - 31.0 pg Final    MCHC 11/03/2022 32.1  32.0 - 36.0 g/dL Final    RDW 11/03/2022 13.1  11.5 - 14.5 % Final    Platelets 11/03/2022 327  150 - 450 K/uL Final    MPV 11/03/2022 9.4  9.2 - 12.9 fL  Final    Immature Granulocytes 11/03/2022 0.2  0.0 - 0.5 % Final    Gran # (ANC) 11/03/2022 3.3  1.8 - 7.7 K/uL Final    Immature Grans (Abs) 11/03/2022 0.01  0.00 - 0.04 K/uL Final    Comment: Mild elevation in immature granulocytes is non specific and   can be seen in a variety of conditions including stress response,   acute inflammation, trauma and pregnancy. Correlation with other   laboratory and clinical findings is essential.      Lymph # 11/03/2022 1.8  1.0 - 4.8 K/uL Final    Mono # 11/03/2022 0.5  0.3 - 1.0 K/uL Final    Eos # 11/03/2022 0.2  0.0 - 0.5 K/uL Final    Baso # 11/03/2022 0.05  0.00 - 0.20 K/uL Final    nRBC 11/03/2022 0  0 /100 WBC Final    Gran % 11/03/2022 57.0  38.0 - 73.0 % Final    Lymph % 11/03/2022 30.8  18.0 - 48.0 % Final    Mono % 11/03/2022 7.8  4.0 - 15.0 % Final    Eosinophil % 11/03/2022 3.3  0.0 - 8.0 % Final    Basophil % 11/03/2022 0.9  0.0 - 1.9 % Final    Differential Method 11/03/2022 Automated   Final    aPTT 11/03/2022 23.3  21.0 - 32.0 sec Final    Comment: aPTT therapeutic range = 39-69 seconds  LOT^050^APTT FSL^[id number]      Prothrombin Time 11/03/2022 10.5  9.0 - 12.5 sec Final    INR 11/03/2022 1.0  0.8 - 1.2 Final    Comment: Coumadin Therapy:  2.0 - 3.0 for INR for all indicators except mechanical heart valves  and antiphospholipid syndromes which should use 2.5 - 3.5.  LOT^040^PT Inn^[id number]     Lab Visit on 09/07/2022   Component Date Value Ref Range Status    aPTT 09/07/2022 23.4  21.0 - 32.0 sec Final    Comment: aPTT therapeutic range = 39-69 seconds  LOT^050^APTT FSL^[id number]      Prothrombin Time 09/07/2022 10.2  9.0 - 12.5 sec Final    INR 09/07/2022 0.9  0.8 - 1.2 Final    Comment: Coumadin Therapy:  2.0 - 3.0 for INR for all indicators except mechanical heart valves  and antiphospholipid syndromes which should use 2.5 - 3.5.  LOT^040^PT Inn^[id number]     Lab Visit on 08/08/2022   Component Date Value Ref Range Status    Specimen UA 08/08/2022 Urine, Clean Catch    Final    Color, UA 08/08/2022 Yellow  Yellow, Straw, Luz Maria Final    Appearance, UA 08/08/2022 Clear  Clear Final    pH, UA 08/08/2022 6.0  5.0 - 8.0 Final    Specific Gravity, UA 08/08/2022 >=1.030 (A)  1.005 - 1.030 Final    Protein, UA 08/08/2022 Negative  Negative Final    Comment: Recommend a 24 hour urine protein or a urine   protein/creatinine ratio if globulin induced proteinuria is  clinically suspected.      Glucose, UA 08/08/2022 Negative  Negative Final    Ketones, UA 08/08/2022 Negative  Negative Final    Bilirubin (UA) 08/08/2022 Negative  Negative Final    Occult Blood UA 08/08/2022 1+ (A)  Negative Final    Nitrite, UA 08/08/2022 Negative  Negative Final    Urobilinogen, UA 08/08/2022 Negative  Negative EU/dL Final    Leukocytes, UA 08/08/2022 Negative  Negative Final    Hemoglobin A1C 08/08/2022 6.7 (H)  4.0 - 5.6 % Final    Comment: ADA Screening Guidelines:  5.7-6.4%  Consistent with prediabetes  >or=6.5%  Consistent with diabetes    High levels of fetal hemoglobin interfere with the HbA1C  assay. Heterozygous hemoglobin variants (HbS, HgC, etc)do  not significantly interfere with this assay.   However, presence of multiple variants may affect accuracy.      Estimated Avg Glucose 08/08/2022 146 (H)  68 - 131 mg/dL Final    RBC, UA 08/08/2022 5 (H)  0 - 4 /hpf Final    WBC, UA 08/08/2022 6 (H)  0 - 5 /hpf Final    Bacteria 08/08/2022 Few (A)  None-Occ /hpf Final    Ca Oxalate [place]usha, UA 08/08/2022 Moderate  None-Moderate Final    Microscopic Comment 08/08/2022 SEE COMMENT   Final    Comment: Other formed elements not mentioned in the report are not   present in the microscopic examination.      Lab Visit on 05/26/2022   Component Date Value Ref Range Status    WBC 05/26/2022 7.89  3.90 - 12.70 K/uL Final    RBC 05/26/2022 4.41  4.00 - 5.40 M/uL Final    Hemoglobin 05/26/2022 12.7  12.0 - 16.0 g/dL Final    Hematocrit 05/26/2022 39.8  37.0 - 48.5 % Final    MCV 05/26/2022 90  82 - 98 fL Final    MCH  05/26/2022 28.8  27.0 - 31.0 pg Final    MCHC 05/26/2022 31.9 (L)  32.0 - 36.0 g/dL Final    RDW 05/26/2022 12.9  11.5 - 14.5 % Final    Platelets 05/26/2022 283  150 - 450 K/uL Final    MPV 05/26/2022 9.1 (L)  9.2 - 12.9 fL Final    Immature Granulocytes 05/26/2022 0.3  0.0 - 0.5 % Final    Gran # (ANC) 05/26/2022 6.5  1.8 - 7.7 K/uL Final    Immature Grans (Abs) 05/26/2022 0.02  0.00 - 0.04 K/uL Final    Comment: Mild elevation in immature granulocytes is non specific and   can be seen in a variety of conditions including stress response,   acute inflammation, trauma and pregnancy. Correlation with other   laboratory and clinical findings is essential.      Lymph # 05/26/2022 0.8 (L)  1.0 - 4.8 K/uL Final    Mono # 05/26/2022 0.5  0.3 - 1.0 K/uL Final    Eos # 05/26/2022 0.1  0.0 - 0.5 K/uL Final    Baso # 05/26/2022 0.02  0.00 - 0.20 K/uL Final    nRBC 05/26/2022 0  0 /100 WBC Final    Gran % 05/26/2022 81.7 (H)  38.0 - 73.0 % Final    Lymph % 05/26/2022 9.9 (L)  18.0 - 48.0 % Final    Mono % 05/26/2022 6.0  4.0 - 15.0 % Final    Eosinophil % 05/26/2022 1.8  0.0 - 8.0 % Final    Basophil % 05/26/2022 0.3  0.0 - 1.9 % Final    Differential Method 05/26/2022 Automated   Final    Sodium 05/26/2022 141  136 - 145 mmol/L Final    Potassium 05/26/2022 4.1  3.5 - 5.1 mmol/L Final    Chloride 05/26/2022 106  95 - 110 mmol/L Final    CO2 05/26/2022 22 (L)  23 - 29 mmol/L Final    Glucose 05/26/2022 103  70 - 110 mg/dL Final    BUN 05/26/2022 19  8 - 23 mg/dL Final    Creatinine 05/26/2022 0.8  0.5 - 1.4 mg/dL Final    Calcium 05/26/2022 10.0  8.7 - 10.5 mg/dL Final    Total Protein 05/26/2022 7.2  6.0 - 8.4 g/dL Final    Albumin 05/26/2022 4.2  3.5 - 5.2 g/dL Final    Total Bilirubin 05/26/2022 0.6  0.1 - 1.0 mg/dL Final    Comment: For infants and newborns, interpretation of results should be based  on gestational age, weight and in agreement with clinical  observations.    Premature Infant recommended reference  ranges:  Up to 24 hours.............<8.0 mg/dL  Up to 48 hours............<12.0 mg/dL  3-5 days..................<15.0 mg/dL  6-29 days.................<15.0 mg/dL      Alkaline Phosphatase 05/26/2022 81  55 - 135 U/L Final    AST 05/26/2022 17  10 - 40 U/L Final    ALT 05/26/2022 7 (L)  10 - 44 U/L Final    Anion Gap 05/26/2022 13  8 - 16 mmol/L Final    eGFR if African American 05/26/2022 >60.0  >60 mL/min/1.73 m^2 Final    eGFR if non African American 05/26/2022 >60.0  >60 mL/min/1.73 m^2 Final    Comment: Calculation used to obtain the estimated glomerular filtration  rate (eGFR) is the CKD-EPI equation.       Cholesterol 05/26/2022 126  120 - 199 mg/dL Final    Comment: The National Cholesterol Education Program (NCEP) has set the  following guidelines (reference ranges) for Cholesterol:  Optimal.....................<200 mg/dL  Borderline High.............200-239 mg/dL  High........................> or = 240 mg/dL      Triglycerides 05/26/2022 118  30 - 150 mg/dL Final    Comment: The National Cholesterol Education Program (NCEP) has set the  following guidelines (reference values) for triglycerides:  Normal......................<150 mg/dL  Borderline High.............150-199 mg/dL  High........................200-499 mg/dL      HDL 05/26/2022 42  40 - 75 mg/dL Final    Comment: The National Cholesterol Education Program (NCEP) has set the  following guidelines (reference values) for HDL Cholesterol:  Low...............<40 mg/dL  Optimal...........>60 mg/dL      LDL Cholesterol 05/26/2022 60.4 (L)  63.0 - 159.0 mg/dL Final    Comment: The National Cholesterol Education Program (NCEP) has set the  following guidelines (reference values) for LDL Cholesterol:  Optimal.......................<130 mg/dL  Borderline High...............130-159 mg/dL  High..........................160-189 mg/dL  Very High.....................>190 mg/dL      HDL/Cholesterol Ratio 05/26/2022 33.3  20.0 - 50.0 % Final    Total  Cholesterol/HDL Ratio 05/26/2022 3.0  2.0 - 5.0 Final    Non-HDL Cholesterol 05/26/2022 84  mg/dL Final    Comment: Risk category and Non-HDL cholesterol goals:  Coronary heart disease (CHD)or equivalent (10-year risk of CHD >20%):  Non-HDL cholesterol goal     <130 mg/dL  Two or more CHD risk factors and 10-year risk of CHD <= 20%:  Non-HDL cholesterol goal     <160 mg/dL  0 to 1 CHD risk factor:  Non-HDL cholesterol goal     <190 mg/dL      TSH 05/26/2022 1.462  0.400 - 4.000 uIU/mL Final    Vit D, 25-Hydroxy 05/26/2022 28 (L)  30 - 96 ng/mL Final    Comment: Vitamin D deficiency.........<10 ng/mL                              Vitamin D insufficiency......10-29 ng/mL       Vitamin D sufficiency........> or equal to 30 ng/mL  Vitamin D toxicity............>100 ng/mL      Hemoglobin A1C 05/26/2022 6.7 (H)  4.0 - 5.6 % Final    Comment: ADA Screening Guidelines:  5.7-6.4%  Consistent with prediabetes  >or=6.5%  Consistent with diabetes    High levels of fetal hemoglobin interfere with the HbA1C  assay. Heterozygous hemoglobin variants (HbS, HgC, etc)do  not significantly interfere with this assay.   However, presence of multiple variants may affect accuracy.      Estimated Avg Glucose 05/26/2022 146 (H)  68 - 131 mg/dL Final    aPTT 05/26/2022 22.6  21.0 - 32.0 sec Final    Comment: aPTT therapeutic range = 39-69 seconds  LOT^050^APTT FSL^[id number]      Prothrombin Time 05/26/2022 10.3  9.0 - 12.5 sec Final    INR 05/26/2022 0.9  0.8 - 1.2 Final    Comment: Coumadin Therapy:  2.0 - 3.0 for INR for all indicators except mechanical heart valves  and antiphospholipid syndromes which should use 2.5 - 3.5.  LOT^040^PT Inn^[id number]         EKG  NSR NSSTT changes 9/2022    Echo   No results found for this or any previous visit.    Imaging  No results found.    Prior coronary angiogram / intervention:  None    Assessment and Plan    72-year-old female with past medical history obesity, history breast cancer documented in chart  patient denies, thyroid nodule, GERD, asthma/COPD, PN, diabetes, hyperlipidemia, hypertension, coronary atherosclerosis by CT      1. Pre-op evaluation / abnormal EKG  MPI  If low risk proceed with surgery    2. Primary hypertension  Norvasc, lisinopril  Controlled    3. Mixed hyperlipidemia  Statin      Follow Up  1 year or sooner EDEL Chang MD, FACC, RPVI  Interventional Cardiology

## 2022-11-03 NOTE — H&P (VIEW-ONLY)
[facility] - Cardiology [address]  Cardiology Clinic Note      Chief Complaint  Chief Complaint   Patient presents with    Pre-op Exam       HPI:    72-year-old female with past medical history obesity, history breast cancer documented in chart patient denies, thyroid nodule, GERD, asthma/COPD, PN, diabetes, hyperlipidemia, hypertension, coronary atherosclerosis by CT    No dyspnea or CP but unable to meet adequate METs due to OA    Medications  Current Outpatient Medications   Medication Sig Dispense Refill    albuterol (ACCUNEB) 0.63 mg/3 mL Nebu Take 3 mLs (0.63 mg total) by nebulization every 6 (six) hours as needed. Rescue (Patient not taking: Reported on 11/1/2022) 1 Box 1    amLODIPine (NORVASC) 10 MG tablet Take 1 tablet (10 mg total) by mouth once daily. 90 tablet 0    blood sugar diagnostic Strp 1 strip by Misc.(Non-Drug; Combo Route) route 3 (three) times daily. Freestyle Lite strips 100 each 1    calcium carbonate-vitamin D3 (CALCIUM 600 + D,3,) 600 mg calcium- 200 unit Cap Take 600 mg by mouth every evening. 90 capsule 0    calcium phosphate-melatonin 250-1.5 mg Chew Take by mouth.      citalopram (CELEXA) 10 MG tablet Take 1 tablet (10 mg total) by mouth once daily. (Patient not taking: Reported on 11/1/2022) 90 tablet 0    estradioL (ESTRACE) 0.01 % (0.1 mg/gram) vaginal cream Place 1 g vaginally every Mon, Wed, Fri. First 2 weeks every day Next 2 weeks every other day  thereafter M,W, F (Patient not taking: No sig reported) 1 Tube 11    fenofibrate (TRICOR) 145 MG tablet Take 1 tablet (145 mg total) by mouth once daily. 90 tablet 0    fluticasone-salmeterol diskus inhaler 250-50 mcg INHALE 1 DOSE INTO THE LUNGS TWICE DAILY (Patient not taking: Reported on 11/1/2022) 60 each 0    FLUZONE HIGHDOSE QUAD 20-21  mcg/0.7 mL Syrg PHARMACIST ADMINISTERED IMMUNIZATION ADMINISTERED AT TIME OF DISPENSING      gabapentin (NEURONTIN) 300 MG capsule Take 1 capsule (300 mg total) by mouth 2 (two) times daily.  60 capsule 2    glucagon (BAQSIMI) 3 mg/actuation Spry 3 mg (one actuation) into a single nostril; if no response, may repeat in 15 minutes using a new intranasal device. (Patient not taking: Reported on 11/1/2022) 2 each 1    glucosamine-chondroitin 500-400 mg tablet Take 1 tablet by mouth once daily. 90 tablet 0    HYDROcodone-acetaminophen (NORCO) 5-325 mg per tablet Take 1 tablet by mouth every 6 (six) hours as needed for Pain. 16 tablet 0    insulin lispro (HUMALOG U-100 INSULIN) 100 unit/mL injection To use with VGo20 . Administer  Clicks 3 times per day with food. Max TDD of 100 units 30 mL 6    ipratropium (ATROVENT HFA) 17 mcg/actuation inhaler Inhale 2 puffs into the lungs every 6 (six) hours. Rescue (Patient not taking: Reported on 11/1/2022) 12.9 g 1    linaCLOtide (LINZESS) 72 mcg Cap capsule Take 1 capsule (72 mcg total) by mouth before breakfast. 30 capsule 0    lisinopriL 10 MG tablet Take 1 tablet (10 mg total) by mouth 2 (two) times daily. 180 tablet 0    LORazepam (ATIVAN) 0.5 MG tablet       metFORMIN (GLUCOPHAGE-XR) 500 MG ER 24hr tablet Take 1 tablet (500 mg total) by mouth 2 (two) times daily with meals. 180 tablet 2    mirabegron (MYRBETRIQ) 25 mg Tb24 ER tablet TAKE ONE TABLET BY MOUTH DAILY (Patient not taking: Reported on 11/1/2022) 30 tablet 0    orphenadrine (NORFLEX) 100 mg tablet Take 1 tablet (100 mg total) by mouth 2 (two) times daily as needed. 180 tablet 0    oxyCODONE-acetaminophen (PERCOCET) 5-325 mg per tablet Take 1 tablet by mouth every 8 (eight) hours as needed for Pain. (Patient not taking: No sig reported) 20 tablet 0    pantoprazole (PROTONIX) 40 MG tablet Take 1 tablet (40 mg total) by mouth once daily. For stomach- reflux (Patient not taking: Reported on 9/6/2022) 90 tablet 0    polycarbophil (FIBERCON) 625 mg tablet Take 1 tablet (625 mg total) by mouth once daily. (Patient not taking: Reported on 9/6/2022)      rivaroxaban (XARELTO) 10 mg Tab Take 1 tablet (10 mg  total) by mouth daily with dinner or evening meal. (Patient not taking: Reported on 11/1/2022) 15 tablet 0    rosuvastatin (CRESTOR) 20 MG tablet TAKE 1 TABLET BY MOUTH ONCE DAILY NIGHTLY 90 tablet 0    semaglutide (OZEMPIC) 0.25 mg or 0.5 mg(2 mg/1.5 mL) pen injector Inject 0.5 mg into the skin every 7 days. 1 pen 6    traMADoL (ULTRAM) 50 mg tablet Take 1 tablet (50 mg total) by mouth every 6 (six) hours as needed for Pain. 20 tablet 0    traZODone (DESYREL) 100 MG tablet Take 1 tablet (100 mg total) by mouth every evening. For sleep 90 tablet 0    V-GO 20 Leslye USE AS DIRECTED ONCE A DAY 30 each 5     No current facility-administered medications for this visit.        History  Past Medical History:   Diagnosis Date    Allergy     Arthritis     Asthma     Back injuries     two broken bones that healed on own    Cataract     Depression     Diabetes mellitus     Diabetes mellitus type I     GERD (gastroesophageal reflux disease)     Hypercholesteremia     Hypertension     Insomnia     Slow to wake up after anesthesia 2002    Orthoscope of knee     Past Surgical History:   Procedure Laterality Date    anal fissure surgery      BREAST BIOPSY Right     benign    BRONCHOSCOPY N/A 10/22/2019    Procedure: bronch floro , noon;  Surgeon: Pepe Andersen MD;  Location: [facility];  Service: Endoscopy;  Laterality: N/A;    COLONOSCOPY  09/18/2020    COLONOSCOPY N/A 9/18/2020    Procedure: COLONOSCOPY;  Surgeon: Magno Gant MD;  Location: [facility];  Service: Colon and Rectal;  Laterality: N/A;    HYSTERECTOMY      KNEE ARTHROSCOPY W/ DEBRIDEMENT Left     x3    LIPOMA RESECTION      fatty tumor between breast    SHOULDER SURGERY Left 2/11/2016    Dr Burris     TONSILLECTOMY      TOTAL KNEE ARTHROPLASTY Right 3/18/2021    Procedure: ARTHROPLASTY, KNEE, TOTAL;  Surgeon: Rashi De La O MD;  Location: [facility];  Service: Orthopedics;  Laterality: Right;  ADDUCTOR BLOCK    WRIST RECONSTRUCTION Right     tendon     Social  History     Socioeconomic History    Marital status:    Occupational History    Occupation: [employer]     Employer: [employer]   Tobacco Use    Smoking status: Never    Smokeless tobacco: Never   Substance and Sexual Activity    Alcohol use: Yes     Alcohol/week: 1.0 standard drink     Types: 1 Shots of liquor per week     Comment: socially    Drug use: No    Sexual activity: Never     Family History   Problem Relation Age of Onset    Arthritis Mother     Hypertension Mother     Arthritis Father     Diabetes Father     Hypertension Father     Breast cancer Sister     Cancer Sister         breast cancer     Breast cancer Paternal Aunt         Allergies  Review of patient's allergies indicates:   Allergen Reactions    Aspirin Hives    Aleve [naproxen sodium] Hives    Iodinated contrast media     Iodine Other (See Comments)     WHEN INJECTED- pt  states she coded    Pcn [penicillins] Rash       Review of Systems   Review of Systems   Constitutional: Negative for fever.   HENT:  Negative for nosebleeds.    Eyes:  Negative for visual disturbance.   Cardiovascular:  Negative for chest pain, claudication, dyspnea on exertion, palpitations and syncope.   Respiratory:  Negative for cough, hemoptysis and wheezing.    Endocrine: Negative for cold intolerance, heat intolerance, polyphagia and polyuria.   Hematologic/Lymphatic: Negative for bleeding problem.   Skin:  Negative for rash.   Musculoskeletal:  Negative for myalgias.   Gastrointestinal:  Negative for hematemesis, hematochezia, nausea and vomiting.   Genitourinary:  Negative for dysuria.   Neurological:  Negative for focal weakness and sensory change.   Psychiatric/Behavioral:  Negative for altered mental status.      Physical Exam  There were no vitals filed for this visit.  Wt Readings from Last 1 Encounters:   11/03/22 79.2 kg (174 lb 7.9 oz)     Wt Readings from Last 3 Encounters:   11/03/22 79.2 kg (174 lb 7.9 oz)   11/01/22 78.9  kg (174 lb)   10/03/22 74.4 kg (164 lb)     Temp Readings from Last 3 Encounters:   11/01/22 97 °F (36.1 °C) (Temporal)   10/03/22 98 °F (36.7 °C) (Temporal)   09/06/22 97.3 °F (36.3 °C) (Temporal)     BP Readings from Last 3 Encounters:   11/03/22 126/78   11/01/22 103/66   10/03/22 99/67     Pulse Readings from Last 3 Encounters:   11/03/22 76   11/01/22 77   10/03/22 67       Physical Exam  Constitutional:       General: She is not in acute distress.  HENT:      Head: Normocephalic and atraumatic.      Mouth/Throat:      Mouth: Mucous membranes are moist.   Eyes:      Extraocular Movements: Extraocular movements intact.      Pupils: Pupils are equal, round, and reactive to light.   Neck:      Vascular: No carotid bruit or JVD.   Cardiovascular:      Rate and Rhythm: Normal rate and regular rhythm.      Heart sounds: No murmur heard.    No friction rub. No gallop.   Pulmonary:      Effort: Pulmonary effort is normal.      Breath sounds: Normal breath sounds.   Abdominal:      Tenderness: There is no abdominal tenderness. There is no guarding or rebound.   Musculoskeletal:      Right lower leg: No edema.      Left lower leg: No edema.   Skin:     General: Skin is warm and dry.      Capillary Refill: Capillary refill takes less than 2 seconds.   Neurological:      General: No focal deficit present.      Mental Status: She is alert.   Psychiatric:         Mood and Affect: Mood normal.       Labs  Lab Visit on 11/03/2022   Component Date Value Ref Range Status    WBC 11/03/2022 5.77  3.90 - 12.70 K/uL Final    RBC 11/03/2022 4.59  4.00 - 5.40 M/uL Final    Hemoglobin 11/03/2022 12.8  12.0 - 16.0 g/dL Final    Hematocrit 11/03/2022 39.9  37.0 - 48.5 % Final    MCV 11/03/2022 87  82 - 98 fL Final    MCH 11/03/2022 27.9  27.0 - 31.0 pg Final    MCHC 11/03/2022 32.1  32.0 - 36.0 g/dL Final    RDW 11/03/2022 13.1  11.5 - 14.5 % Final    Platelets 11/03/2022 327  150 - 450 K/uL Final    MPV 11/03/2022 9.4  9.2 - 12.9 fL  Final    Immature Granulocytes 11/03/2022 0.2  0.0 - 0.5 % Final    Gran # (ANC) 11/03/2022 3.3  1.8 - 7.7 K/uL Final    Immature Grans (Abs) 11/03/2022 0.01  0.00 - 0.04 K/uL Final    Comment: Mild elevation in immature granulocytes is non specific and   can be seen in a variety of conditions including stress response,   acute inflammation, trauma and pregnancy. Correlation with other   laboratory and clinical findings is essential.      Lymph # 11/03/2022 1.8  1.0 - 4.8 K/uL Final    Mono # 11/03/2022 0.5  0.3 - 1.0 K/uL Final    Eos # 11/03/2022 0.2  0.0 - 0.5 K/uL Final    Baso # 11/03/2022 0.05  0.00 - 0.20 K/uL Final    nRBC 11/03/2022 0  0 /100 WBC Final    Gran % 11/03/2022 57.0  38.0 - 73.0 % Final    Lymph % 11/03/2022 30.8  18.0 - 48.0 % Final    Mono % 11/03/2022 7.8  4.0 - 15.0 % Final    Eosinophil % 11/03/2022 3.3  0.0 - 8.0 % Final    Basophil % 11/03/2022 0.9  0.0 - 1.9 % Final    Differential Method 11/03/2022 Automated   Final    aPTT 11/03/2022 23.3  21.0 - 32.0 sec Final    Comment: aPTT therapeutic range = 39-69 seconds  LOT^050^APTT FSL^[id number]      Prothrombin Time 11/03/2022 10.5  9.0 - 12.5 sec Final    INR 11/03/2022 1.0  0.8 - 1.2 Final    Comment: Coumadin Therapy:  2.0 - 3.0 for INR for all indicators except mechanical heart valves  and antiphospholipid syndromes which should use 2.5 - 3.5.  LOT^040^PT Inn^[id number]     Lab Visit on 09/07/2022   Component Date Value Ref Range Status    aPTT 09/07/2022 23.4  21.0 - 32.0 sec Final    Comment: aPTT therapeutic range = 39-69 seconds  LOT^050^APTT FSL^[id number]      Prothrombin Time 09/07/2022 10.2  9.0 - 12.5 sec Final    INR 09/07/2022 0.9  0.8 - 1.2 Final    Comment: Coumadin Therapy:  2.0 - 3.0 for INR for all indicators except mechanical heart valves  and antiphospholipid syndromes which should use 2.5 - 3.5.  LOT^040^PT Inn^[id number]     Lab Visit on 08/08/2022   Component Date Value Ref Range Status    Specimen UA 08/08/2022 Urine, Clean Catch    Final    Color, UA 08/08/2022 Yellow  Yellow, Straw, Luz Maria Final    Appearance, UA 08/08/2022 Clear  Clear Final    pH, UA 08/08/2022 6.0  5.0 - 8.0 Final    Specific Gravity, UA 08/08/2022 >=1.030 (A)  1.005 - 1.030 Final    Protein, UA 08/08/2022 Negative  Negative Final    Comment: Recommend a 24 hour urine protein or a urine   protein/creatinine ratio if globulin induced proteinuria is  clinically suspected.      Glucose, UA 08/08/2022 Negative  Negative Final    Ketones, UA 08/08/2022 Negative  Negative Final    Bilirubin (UA) 08/08/2022 Negative  Negative Final    Occult Blood UA 08/08/2022 1+ (A)  Negative Final    Nitrite, UA 08/08/2022 Negative  Negative Final    Urobilinogen, UA 08/08/2022 Negative  Negative EU/dL Final    Leukocytes, UA 08/08/2022 Negative  Negative Final    Hemoglobin A1C 08/08/2022 6.7 (H)  4.0 - 5.6 % Final    Comment: ADA Screening Guidelines:  5.7-6.4%  Consistent with prediabetes  >or=6.5%  Consistent with diabetes    High levels of fetal hemoglobin interfere with the HbA1C  assay. Heterozygous hemoglobin variants (HbS, HgC, etc)do  not significantly interfere with this assay.   However, presence of multiple variants may affect accuracy.      Estimated Avg Glucose 08/08/2022 146 (H)  68 - 131 mg/dL Final    RBC, UA 08/08/2022 5 (H)  0 - 4 /hpf Final    WBC, UA 08/08/2022 6 (H)  0 - 5 /hpf Final    Bacteria 08/08/2022 Few (A)  None-Occ /hpf Final    Ca Oxalate [place], UA 08/08/2022 Moderate  None-Moderate Final    Microscopic Comment 08/08/2022 SEE COMMENT   Final    Comment: Other formed elements not mentioned in the report are not   present in the microscopic examination.      Lab Visit on 05/26/2022   Component Date Value Ref Range Status    WBC 05/26/2022 7.89  3.90 - 12.70 K/uL Final    RBC 05/26/2022 4.41  4.00 - 5.40 M/uL Final    Hemoglobin 05/26/2022 12.7  12.0 - 16.0 g/dL Final    Hematocrit 05/26/2022 39.8  37.0 - 48.5 % Final    MCV 05/26/2022 90  82 - 98 fL Final    MCH  05/26/2022 28.8  27.0 - 31.0 pg Final    MCHC 05/26/2022 31.9 (L)  32.0 - 36.0 g/dL Final    RDW 05/26/2022 12.9  11.5 - 14.5 % Final    Platelets 05/26/2022 283  150 - 450 K/uL Final    MPV 05/26/2022 9.1 (L)  9.2 - 12.9 fL Final    Immature Granulocytes 05/26/2022 0.3  0.0 - 0.5 % Final    Gran # (ANC) 05/26/2022 6.5  1.8 - 7.7 K/uL Final    Immature Grans (Abs) 05/26/2022 0.02  0.00 - 0.04 K/uL Final    Comment: Mild elevation in immature granulocytes is non specific and   can be seen in a variety of conditions including stress response,   acute inflammation, trauma and pregnancy. Correlation with other   laboratory and clinical findings is essential.      Lymph # 05/26/2022 0.8 (L)  1.0 - 4.8 K/uL Final    Mono # 05/26/2022 0.5  0.3 - 1.0 K/uL Final    Eos # 05/26/2022 0.1  0.0 - 0.5 K/uL Final    Baso # 05/26/2022 0.02  0.00 - 0.20 K/uL Final    nRBC 05/26/2022 0  0 /100 WBC Final    Gran % 05/26/2022 81.7 (H)  38.0 - 73.0 % Final    Lymph % 05/26/2022 9.9 (L)  18.0 - 48.0 % Final    Mono % 05/26/2022 6.0  4.0 - 15.0 % Final    Eosinophil % 05/26/2022 1.8  0.0 - 8.0 % Final    Basophil % 05/26/2022 0.3  0.0 - 1.9 % Final    Differential Method 05/26/2022 Automated   Final    Sodium 05/26/2022 141  136 - 145 mmol/L Final    Potassium 05/26/2022 4.1  3.5 - 5.1 mmol/L Final    Chloride 05/26/2022 106  95 - 110 mmol/L Final    CO2 05/26/2022 22 (L)  23 - 29 mmol/L Final    Glucose 05/26/2022 103  70 - 110 mg/dL Final    BUN 05/26/2022 19  8 - 23 mg/dL Final    Creatinine 05/26/2022 0.8  0.5 - 1.4 mg/dL Final    Calcium 05/26/2022 10.0  8.7 - 10.5 mg/dL Final    Total Protein 05/26/2022 7.2  6.0 - 8.4 g/dL Final    Albumin 05/26/2022 4.2  3.5 - 5.2 g/dL Final    Total Bilirubin 05/26/2022 0.6  0.1 - 1.0 mg/dL Final    Comment: For infants and newborns, interpretation of results should be based  on gestational age, weight and in agreement with clinical  observations.    Premature Infant recommended reference  ranges:  Up to 24 hours.............<8.0 mg/dL  Up to 48 hours............<12.0 mg/dL  3-5 days..................<15.0 mg/dL  6-29 days.................<15.0 mg/dL      Alkaline Phosphatase 05/26/2022 81  55 - 135 U/L Final    AST 05/26/2022 17  10 - 40 U/L Final    ALT 05/26/2022 7 (L)  10 - 44 U/L Final    Anion Gap 05/26/2022 13  8 - 16 mmol/L Final    eGFR if African American 05/26/2022 >60.0  >60 mL/min/1.73 m^2 Final    eGFR if non African American 05/26/2022 >60.0  >60 mL/min/1.73 m^2 Final    Comment: Calculation used to obtain the estimated glomerular filtration  rate (eGFR) is the CKD-EPI equation.       Cholesterol 05/26/2022 126  120 - 199 mg/dL Final    Comment: The National Cholesterol Education Program (NCEP) has set the  following guidelines (reference ranges) for Cholesterol:  Optimal.....................<200 mg/dL  Borderline High.............200-239 mg/dL  High........................> or = 240 mg/dL      Triglycerides 05/26/2022 118  30 - 150 mg/dL Final    Comment: The National Cholesterol Education Program (NCEP) has set the  following guidelines (reference values) for triglycerides:  Normal......................<150 mg/dL  Borderline High.............150-199 mg/dL  High........................200-499 mg/dL      HDL 05/26/2022 42  40 - 75 mg/dL Final    Comment: The National Cholesterol Education Program (NCEP) has set the  following guidelines (reference values) for HDL Cholesterol:  Low...............<40 mg/dL  Optimal...........>60 mg/dL      LDL Cholesterol 05/26/2022 60.4 (L)  63.0 - 159.0 mg/dL Final    Comment: The National Cholesterol Education Program (NCEP) has set the  following guidelines (reference values) for LDL Cholesterol:  Optimal.......................<130 mg/dL  Borderline High...............130-159 mg/dL  High..........................160-189 mg/dL  Very High.....................>190 mg/dL      HDL/Cholesterol Ratio 05/26/2022 33.3  20.0 - 50.0 % Final    Total  Cholesterol/HDL Ratio 05/26/2022 3.0  2.0 - 5.0 Final    Non-HDL Cholesterol 05/26/2022 84  mg/dL Final    Comment: Risk category and Non-HDL cholesterol goals:  Coronary heart disease (CHD)or equivalent (10-year risk of CHD >20%):  Non-HDL cholesterol goal     <130 mg/dL  Two or more CHD risk factors and 10-year risk of CHD <= 20%:  Non-HDL cholesterol goal     <160 mg/dL  0 to 1 CHD risk factor:  Non-HDL cholesterol goal     <190 mg/dL      TSH 05/26/2022 1.462  0.400 - 4.000 uIU/mL Final    Vit D, 25-Hydroxy 05/26/2022 28 (L)  30 - 96 ng/mL Final    Comment: Vitamin D deficiency.........<10 ng/mL                              Vitamin D insufficiency......10-29 ng/mL       Vitamin D sufficiency........> or equal to 30 ng/mL  Vitamin D toxicity............>100 ng/mL      Hemoglobin A1C 05/26/2022 6.7 (H)  4.0 - 5.6 % Final    Comment: ADA Screening Guidelines:  5.7-6.4%  Consistent with prediabetes  >or=6.5%  Consistent with diabetes    High levels of fetal hemoglobin interfere with the HbA1C  assay. Heterozygous hemoglobin variants (HbS, HgC, etc)do  not significantly interfere with this assay.   However, presence of multiple variants may affect accuracy.      Estimated Avg Glucose 05/26/2022 146 (H)  68 - 131 mg/dL Final    aPTT 05/26/2022 22.6  21.0 - 32.0 sec Final    Comment: aPTT therapeutic range = 39-69 seconds  LOT^050^APTT FSL^[id number]      Prothrombin Time 05/26/2022 10.3  9.0 - 12.5 sec Final    INR 05/26/2022 0.9  0.8 - 1.2 Final    Comment: Coumadin Therapy:  2.0 - 3.0 for INR for all indicators except mechanical heart valves  and antiphospholipid syndromes which should use 2.5 - 3.5.  LOT^040^PT Inn^[id number]         EKG  NSR NSSTT changes 9/2022    Echo   No results found for this or any previous visit.    Imaging  No results found.    Prior coronary angiogram / intervention:  None    Assessment and Plan    72-year-old female with past medical history obesity, history breast cancer documented in chart  patient denies, thyroid nodule, GERD, asthma/COPD, PN, diabetes, hyperlipidemia, hypertension, coronary atherosclerosis by CT      1. Pre-op evaluation / abnormal EKG  MPI  If low risk proceed with surgery    2. Primary hypertension  Norvasc, lisinopril  Controlled    3. Mixed hyperlipidemia  Statin      Follow Up  1 year or sooner EDEL Chang MD, FACC, RPVI  Interventional Cardiology

## 2022-11-04 ENCOUNTER — TELEPHONE (OUTPATIENT)
Dept: DIABETES | Facility: CLINIC | Age: 72
End: 2022-11-04
Payer: MEDICARE

## 2022-11-10 ENCOUNTER — TELEPHONE (OUTPATIENT)
Dept: OPHTHALMOLOGY | Facility: CLINIC | Age: 72
End: 2022-11-10
Payer: MEDICARE
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
## 2022-11-10 NOTE — PRE-PROCEDURE INSTRUCTIONS
PreOp Instructions given:   - Verbal medication information (what to hold and what to take)   - NPO guidelines 2400  - Arrival place directions given; time to be given the day before procedure by the   Surgeon's Office 1100 - [facility]  - Bathing with antibacterial soap   - Don't wear any jewelry or bring any valuables AM of surgery   - No makeup or moisturizer to face   - No perfume/cologne, powder, lotions or aftershave   Pt. verbalized understanding.   Pt reports slow to awaken/delayed emergence in the past, but not w/recent procedures.  V-Go-20 - Insulin delivery System - LUQ   Pt instructed to monitor BS prior to arrival to [facility] and notify PreOp RN of placement of device. Pt v/c/u

## 2022-11-11 ENCOUNTER — HOSPITAL ENCOUNTER (OUTPATIENT)
Facility: HOSPITAL | Age: 72
Discharge: HOME | End: 2022-11-11
Attending: OPHTHALMOLOGY | Admitting: OPHTHALMOLOGY
Payer: MEDICARE

## 2022-11-11 VITALS
DIASTOLIC BLOOD PRESSURE: 80 MMHG | OXYGEN SATURATION: 95 % | TEMPERATURE: 98 F | HEIGHT: 61 IN | HEART RATE: 70 BPM | BODY MASS INDEX: 32.47 KG/M2 | RESPIRATION RATE: 18 BRPM | SYSTOLIC BLOOD PRESSURE: 158 MMHG | WEIGHT: 172 LBS

## 2022-11-11 DIAGNOSIS — H02.423 ACQUIRED MYOGENIC PTOSIS OF EYELID, BILATERAL: Primary | ICD-10-CM

## 2022-11-11 LAB — POCT GLUCOSE: 116 MG/DL (ref 70–110)

## 2022-11-11 PROCEDURE — 15823 PR REV UPPER EYELID W EXCESS SKIN: ICD-10-PCS | Mod: 50,,, | Performed by: OPHTHALMOLOGY

## 2022-11-11 PROCEDURE — D9220A PRA ANESTHESIA: ICD-10-PCS | Mod: CRNA,,, | Performed by: STUDENT IN AN ORGANIZED HEALTH CARE EDUCATION/TRAINING PROGRAM

## 2022-11-11 PROCEDURE — 25000003 PHARM REV CODE 250

## 2022-11-11 PROCEDURE — D9220A PRA ANESTHESIA: ICD-10-PCS | Mod: ANES,,, | Performed by: ANESTHESIOLOGY

## 2022-11-11 PROCEDURE — 71000044 HC DOSC ROUTINE RECOVERY FIRST HOUR: Performed by: OPHTHALMOLOGY

## 2022-11-11 PROCEDURE — D9220A PRA ANESTHESIA: Mod: ANES,,, | Performed by: ANESTHESIOLOGY

## 2022-11-11 PROCEDURE — 88342 IMHCHEM/IMCYTCHM 1ST ANTB: CPT | Mod: 26,,, | Performed by: PATHOLOGY

## 2022-11-11 PROCEDURE — 63600175 PHARM REV CODE 636 W HCPCS: Performed by: OPHTHALMOLOGY

## 2022-11-11 PROCEDURE — D9220A PRA ANESTHESIA: Mod: CRNA,,, | Performed by: STUDENT IN AN ORGANIZED HEALTH CARE EDUCATION/TRAINING PROGRAM

## 2022-11-11 PROCEDURE — 88304 TISSUE EXAM BY PATHOLOGIST: CPT | Mod: 26,,, | Performed by: PATHOLOGY

## 2022-11-11 PROCEDURE — 25000003 PHARM REV CODE 250: Performed by: OPHTHALMOLOGY

## 2022-11-11 PROCEDURE — 67908: ICD-10-PCS | Mod: 59,50,, | Performed by: OPHTHALMOLOGY

## 2022-11-11 PROCEDURE — 15823 BLEPHARP UPR EYELID XCSV SKN: CPT | Mod: 50,,, | Performed by: OPHTHALMOLOGY

## 2022-11-11 PROCEDURE — 67908 REPAIR EYELID DEFECT: CPT | Mod: 59,50,, | Performed by: OPHTHALMOLOGY

## 2022-11-11 PROCEDURE — 63600175 PHARM REV CODE 636 W HCPCS: Performed by: STUDENT IN AN ORGANIZED HEALTH CARE EDUCATION/TRAINING PROGRAM

## 2022-11-11 PROCEDURE — 88342 CHG IMMUNOCYTOCHEMISTRY: ICD-10-PCS | Mod: 26,,, | Performed by: PATHOLOGY

## 2022-11-11 PROCEDURE — 71000015 HC POSTOP RECOV 1ST HR: Performed by: OPHTHALMOLOGY

## 2022-11-11 PROCEDURE — 88304 TISSUE EXAM BY PATHOLOGIST: CPT | Performed by: PATHOLOGY

## 2022-11-11 PROCEDURE — 37000009 HC ANESTHESIA EA ADD 15 MINS: Performed by: OPHTHALMOLOGY

## 2022-11-11 PROCEDURE — 71000016 HC POSTOP RECOV ADDL HR: Performed by: OPHTHALMOLOGY

## 2022-11-11 PROCEDURE — 88312 PR  SPECIAL STAINS,GROUP I: ICD-10-PCS | Mod: 26,,, | Performed by: PATHOLOGY

## 2022-11-11 PROCEDURE — 82962 GLUCOSE BLOOD TEST: CPT | Performed by: OPHTHALMOLOGY

## 2022-11-11 PROCEDURE — 36000706: Performed by: OPHTHALMOLOGY

## 2022-11-11 PROCEDURE — 88312 SPECIAL STAINS GROUP 1: CPT | Mod: 26,,, | Performed by: PATHOLOGY

## 2022-11-11 PROCEDURE — 36000707: Performed by: OPHTHALMOLOGY

## 2022-11-11 PROCEDURE — 88342 IMHCHEM/IMCYTCHM 1ST ANTB: CPT | Performed by: PATHOLOGY

## 2022-11-11 PROCEDURE — 63600175 PHARM REV CODE 636 W HCPCS: Performed by: ANESTHESIOLOGY

## 2022-11-11 PROCEDURE — 25000003 PHARM REV CODE 250: Performed by: SURGERY

## 2022-11-11 PROCEDURE — 88304 PR  SURG PATH,LEVEL III: ICD-10-PCS | Mod: 26,,, | Performed by: PATHOLOGY

## 2022-11-11 PROCEDURE — 88312 SPECIAL STAINS GROUP 1: CPT | Performed by: PATHOLOGY

## 2022-11-11 PROCEDURE — 25000003 PHARM REV CODE 250: Performed by: STUDENT IN AN ORGANIZED HEALTH CARE EDUCATION/TRAINING PROGRAM

## 2022-11-11 PROCEDURE — 37000008 HC ANESTHESIA 1ST 15 MINUTES: Performed by: OPHTHALMOLOGY

## 2022-11-11 RX ORDER — MIDAZOLAM HYDROCHLORIDE 5 MG/ML
INJECTION INTRAMUSCULAR; INTRAVENOUS
Status: DISCONTINUED | OUTPATIENT
Start: 2022-11-11 | End: 2022-11-11

## 2022-11-11 RX ORDER — TOBRAMYCIN AND DEXAMETHASONE 3; 1 MG/ML; MG/ML
SUSPENSION/ DROPS OPHTHALMIC
Status: DISCONTINUED
Start: 2022-11-11 | End: 2022-11-11 | Stop reason: HOSPADM

## 2022-11-11 RX ORDER — SODIUM CHLORIDE 0.9 % (FLUSH) 0.9 %
10 SYRINGE (ML) INJECTION
Status: DISCONTINUED | OUTPATIENT
Start: 2022-11-11 | End: 2022-11-11 | Stop reason: HOSPADM

## 2022-11-11 RX ORDER — DEXAMETHASONE SODIUM PHOSPHATE 4 MG/ML
INJECTION, SOLUTION INTRA-ARTICULAR; INTRALESIONAL; INTRAMUSCULAR; INTRAVENOUS; SOFT TISSUE
Status: DISCONTINUED | OUTPATIENT
Start: 2022-11-11 | End: 2022-11-11

## 2022-11-11 RX ORDER — TOBRAMYCIN 3 MG/ML
SOLUTION/ DROPS OPHTHALMIC
Status: DISCONTINUED | OUTPATIENT
Start: 2022-11-11 | End: 2022-11-11 | Stop reason: HOSPADM

## 2022-11-11 RX ORDER — PROPOFOL 10 MG/ML
VIAL (ML) INTRAVENOUS
Status: DISCONTINUED | OUTPATIENT
Start: 2022-11-11 | End: 2022-11-11

## 2022-11-11 RX ORDER — FENTANYL CITRATE 50 UG/ML
INJECTION, SOLUTION INTRAMUSCULAR; INTRAVENOUS
Status: DISCONTINUED | OUTPATIENT
Start: 2022-11-11 | End: 2022-11-11

## 2022-11-11 RX ORDER — LIDOCAINE HYDROCHLORIDE 20 MG/ML
INJECTION INTRAVENOUS
Status: DISCONTINUED | OUTPATIENT
Start: 2022-11-11 | End: 2022-11-11

## 2022-11-11 RX ORDER — TETRACAINE HYDROCHLORIDE 5 MG/ML
1 SOLUTION OPHTHALMIC ONCE
Status: ACTIVE | OUTPATIENT
Start: 2022-11-11

## 2022-11-11 RX ORDER — ONDANSETRON 2 MG/ML
INJECTION INTRAMUSCULAR; INTRAVENOUS
Status: DISCONTINUED | OUTPATIENT
Start: 2022-11-11 | End: 2022-11-11

## 2022-11-11 RX ORDER — FENTANYL CITRATE 50 UG/ML
25 INJECTION, SOLUTION INTRAMUSCULAR; INTRAVENOUS EVERY 5 MIN PRN
Status: COMPLETED | OUTPATIENT
Start: 2022-11-11 | End: 2022-11-11

## 2022-11-11 RX ORDER — ACETAMINOPHEN 500 MG
1000 TABLET ORAL
Status: COMPLETED | OUTPATIENT
Start: 2022-11-11 | End: 2022-11-11

## 2022-11-11 RX ORDER — HYDROCODONE BITARTRATE AND ACETAMINOPHEN 5; 325 MG/1; MG/1
1 TABLET ORAL EVERY 6 HOURS PRN
Qty: 16 TABLET | Refills: 0 | Status: SHIPPED | OUTPATIENT
Start: 2022-11-11 | End: 2022-11-21 | Stop reason: CLARIF

## 2022-11-11 RX ORDER — CLINDAMYCIN PHOSPHATE 900 MG/50ML
INJECTION, SOLUTION INTRAVENOUS
Status: DISCONTINUED | OUTPATIENT
Start: 2022-11-11 | End: 2022-11-11

## 2022-11-11 RX ORDER — LIDOCAINE HYDROCHLORIDE 10 MG/ML
1 INJECTION, SOLUTION EPIDURAL; INFILTRATION; INTRACAUDAL; PERINEURAL ONCE
Status: ACTIVE | OUTPATIENT
Start: 2022-11-11

## 2022-11-11 RX ORDER — BUPIVACAINE HYDROCHLORIDE 5 MG/ML
INJECTION, SOLUTION EPIDURAL; INTRACAUDAL
Status: DISCONTINUED | OUTPATIENT
Start: 2022-11-11 | End: 2022-11-11 | Stop reason: HOSPADM

## 2022-11-11 RX ORDER — LIDOCAINE HCL/EPINEPHRINE/PF 2%-1:200K
VIAL (ML) INJECTION
Status: DISCONTINUED | OUTPATIENT
Start: 2022-11-11 | End: 2022-11-11 | Stop reason: HOSPADM

## 2022-11-11 RX ORDER — NEOMYCIN SULFATE, POLYMYXIN B SULFATE, AND DEXAMETHASONE 3.5; 10000; 1 MG/G; [USP'U]/G; MG/G
OINTMENT OPHTHALMIC EVERY 8 HOURS
Qty: 3.5 G | Refills: 1 | Status: ON HOLD | OUTPATIENT
Start: 2022-11-11 | End: 2023-10-31 | Stop reason: ALTCHOICE

## 2022-11-11 RX ORDER — HYDROCODONE BITARTRATE AND ACETAMINOPHEN 5; 325 MG/1; MG/1
1 TABLET ORAL EVERY 4 HOURS PRN
Status: DISCONTINUED | OUTPATIENT
Start: 2022-11-11 | End: 2022-11-11 | Stop reason: HOSPADM

## 2022-11-11 RX ADMIN — FENTANYL CITRATE 25 MCG: 0.05 INJECTION, SOLUTION INTRAMUSCULAR; INTRAVENOUS at 06:11

## 2022-11-11 RX ADMIN — HYDROCODONE BITARTRATE AND ACETAMINOPHEN 1 TABLET: 5; 325 TABLET ORAL at 06:11

## 2022-11-11 RX ADMIN — DEXAMETHASONE SODIUM PHOSPHATE 4 MG: 4 INJECTION INTRA-ARTICULAR; INTRALESIONAL; INTRAMUSCULAR; INTRAVENOUS; SOFT TISSUE at 04:11

## 2022-11-11 RX ADMIN — FENTANYL CITRATE 25 MCG: 50 INJECTION, SOLUTION INTRAMUSCULAR; INTRAVENOUS at 04:11

## 2022-11-11 RX ADMIN — FENTANYL CITRATE 25 MCG: 50 INJECTION, SOLUTION INTRAMUSCULAR; INTRAVENOUS at 05:11

## 2022-11-11 RX ADMIN — MIDAZOLAM HYDROCHLORIDE 2 MG: 5 INJECTION, SOLUTION INTRAMUSCULAR; INTRAVENOUS at 03:11

## 2022-11-11 RX ADMIN — LIDOCAINE HYDROCHLORIDE 100 MG: 20 INJECTION INTRAVENOUS at 04:11

## 2022-11-11 RX ADMIN — ONDANSETRON 4 MG: 2 INJECTION INTRAMUSCULAR; INTRAVENOUS at 05:11

## 2022-11-11 RX ADMIN — SODIUM CHLORIDE: 9 INJECTION, SOLUTION INTRAVENOUS at 03:11

## 2022-11-11 RX ADMIN — TACROLIMUS 900 MG: 0.5 CAPSULE ORAL at 05:11

## 2022-11-11 RX ADMIN — PROPOFOL 150 MG: 10 INJECTION, EMULSION INTRAVENOUS at 04:11

## 2022-11-11 RX ADMIN — FENTANYL CITRATE 50 MCG: 50 INJECTION, SOLUTION INTRAMUSCULAR; INTRAVENOUS at 04:11

## 2022-11-11 RX ADMIN — ACETAMINOPHEN 1000 MG: 500 TABLET ORAL at 11:11

## 2022-11-11 NOTE — DISCHARGE INSTRUCTIONS
Knee Athroplasty Discharge Instructions    Pain:   After surgery your knee will be sore. The knee will likely have been injected with a numbing medicine (Exparel) prior to completion of surgery for pain control. This is indicated on a green bracelet that you will continue to wear for 4 days after surgery. Ice and elevation will assist with pain control.    Apply ice as much as possible for the first 72 hours. After 72 hours, apply ice for 20minutes after therapy or whenever experiencing pain. Avoid direct skin contact with ice to prevent frostbit.           Elevate the affected leg with the pillow the length of the leg higher than your heart to assist with swelling and pain.  Incision Care:   Some drainage from the incision in the first 72 hours is normal. If drainage is excessive, reinforce dressing and call home health nurse or therapist. Keep incision clean, dry and covered until staples removed. Staples will be removed 14-21 days after surgery .    Activity:                  Perform exercises 2 x day.   You may shower 48 hours after surgery  providing the dressing is waterproof. You can wrap the knee or hip with press n seal saran wrap to assist with keeping the dressing dry while showering. Dr. De La O prefers his patients to use [place] Cast Cover when showering.No tub or hot tub usage for 6 weeks after surgery. Support help is mandatory during showering. If the  dressing becomes wet, replace with a new dressing. Do not leave a wet dressing on.   Wear maninder hose to both extremities  for 3 weeks after surgery .You may remove stockings for 1- 2 hours during the day only.            If your physician orders the CPM machine you are to use it for 2 hours in the am and 2 hours in the pm. This is not to replace your exercise program.    You may need antibiotic coverage before dental or minor surgical procedures.  Possible Complication:  Infections: Report these signs and symptoms to your surgeon:  Unexpected redness  around incision  Persistent drainage from wound after 72 hours  Temperature Greater than 101 degrees F  Additional swelling  Pain not controlled with current pain medication  Blood Clot: Report these signs and symptoms to your surgeon:  Unusual pain, unusual warm skin  Red or discolored skin  Swelling in the leg                  Your Surgeon Gave You EXPAREL® (bupivacaine liposome injectable suspension)    To help control your pain after surgery, your surgeon injected EXPAREL into your surgical incision just before the end of the procedure.   EXPAREL is a local analgesic that contains the local anesthetic bupivacaine. Local anesthetics provide pain relief by numbing the tissue  around the surgical site.   EXPAREL is specifically designed to release pain medication over time and can control pain for up to 72 hours.   In addition to EXPAREL, your surgeon may provide other pain medications to control your pain.   Each patient is different and responds differently to painmedication. Depending on how you respond to EXPAREL, you may require less  additional pain medication during your recovery.    Possible Side Effects    Side effects can occur with any medication and it is important not to ignore anything you may be experiencing. Some patients who  received EXPAREL experienced nausea, vomiting, or constipation. Rarely, patients who receive bupivacaine (the active ingredient in  EXPAREL) have experienced numbness and tingling in their mouth or lips, lightheadedness, or anxiety. Speak with your doctor right  away if you think you may be experiencing any of these sensations, or if you have other questions regarding possible side effects.    Your Recovery    When your pain is under control, your body can better focus on healing. This is not the time to test your pain tolerance, or grin and  bear it.Work with your surgeon and nurse to make your recovery as speedy and pain-free as possible.   Follow the post-op orders your  nurse gave you.   Eat a healthy diet and drink plenty of water. Surgery stresses your body; your body responds by needing more energy to heal.      Important Information About EXPAREL  Products that contain bupivacaine, like EXPAREL, may cause a temporary loss of  sensation or the ability to move in the area where bupivacaine was injected.    Date Administered: 11/28/22  Time Administered: 2:00pm    Other Forms of Bupivicaine should not be administered within 96hrs following administration of EXPAREL.    [facility] Care Cube Instructions

## 2022-11-11 NOTE — TRANSFER OF CARE
"Anesthesia Transfer of Care Note    Patient: Skip Curran    Procedure(s) Performed: Procedure(s) (LRB):  REPAIR, BLEPHAROPTOSIS (Bilateral)    Patient location: PACU    Anesthesia Type: general    Transport from OR: Transported from OR on 6-10 L/min O2 by face mask with adequate spontaneous ventilation    Post pain: adequate analgesia    Post assessment: no apparent anesthetic complications    Post vital signs: stable    Level of consciousness: awake    Nausea/Vomiting: no nausea/vomiting    Complications: none    Transfer of care protocol was followed      Last vitals:   Visit Vitals  BP (!) 154/74 (BP Location: Left arm, Patient Position: Lying)   Pulse 76   Temp 36.9 °C (98.4 °F) (Temporal)   Resp 16   Ht 5' 1" (1.549 m)   Wt 78 kg (172 lb)   SpO2 100%   Breastfeeding No   BMI 32.50 kg/m²     "

## 2022-11-11 NOTE — INTERVAL H&P NOTE
No interval change in h and p. Proceed to OR as planned.    PE:    HEENT: Normocephalic, atraumatic  CV: RRR nl s1 and s2  Chest: CTA-B  Abd: s/nt/nd  Ext: warm, perfused

## 2022-11-11 NOTE — OP NOTE
11/11/22  Attending: Francheska Arechiga  Resident: none   Pre-op Dx: bilateral mechanical ptosis, bilateral acquired myogenic ptosis  Post-op Dx: same   Procedure: Bilateral 8/10 mm mmcr  Anesthesia: Local and General 1% lidocaine with epinephrine, 0.5% marcaine, and vitrase  Ebl: less than 5 cc  Specimens: none  Complications: none      Indications for surgery: 72 y.o. female with significant impairment of superior visual fields. Informed consent obtained after extensive risks/benefits/alternatives were discussed with the patient including but not limited to pain, bleeding, infection, ocular injury, loss of the eye, asymmetry, need for revision in future, scarring.  Alternatives such as waiting were discussed.  All questions were answered.       The eyelids were marked with a marking pen within the eyelid crease and from the brow 10 mm medially, centrally, and laterally. Local anesthetic was injected into both upper eyelids.The patient was prepped and draped in the usual sterile manner for ophthalmic plastic surgery. Corneal shields were placed in each eye. Attention was first placed to the right eyelid. A 4-0 Silk traction suture was placed into the eyelid. A #15 blade was used to incise the marked skin and then raise and remove the blepharoplasty skin, taking care to preserve the underlying orbicularis oculi. Hemostasis was achieved with bovie electrocautery. Sharp Delia scissor were then used to make a small incision in the medial orbicularis, dissecting down into the medial upper fat pad. Forman scissors were used to enlarge the space. Monopolar cautery was used to remove the superior septal fibers releasing the medial fat pad. A small pedicle of the medial fat pad was exposed, and electrocautery was used to excise this pedicle at its base. After hemostasis was ensured, a 6-0 prolene running suture was used to close the skin of the incision. This procedure was repeated in its entirety on the left upper eyelid. The  corneal shields were removed and traction sutures cut and removed.     Attention was directed to the right side. A desmarres retractor was used to melanie the eyelid.  A cotton-tipped applicator was used to dry off the conjunctiva, and a marking pen was used to delineate 4 mm from the superior aspect of the tarsus medially, centrally, and laterally.  4 mm was also marked from the central jose superiorly. A 4-0 silk was passed through each of the markings through douglasss muscle and conjunctiva. The sutures were tied into two different bundles with equal tension. Both bundles were elevated with equal tension while the ptosis clamp was applied. A 6-0 Prolene on P-3 suture was placed through the skin and exiting beneath the clamp at the lateral most aspect into conjunctiva.  The suture was run in horizontal mattress fashion along the clamp and exited out through the eyelid skin.  The two ends of the running suture were held on tension toward the top of the patients head. A 15 blade was used to excise the excess douglasss muscle and conjunctiva within the clamp.  A desmarres was used to melanie the eyelid, and a silk suture fragment was removed. The suture was tied lightly on the eyelid. Tobradex edwar was applied to the suture and in the eye.  The procedure was repeated for the left eye; however, the markings were measured at 5 mm from the tarsus in addition to the central jose.   The patient tolerated the procedure well.  There were no complications. The patient was wheeled to recovery in stable fashion. The patient was monitored and then discharged home in stable fashion.

## 2022-11-11 NOTE — ANESTHESIA PROCEDURE NOTES
Intubation    Date/Time: 11/11/2022 4:08 PM  Performed by: Candace Cooper CRNA  Authorized by: Ashwin Hill MD     Intubation:     Induction:  Intravenous    Intubated:  Postinduction    Mask Ventilation:  Not attempted    Attempts:  1    Attempted By:  CRNA    Difficult Airway Encountered?: No      Complications:  None    Airway Device:  Supraglottic airway/LMA    Airway Device Size:  3.5    Secured at:  The lips    Placement Verified By:  Capnometry    Complicating Factors:  None    Findings Post-Intubation:  BS equal bilateral and atraumatic/condition of teeth unchanged

## 2022-11-11 NOTE — BRIEF OP NOTE
Attending: Francheska Arechiga  Resident: none   Pre-op Dx: bilateral mechanical ptosis, bilateral acquired myogenic ptosis  Post-op Dx: same   Procedure: bilateral blepharoplasty and bilateral mmcr  Anesthesia: Local and General 1% lidocaine with epinephrine, 0.5% marcaine, and vitrase  Ebl: less than 5 cc  Specimens: none  Complications: none

## 2022-11-15 NOTE — DISCHARGE SUMMARY
Discharge Note  Short Stay      SUMMARY     Admit Date: 11/11/2022    Attending Physician: Francheska Arechiga MD    Discharge Physician: Francheska Arechiga MD    Discharge Date: 11/15/2022 4:44 PM    Final Diagnosis: Post-Op Diagnosis Codes:     * Mechanical ptosis, acquired, bilateral [H02.413]     * Acquired myogenic ptosis of eyelid, bilateral [H02.423]    Hospital Course: The patient underwent uncomplicated eyelid surgery under general anesthesia and was discharged after recovery to be followed as an outpatient in the clinic.    Disposition: discharged home    Patient Instructions:   Discharge Medication List as of 11/11/2022  6:39 PM        START taking these medications    Details   !! HYDROcodone-acetaminophen (NORCO) 5-325 mg per tablet Take 1 tablet by mouth every 6 (six) hours as needed for Pain., Starting Tue 6/21/2022, Normal      !! HYDROcodone-acetaminophen (NORCO) 5-325 mg per tablet Take 1 tablet by mouth every 6 (six) hours as needed for Pain., Starting Fri 11/11/2022, Print      neomycin-polymyxin-dexamethasone (MAXITROL) 3.5 mg/g-10,000 unit/g-0.1 % Oint Place into both eyes every 8 (eight) hours., Starting Fri 11/11/2022, Normal       !! - Potential duplicate medications found. Please discuss with provider.        CONTINUE these medications which have NOT CHANGED    Details   albuterol (ACCUNEB) 0.63 mg/3 mL Nebu Take 3 mLs (0.63 mg total) by nebulization every 6 (six) hours as needed. Rescue, Starting Fri 2/5/2021, Normal      amLODIPine (NORVASC) 10 MG tablet TAKE ONE TABLET BY MOUTH DAILY, Normal      blood sugar diagnostic Strp 1 strip by Misc.(Non-Drug; Combo Route) route 3 (three) times daily. Freestyle Lite strips, Starting Fri 12/17/2021, Normal      calcium carbonate-vitamin D3 (CALCIUM 600 + D,3,) 600 mg calcium- 200 unit Cap Take 600 mg by mouth every evening., Starting Mon 8/20/2018, Until Tue 5/31/2022, Normal      calcium phosphate-melatonin 250-1.5 mg Chew Take by mouth., Historical Med       estradioL (ESTRACE) 0.01 % (0.1 mg/gram) vaginal cream Place 1 g vaginally every Mon, Wed, Fri. First 2 weeks every day Next 2 weeks every other day  thereafter M,W, F, Starting Fri 7/30/2021, Until Sat 7/30/2022, Normal      fenofibrate (TRICOR) 145 MG tablet Take 1 tablet (145 mg total) by mouth once daily., Starting Tue 8/2/2022, Normal      fluticasone-salmeterol diskus inhaler 250-50 mcg INHALE 1 DOSE INTO THE LUNGS TWICE DAILY, Normal      FLUZONE HIGHDOSE QUAD 20-21  mcg/0.7 mL Syrg PHARMACIST ADMINISTERED IMMUNIZATION ADMINISTERED AT TIME OF DISPENSING, Historical Med      gabapentin (NEURONTIN) 300 MG capsule Take 1 capsule (300 mg total) by mouth 2 (two) times daily., Starting Mon 8/29/2022, Normal      glucagon (BAQSIMI) 3 mg/actuation Spry 3 mg (one actuation) into a single nostril; if no response, may repeat in 15 minutes using a new intranasal device., Normal      glucosamine-chondroitin 500-400 mg tablet Take 1 tablet by mouth once daily., Starting 11/24/2015, Until Discontinued, Normal      insulin lispro (HUMALOG U-100 INSULIN) 100 unit/mL injection To use with VGo20 . Administer  Clicks 3 times per day with food. Max TDD of 100 units, Normal      ipratropium (ATROVENT HFA) 17 mcg/actuation inhaler Inhale 2 puffs into the lungs every 6 (six) hours. Rescue, Starting Fri 2/5/2021, Normal      lisinopriL 10 MG tablet Take 1 tablet (10 mg total) by mouth 2 (two) times daily., Starting Tue 8/2/2022, Normal      LORazepam (ATIVAN) 0.5 MG tablet Starting Tue 10/20/2020, Historical Med      metFORMIN (GLUCOPHAGE-XR) 500 MG ER 24hr tablet Take 1 tablet (500 mg total) by mouth 2 (two) times daily with meals., Starting Tue 8/2/2022, Until Tue 10/31/2023, Normal      mirabegron (MYRBETRIQ) 25 mg Tb24 ER tablet TAKE ONE TABLET BY MOUTH DAILY, Normal      orphenadrine (NORFLEX) 100 mg tablet Take 1 tablet (100 mg total) by mouth 2 (two) times daily as needed., Starting Mon 10/3/2022, Normal       oxyCODONE-acetaminophen (PERCOCET) 5-325 mg per tablet Take 1 tablet by mouth every 8 (eight) hours as needed for Pain., Starting Tue 10/26/2021, Print      polycarbophil (FIBERCON) 625 mg tablet Take 1 tablet (625 mg total) by mouth once daily., Starting Tue 8/3/2021, OTC      rivaroxaban (XARELTO) 10 mg Tab Take 1 tablet (10 mg total) by mouth daily with dinner or evening meal., Starting Fri 3/19/2021, Normal      semaglutide (OZEMPIC) 0.25 mg or 0.5 mg(2 mg/1.5 mL) pen injector Inject 0.5 mg into the skin every 7 days., Starting Tue 8/2/2022, Normal      traMADoL (ULTRAM) 50 mg tablet Take 1 tablet (50 mg total) by mouth every 6 (six) hours as needed for Pain., Starting Mon 10/3/2022, Print      traZODone (DESYREL) 100 MG tablet Take 1 tablet (100 mg total) by mouth every evening. For sleep, Starting Tue 8/2/2022, Until Wed 8/2/2023, Normal      V-GO 20 Leslye USE AS DIRECTED ONCE A DAY, Normal      pantoprazole (PROTONIX) 40 MG tablet Take 1 tablet (40 mg total) by mouth once daily. For stomach- reflux, Starting Tue 8/2/2022, Normal      rosuvastatin (CRESTOR) 20 MG tablet TAKE 1 TABLET BY MOUTH ONCE DAILY NIGHTLY, Normal             Discharge Procedure Orders (must include Diet, Follow-up, Activity):   Discharge Procedure Orders (must include Diet, Follow-up, Activity)   Diet general     Ice to affected area     Keep surgical extremity elevated     Call MD for:  severe uncontrolled pain     Call MD for:  persistent nausea and vomiting

## 2022-11-16 NOTE — ANESTHESIA POSTPROCEDURE EVALUATION
Anesthesia Post Evaluation    Patient: Skip Curran    Procedure(s) Performed: Procedure(s) (LRB):  REPAIR, BLEPHAROPTOSIS (Bilateral)    Final Anesthesia Type: general      Patient location during evaluation: PACU  Patient participation: Yes- Able to Participate  Level of consciousness: awake and alert and oriented  Post-procedure vital signs: reviewed and stable  Pain management: adequate  Airway patency: patent  ROSA mitigation strategies: Intraoperative administration of CPAP, nasopharyngeal airway, or oral appliance during sedation, Multimodal analgesia, Extubation while patient is awake, Verification of full reversal of neuromuscular block and Extubation and recovery carried out in lateral, semiupright, or other nonsupine position  PONV status at discharge: No PONV  Anesthetic complications: no      Cardiovascular status: hemodynamically stable  Respiratory status: unassisted  Hydration status: euvolemic  Follow-up not needed.          Vitals Value Taken Time   /80 11/11/22 1902   Temp 36.6 °C (97.8 °F) 11/11/22 1900   Pulse 70 11/11/22 1930   Resp 18 11/11/22 1930   SpO2 95 % 11/11/22 1930         No case tracking events are documented in the log.      Pain/Ewa Score: No data recorded

## 2022-11-17 ENCOUNTER — OFFICE VISIT (OUTPATIENT)
Dept: OPHTHALMOLOGY | Facility: CLINIC | Age: 72
End: 2022-11-17
Payer: MEDICARE

## 2022-11-17 DIAGNOSIS — H02.423 ACQUIRED MYOGENIC PTOSIS OF EYELID, BILATERAL: ICD-10-CM

## 2022-11-17 DIAGNOSIS — Z98.890 POST-OPERATIVE STATE: Primary | ICD-10-CM

## 2022-11-17 DIAGNOSIS — H02.413 MECHANICAL PTOSIS, ACQUIRED, BILATERAL: ICD-10-CM

## 2022-11-17 PROCEDURE — 99024 POSTOP FOLLOW-UP VISIT: CPT | Mod: POP,,, | Performed by: OPHTHALMOLOGY

## 2022-11-17 PROCEDURE — 99999 PR PBB SHADOW E&M-EST. PATIENT-LVL IV: CPT | Mod: PBBFAC,,, | Performed by: OPHTHALMOLOGY

## 2022-11-17 PROCEDURE — 92285 EXTERNAL OCULAR PHOTOGRAPHY: CPT | Mod: PBBFAC | Performed by: OPHTHALMOLOGY

## 2022-11-17 PROCEDURE — 99214 OFFICE O/P EST MOD 30 MIN: CPT | Mod: PBBFAC | Performed by: OPHTHALMOLOGY

## 2022-11-17 PROCEDURE — 99999 PR PBB SHADOW E&M-EST. PATIENT-LVL IV: ICD-10-PCS | Mod: PBBFAC,,, | Performed by: OPHTHALMOLOGY

## 2022-11-17 PROCEDURE — 99024 PR POST-OP FOLLOW-UP VISIT: ICD-10-PCS | Mod: POP,,, | Performed by: OPHTHALMOLOGY

## 2022-11-17 NOTE — PROGRESS NOTES
WILD Curran is a/an 72 y.o. female here for 1 week post-op.    Date of Procedure: 11/11/22  Procedure: Juni Ptosis repair  Oral antibiotics: None  Eye meds: Maxitrol Oint TID OU , Max gtts TID/QID OU         Last edited by Shannon Covington on 11/17/2022  2:35 PM.            Assessment /Plan     For exam results, see Encounter Report.    Post-operative state  -     SUTURE REMOVAL  -     External/Slit Lamp Photography    Mechanical ptosis, acquired, bilateral    Acquired myogenic ptosis of eyelid, bilateral      Patient doing well! Post-operative instructions reviewed. Sutures removed. All questions answered.  Return in 5 weeks prn sooner any worsening of vision/symptoms or any concerns.

## 2022-11-18 ENCOUNTER — TELEPHONE (OUTPATIENT)
Dept: DIABETES | Facility: CLINIC | Age: 72
End: 2022-11-18
Payer: MEDICARE

## 2022-11-21 ENCOUNTER — HOSPITAL ENCOUNTER (OUTPATIENT)
Dept: PREADMISSION TESTING | Facility: OTHER | Age: 72
Discharge: HOME | End: 2022-11-21
Attending: ORTHOPAEDIC SURGERY
Payer: MEDICARE

## 2022-11-21 ENCOUNTER — OFFICE VISIT (OUTPATIENT)
Dept: DIABETES | Facility: CLINIC | Age: 72
End: 2022-11-21
Payer: MEDICARE

## 2022-11-21 ENCOUNTER — ANESTHESIA EVENT (OUTPATIENT)
Dept: SURGERY | Facility: OTHER | Age: 72
End: 2022-11-21
Payer: MEDICARE

## 2022-11-21 ENCOUNTER — TELEPHONE (OUTPATIENT)
Dept: DIABETES | Facility: CLINIC | Age: 72
End: 2022-11-21

## 2022-11-21 VITALS
OXYGEN SATURATION: 96 % | SYSTOLIC BLOOD PRESSURE: 118 MMHG | HEART RATE: 80 BPM | DIASTOLIC BLOOD PRESSURE: 70 MMHG | WEIGHT: 176.81 LBS | BODY MASS INDEX: 34.71 KG/M2 | HEIGHT: 60 IN

## 2022-11-21 VITALS
BODY MASS INDEX: 33.77 KG/M2 | SYSTOLIC BLOOD PRESSURE: 115 MMHG | TEMPERATURE: 98 F | DIASTOLIC BLOOD PRESSURE: 55 MMHG | OXYGEN SATURATION: 96 % | HEIGHT: 60 IN | HEART RATE: 81 BPM | WEIGHT: 172 LBS | RESPIRATION RATE: 18 BRPM

## 2022-11-21 DIAGNOSIS — E66.09 CLASS 1 OBESITY DUE TO EXCESS CALORIES WITH SERIOUS COMORBIDITY AND BODY MASS INDEX (BMI) OF 34.0 TO 34.9 IN ADULT: ICD-10-CM

## 2022-11-21 DIAGNOSIS — Z71.9 HEALTH EDUCATION/COUNSELING: ICD-10-CM

## 2022-11-21 DIAGNOSIS — E11.65 TYPE 2 DIABETES MELLITUS WITH HYPERGLYCEMIA, WITH LONG-TERM CURRENT USE OF INSULIN: Primary | ICD-10-CM

## 2022-11-21 DIAGNOSIS — Z79.4 TYPE 2 DIABETES MELLITUS WITH HYPERGLYCEMIA, WITH LONG-TERM CURRENT USE OF INSULIN: Primary | ICD-10-CM

## 2022-11-21 DIAGNOSIS — Z01.818 PREOP TESTING: Primary | ICD-10-CM

## 2022-11-21 DIAGNOSIS — E78.5 DYSLIPIDEMIA, GOAL LDL BELOW 100: ICD-10-CM

## 2022-11-21 DIAGNOSIS — I10 PRIMARY HYPERTENSION: ICD-10-CM

## 2022-11-21 LAB
ABO + RH BLD: NORMAL
BILIRUB UR QL STRIP: NEGATIVE
BLD GP AB SCN CELLS X3 SERPL QL: NORMAL
CLARITY UR: CLEAR
COLOR UR: YELLOW
GLUCOSE UR QL STRIP: NEGATIVE
HGB UR QL STRIP: ABNORMAL
KETONES UR QL STRIP: NEGATIVE
LEUKOCYTE ESTERASE UR QL STRIP: NEGATIVE
MICROSCOPIC COMMENT: ABNORMAL
NITRITE UR QL STRIP: NEGATIVE
PH UR STRIP: 6 [PH] (ref 5–8)
PROT UR QL STRIP: NEGATIVE
RBC #/AREA URNS HPF: 10 /HPF (ref 0–4)
SP GR UR STRIP: >=1.03 (ref 1–1.03)
URN SPEC COLLECT METH UR: ABNORMAL
UROBILINOGEN UR STRIP-ACNC: NEGATIVE EU/DL

## 2022-11-21 PROCEDURE — 99215 OFFICE O/P EST HI 40 MIN: CPT | Mod: PBBFAC,PN | Performed by: NURSE PRACTITIONER

## 2022-11-21 PROCEDURE — 36415 COLL VENOUS BLD VENIPUNCTURE: CPT | Performed by: ORTHOPAEDIC SURGERY

## 2022-11-21 PROCEDURE — 99999 PR PBB SHADOW E&M-EST. PATIENT-LVL V: ICD-10-PCS | Mod: PBBFAC,,, | Performed by: NURSE PRACTITIONER

## 2022-11-21 PROCEDURE — 99214 OFFICE O/P EST MOD 30 MIN: CPT | Mod: S$PBB,,, | Performed by: NURSE PRACTITIONER

## 2022-11-21 PROCEDURE — 99999 PR PBB SHADOW E&M-EST. PATIENT-LVL V: CPT | Mod: PBBFAC,,, | Performed by: NURSE PRACTITIONER

## 2022-11-21 PROCEDURE — 81000 URINALYSIS NONAUTO W/SCOPE: CPT | Performed by: ORTHOPAEDIC SURGERY

## 2022-11-21 PROCEDURE — 99214 PR OFFICE/OUTPT VISIT, EST, LEVL IV, 30-39 MIN: ICD-10-PCS | Mod: S$PBB,,, | Performed by: NURSE PRACTITIONER

## 2022-11-21 PROCEDURE — 86850 RBC ANTIBODY SCREEN: CPT | Performed by: ORTHOPAEDIC SURGERY

## 2022-11-21 RX ORDER — ACETAMINOPHEN 500 MG
1000 TABLET ORAL
Status: CANCELLED | OUTPATIENT
Start: 2022-11-21 | End: 2022-11-21

## 2022-11-21 RX ORDER — METFORMIN HYDROCHLORIDE 500 MG/1
500 TABLET, EXTENDED RELEASE ORAL 2 TIMES DAILY WITH MEALS
Qty: 180 TABLET | Refills: 2 | Status: SHIPPED | OUTPATIENT
Start: 2022-11-21 | End: 2023-09-22 | Stop reason: SDUPTHER

## 2022-11-21 RX ORDER — SUB-Q INSULIN DEVICE, 40 UNIT
EACH MISCELLANEOUS
Qty: 30 EACH | Refills: 6 | Status: SHIPPED | OUTPATIENT
Start: 2022-11-21 | End: 2023-01-31 | Stop reason: SDUPTHER

## 2022-11-21 RX ORDER — INSULIN LISPRO 100 [IU]/ML
INJECTION, SOLUTION INTRAVENOUS; SUBCUTANEOUS
Qty: 30 ML | Refills: 6 | Status: SHIPPED | OUTPATIENT
Start: 2022-11-21 | End: 2023-05-04

## 2022-11-21 RX ORDER — SEMAGLUTIDE 1.34 MG/ML
1 INJECTION, SOLUTION SUBCUTANEOUS
Qty: 1 PEN | Refills: 6 | Status: SHIPPED | OUTPATIENT
Start: 2022-11-21 | End: 2023-01-03 | Stop reason: SDUPTHER

## 2022-11-21 RX ORDER — SODIUM CHLORIDE, SODIUM LACTATE, POTASSIUM CHLORIDE, CALCIUM CHLORIDE 600; 310; 30; 20 MG/100ML; MG/100ML; MG/100ML; MG/100ML
INJECTION, SOLUTION INTRAVENOUS CONTINUOUS
Status: CANCELLED | OUTPATIENT
Start: 2022-11-21

## 2022-11-21 RX ORDER — PREGABALIN 75 MG/1
75 CAPSULE ORAL ONCE
Status: CANCELLED | OUTPATIENT
Start: 2022-11-21 | End: 2022-11-21

## 2022-11-21 NOTE — ASSESSMENT & PLAN NOTE
A1c is reasonable  A1c has improved with Ozempic   Dexcom is helping with self awareness        -- Medication Changes:   Continue Metformin  mg 2 times per day     Continue VGo20 with Humalog   Cut back on the clicks ---   2 clicks with breakfast , 3 clicks with lunch, 2 clicks with dinner    Increase Ozempic to 1 mg weekly on Thursdays         -- Reviewed goals of therapy are to get the best control we can without hypoglycemia.  -- Reviewed patient's current insulin regimen. Clarified proper insulin dose and timing in relation to meals, etc. Insulin injection sites and proper rotation instructed.    -- Advised frequent self blood glucose monitoring.  Patient encouraged to document glucose results and bring them to every clinic visit. Continue to use Dexcom  -- Hypoglycemia precautions discussed. Instructed on precautions before driving.    -- Call for Bg repeatedly < 70 or > 180.   -- Close adherence to lifestyle changes recommended.   -- Periodic follow ups for eye evaluations, foot care and dental care suggested.        Patient has diabetes mellitus and manages diabetes with intensive insulin regimen and uses prandial and basal insulin daily-- Via VGO   Patient requires a therapeutic CGM and is willing to use therapeutic CGM for the necessary frequent adjustments of insulin therapy.  I have completed an in-person visit during the previous 6 months and will continue to have in-person visits every 6 months to assess adherence to their CGM regimen and diabetes treatment plan.  Due to COVID pandemic and need for remote monitoring this tool is medically necessary

## 2022-11-21 NOTE — ANESTHESIA PREPROCEDURE EVALUATION
11/21/2022  Skip Curran is a 72 y.o., female.      Pre-op Assessment    I have reviewed the Patient Summary Reports.     I have reviewed the Nursing Notes. I have reviewed the NPO Status.   I have reviewed the Medications.     Review of Systems  Anesthesia Hx:  No problems with previous Anesthesia  Denies Family Hx of Anesthesia complications.  Personal Hx of Anesthesia complications Slow To Awaken/Delayed Emergence   Social:  Non-Smoker, No Alcohol Use    Hematology/Oncology:  Hematology Normal   Oncology Normal     Cardiovascular:   Hypertension hyperlipidemia 11/2022 - nml stress test with nml EF   Pulmonary:   COPD Asthma mild Lung Nodule  Breathing at baseline -- no issues   Renal/:  Renal/ Normal     Hepatic/GI:   GERD, well controlled    Musculoskeletal:   Arthritis   Spine Disorders:    Endocrine:   Diabetes insulin pump  Thyroid Nodule   Psych:   depression          Physical Exam  General: Well nourished and Cooperative    Airway:  Mallampati: II   Mouth Opening: Normal  TM Distance: Normal  Neck ROM: Normal ROM    Dental:  Intact        Anesthesia Plan  Type of Anesthesia, risks & benefits discussed:    Anesthesia Type: Spinal  Intra-op Monitoring Plan: Standard ASA Monitors  Post Op Pain Control Plan: multimodal analgesia  Induction:  IV  Airway Plan: Video  Informed Consent: Informed consent signed with the Patient and all parties understand the risks and agree with anesthesia plan.  All questions answered.   ASA Score: 3  Day of Surgery Review of History & Physical: H&P Update referred to the surgeon/provider.  Anesthesia Plan Notes: Labs 11/2022 reviewed and ok    Had stress test 11/22 -- normal with normal EF    Will see her Endocrinologist today to provide mgmt recs for her insulin pump and transmitter (bovie pad left side of body)    Ready For Surgery From Anesthesia Perspective.      .

## 2022-11-21 NOTE — ASSESSMENT & PLAN NOTE
Body mass index is 34.53 kg/m².  Increases insulin resistance.   Discussed DM diet and exercise.

## 2022-11-21 NOTE — DISCHARGE INSTRUCTIONS
Information to Prepare you for your Surgery    PRE-ADMIT TESTING -  [phone]    [address] [facility]          Your surgery has been scheduled at [facility]. We are pleased to have the opportunity to serve you. For Further Information please call [phone].    On the day of surgery please report to the Information Desk on the 1st floor.    CONTACT YOUR PHYSICIAN'S OFFICE THE DAY PRIOR TO YOUR SURGERY TO OBTAIN YOUR ARRIVAL TIME.     The evening before surgery do not eat anything after 9 p.m. ( this includes hard candy, chewing gum and mints).  You may only have WATER  from 9 p.m. until you leave your home.   DO NOT DRINK ANY LIQUIDS ON THE WAY TO THE HOSPITAL.      If you are a diabetic-drink only water prior to surgery.    Current Visitor policy(12/27/2021) - Patients may have 2 visitors pre and post procedure. Only 2 visitors will be allowed in the Surgical building with the patient.     SPECIAL MEDICATION INSTRUCTIONS: TAKE medications checked off by the Anesthesiologist on your Medication List.    Angiogram Patients: Take medications as instructed by your physician, including aspirin.     Surgery Patients:    If you take ASPIRIN - Your PHYSICIAN/SURGEON will need to inform you IF/OR when you need to stop taking aspirin prior to your surgery.     The week prior to surgery do not ot take any medications containing IBUPROFEN or NSAIDS ( Advil, Motrin, Goodys, BC, Aleve, Naproxen etc) If you are not sure if you should take a medicine please call your surgeon's office.  Ok to take Tylenol    Do Not Wear any make-up (especially eye make-up) to surgery. Please remove any false eyelashes or eyelash extensions. If you arrive the day of surgery with makeup/eyelashes on you will be required to remove prior to surgery. (There is a risk of corneal abrasions if eye makeup/eyelash extensions are not removed)      Leave all valuables at home.   Do Not wear any  jewelry or watches, including any metal in body piercings. Jewelry must be removed prior to coming to the hospital.  There is a possibility that rings that are unable to be removed may be cut off if they are on the surgical extremity.    Please remove all hair extensions, wigs, clips and any other metal accessories/ ornaments from your hair.  These items may pose a flammable/fire risk in Surgery and must be removed.    Do not shave your surgical area at least 5 days prior to your surgery. The surgical prep will be performed at the hospital according to Infection Control regulations.    Contact Lens must be removed before surgery. Either do not wear the contact lens or bring a case and solution for storage.  Please bring a container for eyeglasses or dentures as required.  Bring any paperwork your physician has provided, such as consent forms,  history and physicals, doctor's orders, etc.   Bring comfortable clothes that are loose fitting to wear upon discharge. Take into consideration the type of surgery being performed.  Maintain your diet as advised per your physician the day prior to surgery.      Adequate rest the night before surgery is advised.   Park in the Parking lot behind the hospital or in the [employer] [facility] across the street from the parking lot. Parking is complimentary.  If you will be discharged the same day as your procedure, please arrange for a responsible adult to drive you home or to accompany you if traveling by taxi.   YOU WILL NOT BE PERMITTED TO DRIVE OR TO LEAVE THE HOSPITAL ALONE AFTER SURGERY.   If you are being discharged the same day, it is strongly recommended that you arrange for someone to remain with you for the first 24 hrs following your surgery.    The Surgeon will speak to your family/visitor after your surgery regarding the outcome of your surgery and post op care.  The Surgeon may speak to you after your surgery, but there is a possibility you may not remember the  details.  Please check with your family members regarding the conversation with the Surgeon.    We strongly recommend whoever is bringing you home be present for discharge instructions.  This will ensure a thorough understanding for your post op home care.    ALL CHILDREN MUST ALWAYS BE ACCOMPANIED BY AN ADULT.    Visitors-Refer to current Visitor policy handouts.    Thank you for your cooperation.  The Staff of [facility].            Bathing Instructions with Hibiclens    Shower the evening before and morning of your procedure with Chlorhexidine (Hibiclens)  do not use Chlorhexidine on your face or genitals. Do not get in your eyes.  Wash your face with water and your regular face wash/soap  Use your regular shampoo  Apply Chlorhexidine (Hibiclens) directly on your skin or on a wet washcloth and wash gently. When showering: Move away from the shower stream when applying Chlorhexidine (Hibiclens) to avoid rinsing off too soon.  Rinse thoroughly with warm water  Do not dilute Chlorhexidine (Hibiclens)   Dry off as usual, do not use any deodorant, powder, body lotions, perfume, after shave or cologne.

## 2022-11-21 NOTE — PATIENT INSTRUCTIONS
Metformin  mg 2 times per day     VGo20 with Humalog   Cut back on the clicks ---   2 clicks with breakfast , 3 clicks with lunch, 2 clicks with dinner    Increase Ozempic to 1 mg weekly on Thursdays

## 2022-11-21 NOTE — PROGRESS NOTES
CC:   Chief Complaint   Patient presents with    Diabetes Mellitus       HPI: Skip Curran is a 72 y.o. female presents for a follow up visit today for the management of T2DM.     She was diagnosed with Type 2 diabetes around 4837-1681 on routine lab work. She was initially started on Metformin and then later started insulin therapy around 2010.     Family hx of diabetes: father, and maternal grandmother   Hospitalized for diabetes: denies     No personal or personal of pancreatic cancer or pancreatitis.   + sister with thyroid cancer - unknown the kind of thyroid cancer       Our last visit was in March 2022  At that visit we continued her metformin, continued her Ozempic, continued her V Go 20  We continued her Dexcom personal CGM  Her recent A1c is still well controlled at 6.8%  She is wearing her Dexcom however we are unable to download recent data.  Data is reviewed from 2021  Will see if diabetes education is able to get the data.  She is wearing her Dexcom today in her blood sugars in the 140s  She does have a total knee replacement surgery coming up            DIABETES COMPLICATIONS: none      Diabetes Management Status    ASA:  No-- allergy to ASA- Hives     Statin: Taking- Crestor 20 mg nightly   ACE/ARB: Taking- Lisinopril 10 mg daily     Screening or Prevention Patient's value Goal Complete/Controlled?   HgA1C Testing and Control   Lab Results   Component Value Date    HGBA1C 6.8 (H) 11/03/2022      Annually/Less than 8% Yes   Lipid profile : 11/03/2022 Annually Yes   LDL control Lab Results   Component Value Date    LDLCALC 60.2 (L) 11/03/2022    Annually/Less than 100 mg/dl  Yes   Nephropathy screening Lab Results   Component Value Date    LABMICR 13.0 05/27/2021     Lab Results   Component Value Date    PROTEINUA Negative 11/21/2022    Annually No   Blood pressure BP Readings from Last 1 Encounters:   11/21/22 118/70    Less than 140/90 Yes   Dilated retinal exam : [facility]2021[facility] number [phone] Annually No   Foot exam   : 03/16/2022 Annually No       CURRENT A1C:    Hemoglobin A1C   Date Value Ref Range Status   11/03/2022 6.8 (H) 4.0 - 5.6 % Final     Comment:     ADA Screening Guidelines:  5.7-6.4%  Consistent with prediabetes  >or=6.5%  Consistent with diabetes    High levels of fetal hemoglobin interfere with the HbA1C  assay. Heterozygous hemoglobin variants (HbS, HgC, etc)do  not significantly interfere with this assay.   However, presence of multiple variants may affect accuracy.     08/08/2022 6.7 (H) 4.0 - 5.6 % Final     Comment:     ADA Screening Guidelines:  5.7-6.4%  Consistent with prediabetes  >or=6.5%  Consistent with diabetes    High levels of fetal hemoglobin interfere with the HbA1C  assay. Heterozygous hemoglobin variants (HbS, HgC, etc)do  not significantly interfere with this assay.   However, presence of multiple variants may affect accuracy.     05/26/2022 6.7 (H) 4.0 - 5.6 % Final     Comment:     ADA Screening Guidelines:  5.7-6.4%  Consistent with prediabetes  >or=6.5%  Consistent with diabetes    High levels of fetal hemoglobin interfere with the HbA1C  assay. Heterozygous hemoglobin variants (HbS, HgC, etc)do  not significantly interfere with this assay.   However, presence of multiple variants may affect accuracy.         GOAL A1C: 6.5%-7% without hypoglycemia     DM MEDICATIONS USED IN THE PAST:  Metformin   Lantus, Janumet  Novolog 70/30  VGo  Dexcom   Metformin XR  Novolog   Humalog       CURRENT DIABETES MEDICATIONS: Metformin  mg BID and  VGo20 with Humalog 3 clicks with breakfast , 4 clicks with lunch, 3 clicks with dinner  Giving clicks before meals- sometimes after meals   Ozempic 0.5 mg weekly on Thursdays   Insulin:  VGo  Missed doses: denies   Wearing VGO to abdomen.   Changing VGo every 24 hours.  No skin irritation     VGo from [employer].       BLOOD GLUCOSE MONITORING:    Sensor type: Dexcom G6   Average BG readin%   16% high   0% very high   <1% low   <1% very low   Time in range: 83%  Site change:   q10 days    2 weeks prior -- BG was 156   Estimated A1c was 7%   Om target range 78%     Data is from October - 2021     Sensor was downloaded in clinic today and reviewed with patient.   Please see attached document for download.     Dexcom supplies from [employer]         HYPOGLYCEMIA: <1% low and <1very low   Glucagon kit: denTutamee - lives alone   Medic alert bracelet: denies   Drinks OJ       MEALS: eating 3 meals per day   BF: toast or bagel and coffee- with sweet and low and SF creamer   Lunch: sister in law cooks every day   Dinner: home cooked as well.   Snack: occ on peanut, occ candy   Drinks: water or diet drinks        CURRENT EXERCISE:  No    Review of Systems  Review of Systems   Constitutional:  Negative for appetite change and fatigue.   HENT:  Negative for trouble swallowing.    Eyes:  Negative for visual disturbance.   Respiratory:  Negative for shortness of breath.    Cardiovascular:  Negative for chest pain.   Gastrointestinal:  Negative for nausea.   Endocrine: Negative for polydipsia, polyphagia and polyuria.   Genitourinary:         No Nocturia    Musculoskeletal:  Positive for arthralgias, back pain and joint swelling.        Right knee swelling    Skin:  Negative for wound.   Neurological:  Negative for numbness.     Physical Exam   Physical Exam  Vitals and nursing note reviewed.   Constitutional:       Appearance: She is well-developed. She is obese.   HENT:      Head: Normocephalic and atraumatic.      Right Ear: External ear normal.      Left Ear: External ear normal.      Nose: Nose normal.   Neck:      Thyroid: No thyromegaly.      Trachea: No tracheal deviation.   Cardiovascular:      Rate and Rhythm: Normal rate and regular rhythm.      Heart sounds: No murmur heard.  Pulmonary:      Effort: Pulmonary effort is normal. No respiratory distress.       Breath sounds: Normal breath sounds.   Abdominal:      Palpations: Abdomen is soft.      Tenderness: There is no abdominal tenderness.      Hernia: No hernia is present.   Musculoskeletal:      Cervical back: Normal range of motion and neck supple.   Skin:     General: Skin is warm and dry.      Capillary Refill: Capillary refill takes less than 2 seconds.      Findings: No rash.      Comments: VGo and Dexcom sites are normal appearing. No lipo hypertropthy or atrophy     Neurological:      Mental Status: She is alert and oriented to person, place, and time.      Cranial Nerves: No cranial nerve deficit.   Psychiatric:         Behavior: Behavior normal.         Judgment: Judgment normal.       FOOT EXAMINATION: Appropriate footwear         Lab Results   Component Value Date    TSH 1.349 11/03/2022         Type 2 diabetes mellitus with hyperglycemia, with long-term current use of insulin  A1c is reasonable  A1c has improved with Ozempic   Dexcom is helping with self awareness        -- Medication Changes:   Continue Metformin  mg 2 times per day     Continue VGo20 with Humalog   Cut back on the clicks ---   2 clicks with breakfast , 3 clicks with lunch, 2 clicks with dinner    Increase Ozempic to 1 mg weekly on Thursdays         -- Reviewed goals of therapy are to get the best control we can without hypoglycemia.  -- Reviewed patient's current insulin regimen. Clarified proper insulin dose and timing in relation to meals, etc. Insulin injection sites and proper rotation instructed.    -- Advised frequent self blood glucose monitoring.  Patient encouraged to document glucose results and bring them to every clinic visit. Continue to use Dexcom  -- Hypoglycemia precautions discussed. Instructed on precautions before driving.    -- Call for Bg repeatedly < 70 or > 180.   -- Close adherence to lifestyle changes recommended.   -- Periodic follow ups for eye evaluations, foot care and dental care  suggested.        Patient has diabetes mellitus and manages diabetes with intensive insulin regimen and uses prandial and basal insulin daily-- Via VGO   Patient requires a therapeutic CGM and is willing to use therapeutic CGM for the necessary frequent adjustments of insulin therapy.  I have completed an in-person visit during the previous 6 months and will continue to have in-person visits every 6 months to assess adherence to their CGM regimen and diabetes treatment plan.  Due to COVID pandemic and need for remote monitoring this tool is medically necessary      HTN (hypertension)  BP goal is < 140/90.   Tolerating ACEi  Controlled   Blood pressure goals discussed with patient      Dyslipidemia, goal LDL below 100  On statin per ADA recommendations  LDL goal < 100. LDL at goal. LFTs WNL. Continue statin.         Class 1 obesity due to excess calories with serious comorbidity and body mass index (BMI) of 34.0 to 34.9 in adult  Body mass index is 34.53 kg/m².  Increases insulin resistance.   Discussed DM diet and exercise.       Follow up in about 6 months (around 5/21/2023).-   Labs prior to follow-up      Orders Placed This Encounter   Procedures    Hemoglobin A1C     Standing Status:   Future     Standing Expiration Date:   5/21/2024    Basic Metabolic Panel     Standing Status:   Future     Standing Expiration Date:   5/21/2024    Microalbumin/Creatinine Ratio, Urine     Standing Status:   Future     Standing Expiration Date:   5/21/2024     Order Specific Question:   Specimen Source     Answer:   Urine         Recommendations were discussed with the patient in detail  The patient verbalized understanding and agrees with the plan outlined as above.     This note was partly generated with [employer] voice recognition software. I apologize for any possible typographical errors.

## 2022-11-28 ENCOUNTER — HOSPITAL ENCOUNTER (OUTPATIENT)
Facility: OTHER | Age: 72
Discharge: HOME HEALTH | End: 2022-11-28
Attending: ORTHOPAEDIC SURGERY | Admitting: ORTHOPAEDIC SURGERY
Payer: MEDICARE

## 2022-11-28 ENCOUNTER — ANESTHESIA (OUTPATIENT)
Dept: SURGERY | Facility: OTHER | Age: 72
End: 2022-11-28
Payer: MEDICARE

## 2022-11-28 VITALS
SYSTOLIC BLOOD PRESSURE: 115 MMHG | HEART RATE: 67 BPM | OXYGEN SATURATION: 95 % | HEIGHT: 60 IN | WEIGHT: 172 LBS | DIASTOLIC BLOOD PRESSURE: 57 MMHG | RESPIRATION RATE: 16 BRPM | TEMPERATURE: 98 F | BODY MASS INDEX: 33.77 KG/M2

## 2022-11-28 DIAGNOSIS — M17.12 OSTEOARTHRITIS OF LEFT KNEE: ICD-10-CM

## 2022-11-28 DIAGNOSIS — M17.12 PRIMARY OSTEOARTHRITIS OF LEFT KNEE: Primary | ICD-10-CM

## 2022-11-28 LAB
FINAL PATHOLOGIC DIAGNOSIS: NORMAL
GROSS: NORMAL
Lab: NORMAL
MICROSCOPIC EXAM: NORMAL
POCT GLUCOSE: 120 MG/DL (ref 70–110)

## 2022-11-28 PROCEDURE — 63600175 PHARM REV CODE 636 W HCPCS

## 2022-11-28 PROCEDURE — 25000003 PHARM REV CODE 250: Performed by: ANESTHESIOLOGY

## 2022-11-28 PROCEDURE — 25000003 PHARM REV CODE 250: Performed by: STUDENT IN AN ORGANIZED HEALTH CARE EDUCATION/TRAINING PROGRAM

## 2022-11-28 PROCEDURE — 36000710: Performed by: ORTHOPAEDIC SURGERY

## 2022-11-28 PROCEDURE — 63600175 PHARM REV CODE 636 W HCPCS: Performed by: ORTHOPAEDIC SURGERY

## 2022-11-28 PROCEDURE — 97110 THERAPEUTIC EXERCISES: CPT

## 2022-11-28 PROCEDURE — 71000015 HC POSTOP RECOV 1ST HR: Performed by: ORTHOPAEDIC SURGERY

## 2022-11-28 PROCEDURE — 97162 PT EVAL MOD COMPLEX 30 MIN: CPT

## 2022-11-28 PROCEDURE — C9290 INJ, BUPIVACAINE LIPOSOME: HCPCS | Performed by: ORTHOPAEDIC SURGERY

## 2022-11-28 PROCEDURE — 37000008 HC ANESTHESIA 1ST 15 MINUTES: Performed by: ORTHOPAEDIC SURGERY

## 2022-11-28 PROCEDURE — 71000016 HC POSTOP RECOV ADDL HR: Performed by: ORTHOPAEDIC SURGERY

## 2022-11-28 PROCEDURE — 63600175 PHARM REV CODE 636 W HCPCS: Performed by: ANESTHESIOLOGY

## 2022-11-28 PROCEDURE — A4216 STERILE WATER/SALINE, 10 ML: HCPCS | Performed by: ORTHOPAEDIC SURGERY

## 2022-11-28 PROCEDURE — 71000033 HC RECOVERY, INTIAL HOUR: Performed by: ORTHOPAEDIC SURGERY

## 2022-11-28 PROCEDURE — C1713 ANCHOR/SCREW BN/BN,TIS/BN: HCPCS | Performed by: ORTHOPAEDIC SURGERY

## 2022-11-28 PROCEDURE — 63600175 PHARM REV CODE 636 W HCPCS: Performed by: STUDENT IN AN ORGANIZED HEALTH CARE EDUCATION/TRAINING PROGRAM

## 2022-11-28 PROCEDURE — C1781 MESH (IMPLANTABLE): HCPCS | Performed by: ORTHOPAEDIC SURGERY

## 2022-11-28 PROCEDURE — 25000003 PHARM REV CODE 250: Performed by: ORTHOPAEDIC SURGERY

## 2022-11-28 PROCEDURE — 97116 GAIT TRAINING THERAPY: CPT

## 2022-11-28 PROCEDURE — 36000711: Performed by: ORTHOPAEDIC SURGERY

## 2022-11-28 PROCEDURE — 37000009 HC ANESTHESIA EA ADD 15 MINS: Performed by: ORTHOPAEDIC SURGERY

## 2022-11-28 PROCEDURE — C1776 JOINT DEVICE (IMPLANTABLE): HCPCS | Performed by: ORTHOPAEDIC SURGERY

## 2022-11-28 PROCEDURE — 27201423 OPTIME MED/SURG SUP & DEVICES STERILE SUPPLY: Performed by: ORTHOPAEDIC SURGERY

## 2022-11-28 PROCEDURE — 25000003 PHARM REV CODE 250

## 2022-11-28 PROCEDURE — 71000039 HC RECOVERY, EACH ADD'L HOUR: Performed by: ORTHOPAEDIC SURGERY

## 2022-11-28 DEVICE — PSN TIB STM 5 DEG SZ D L: Type: IMPLANTABLE DEVICE | Site: KNEE | Status: FUNCTIONAL

## 2022-11-28 DEVICE — PATELLA NEXGEN ALL-POLY 35MM D: Type: IMPLANTABLE DEVICE | Site: KNEE | Status: FUNCTIONAL

## 2022-11-28 DEVICE — IMPLANTABLE DEVICE: Type: IMPLANTABLE DEVICE | Site: KNEE | Status: FUNCTIONAL

## 2022-11-28 DEVICE — CEMENT REFOBACIN BCR 1X40: Type: IMPLANTABLE DEVICE | Site: KNEE | Status: FUNCTIONAL

## 2022-11-28 RX ORDER — LIDOCAINE HYDROCHLORIDE 20 MG/ML
INJECTION INTRAVENOUS
Status: DISCONTINUED | OUTPATIENT
Start: 2022-11-28 | End: 2022-11-28

## 2022-11-28 RX ORDER — KETOROLAC TROMETHAMINE 30 MG/ML
INJECTION, SOLUTION INTRAMUSCULAR; INTRAVENOUS
Status: DISCONTINUED | OUTPATIENT
Start: 2022-11-28 | End: 2022-11-28 | Stop reason: HOSPADM

## 2022-11-28 RX ORDER — HYDROMORPHONE HYDROCHLORIDE 2 MG/ML
0.4 INJECTION, SOLUTION INTRAMUSCULAR; INTRAVENOUS; SUBCUTANEOUS EVERY 5 MIN PRN
Status: DISCONTINUED | OUTPATIENT
Start: 2022-11-28 | End: 2022-11-28

## 2022-11-28 RX ORDER — SODIUM CHLORIDE 0.9 % (FLUSH) 0.9 %
3 SYRINGE (ML) INJECTION
Status: DISCONTINUED | OUTPATIENT
Start: 2022-11-28 | End: 2022-11-28

## 2022-11-28 RX ORDER — SODIUM CHLORIDE, SODIUM LACTATE, POTASSIUM CHLORIDE, CALCIUM CHLORIDE 600; 310; 30; 20 MG/100ML; MG/100ML; MG/100ML; MG/100ML
INJECTION, SOLUTION INTRAVENOUS CONTINUOUS
Status: DISCONTINUED | OUTPATIENT
Start: 2022-11-28 | End: 2022-11-28 | Stop reason: HOSPADM

## 2022-11-28 RX ORDER — ACETAMINOPHEN 325 MG/1
650 TABLET ORAL EVERY 4 HOURS PRN
Status: DISCONTINUED | OUTPATIENT
Start: 2022-11-28 | End: 2022-11-28 | Stop reason: HOSPADM

## 2022-11-28 RX ORDER — CEFAZOLIN SODIUM 2 G/50ML
2 SOLUTION INTRAVENOUS
Status: COMPLETED | OUTPATIENT
Start: 2022-11-28 | End: 2022-11-28

## 2022-11-28 RX ORDER — INSULIN ASPART 100 [IU]/ML
3 INJECTION, SOLUTION INTRAVENOUS; SUBCUTANEOUS
Status: CANCELLED | OUTPATIENT
Start: 2022-11-28

## 2022-11-28 RX ORDER — BUPIVACAINE HCL/EPINEPHRINE 0.25-.0005
VIAL (ML) INJECTION
Status: DISCONTINUED | OUTPATIENT
Start: 2022-11-28 | End: 2022-11-28 | Stop reason: HOSPADM

## 2022-11-28 RX ORDER — ACETAMINOPHEN 325 MG/1
975 TABLET ORAL
Status: DISCONTINUED | OUTPATIENT
Start: 2022-11-28 | End: 2022-11-28

## 2022-11-28 RX ORDER — IBUPROFEN 200 MG
16 TABLET ORAL
Status: CANCELLED | OUTPATIENT
Start: 2022-11-28

## 2022-11-28 RX ORDER — GABAPENTIN 300 MG/1
300 CAPSULE ORAL 2 TIMES DAILY
Status: CANCELLED | OUTPATIENT
Start: 2022-11-28

## 2022-11-28 RX ORDER — OXYCODONE HYDROCHLORIDE 5 MG/1
5 TABLET ORAL
Status: DISCONTINUED | OUTPATIENT
Start: 2022-11-28 | End: 2022-11-28

## 2022-11-28 RX ORDER — ONDANSETRON 8 MG/1
8 TABLET, ORALLY DISINTEGRATING ORAL EVERY 8 HOURS PRN
Status: DISCONTINUED | OUTPATIENT
Start: 2022-11-28 | End: 2022-11-28 | Stop reason: HOSPADM

## 2022-11-28 RX ORDER — PROPOFOL 10 MG/ML
VIAL (ML) INTRAVENOUS
Status: DISCONTINUED | OUTPATIENT
Start: 2022-11-28 | End: 2022-11-28

## 2022-11-28 RX ORDER — PREGABALIN 75 MG/1
75 CAPSULE ORAL ONCE
Status: COMPLETED | OUTPATIENT
Start: 2022-11-28 | End: 2022-11-28

## 2022-11-28 RX ORDER — METOCLOPRAMIDE HYDROCHLORIDE 5 MG/ML
5 INJECTION INTRAMUSCULAR; INTRAVENOUS EVERY 6 HOURS PRN
Status: DISCONTINUED | OUTPATIENT
Start: 2022-11-28 | End: 2022-11-28 | Stop reason: HOSPADM

## 2022-11-28 RX ORDER — NEOMYCIN SULFATE, POLYMYXIN B SULFATE, AND DEXAMETHASONE 3.5; 10000; 1 MG/G; [USP'U]/G; MG/G
OINTMENT OPHTHALMIC EVERY 8 HOURS
Status: CANCELLED | OUTPATIENT
Start: 2022-11-28

## 2022-11-28 RX ORDER — OXYCODONE HYDROCHLORIDE 5 MG/1
10 TABLET ORAL EVERY 4 HOURS PRN
Status: DISCONTINUED | OUTPATIENT
Start: 2022-11-28 | End: 2022-11-28 | Stop reason: HOSPADM

## 2022-11-28 RX ORDER — FENTANYL CITRATE 50 UG/ML
INJECTION, SOLUTION INTRAMUSCULAR; INTRAVENOUS
Status: DISCONTINUED | OUTPATIENT
Start: 2022-11-28 | End: 2022-11-28

## 2022-11-28 RX ORDER — AMLODIPINE BESYLATE 5 MG/1
10 TABLET ORAL DAILY
Status: CANCELLED | OUTPATIENT
Start: 2022-11-29

## 2022-11-28 RX ORDER — OXYBUTYNIN CHLORIDE 5 MG/1
5 TABLET, EXTENDED RELEASE ORAL DAILY
Status: CANCELLED | OUTPATIENT
Start: 2022-11-28

## 2022-11-28 RX ORDER — METFORMIN HYDROCHLORIDE 500 MG/1
500 TABLET, EXTENDED RELEASE ORAL 2 TIMES DAILY WITH MEALS
Status: CANCELLED | OUTPATIENT
Start: 2022-11-28

## 2022-11-28 RX ORDER — CEFAZOLIN SODIUM 1 G/3ML
2 INJECTION, POWDER, FOR SOLUTION INTRAMUSCULAR; INTRAVENOUS
Status: COMPLETED | OUTPATIENT
Start: 2022-11-28 | End: 2022-11-28

## 2022-11-28 RX ORDER — FENOFIBRATE 145 MG/1
145 TABLET, FILM COATED ORAL NIGHTLY
Status: CANCELLED | OUTPATIENT
Start: 2022-11-28

## 2022-11-28 RX ORDER — INSULIN ASPART 100 [IU]/ML
0-5 INJECTION, SOLUTION INTRAVENOUS; SUBCUTANEOUS
Status: CANCELLED | OUTPATIENT
Start: 2022-11-28

## 2022-11-28 RX ORDER — OXYCODONE HYDROCHLORIDE 5 MG/1
5 TABLET ORAL EVERY 4 HOURS PRN
Status: DISCONTINUED | OUTPATIENT
Start: 2022-11-28 | End: 2022-11-28 | Stop reason: HOSPADM

## 2022-11-28 RX ORDER — IBUPROFEN 200 MG
24 TABLET ORAL
Status: CANCELLED | OUTPATIENT
Start: 2022-11-28

## 2022-11-28 RX ORDER — TRAZODONE HYDROCHLORIDE 50 MG/1
100 TABLET ORAL NIGHTLY
Status: CANCELLED | OUTPATIENT
Start: 2022-11-28

## 2022-11-28 RX ORDER — TRANEXAMIC ACID 100 MG/ML
INJECTION, SOLUTION INTRAVENOUS
Status: DISCONTINUED | OUTPATIENT
Start: 2022-11-28 | End: 2022-11-28

## 2022-11-28 RX ORDER — ONDANSETRON 2 MG/ML
INJECTION INTRAMUSCULAR; INTRAVENOUS
Status: DISCONTINUED | OUTPATIENT
Start: 2022-11-28 | End: 2022-11-28

## 2022-11-28 RX ORDER — SODIUM CHLORIDE 0.9 % (FLUSH) 0.9 %
3 SYRINGE (ML) INJECTION EVERY 6 HOURS PRN
Status: DISCONTINUED | OUTPATIENT
Start: 2022-11-28 | End: 2022-11-28 | Stop reason: HOSPADM

## 2022-11-28 RX ORDER — PROPOFOL 10 MG/ML
VIAL (ML) INTRAVENOUS CONTINUOUS PRN
Status: DISCONTINUED | OUTPATIENT
Start: 2022-11-28 | End: 2022-11-28

## 2022-11-28 RX ORDER — LORAZEPAM 0.5 MG/1
0.5 TABLET ORAL EVERY 12 HOURS PRN
Status: CANCELLED | OUTPATIENT
Start: 2022-11-28

## 2022-11-28 RX ORDER — MEPERIDINE HYDROCHLORIDE 25 MG/ML
12.5 INJECTION INTRAMUSCULAR; INTRAVENOUS; SUBCUTANEOUS ONCE AS NEEDED
Status: DISCONTINUED | OUTPATIENT
Start: 2022-11-28 | End: 2022-11-28

## 2022-11-28 RX ORDER — OXYCODONE AND ACETAMINOPHEN 5; 325 MG/1; MG/1
TABLET ORAL
Qty: 60 TABLET | Refills: 0 | Status: SHIPPED | OUTPATIENT
Start: 2022-11-28 | End: 2023-11-15 | Stop reason: DRUGHIGH

## 2022-11-28 RX ORDER — GLUCAGON 1 MG
1 KIT INJECTION
Status: CANCELLED | OUTPATIENT
Start: 2022-11-28

## 2022-11-28 RX ORDER — PROCHLORPERAZINE EDISYLATE 5 MG/ML
5 INJECTION INTRAMUSCULAR; INTRAVENOUS EVERY 30 MIN PRN
Status: DISCONTINUED | OUTPATIENT
Start: 2022-11-28 | End: 2022-11-28

## 2022-11-28 RX ORDER — ACETAMINOPHEN 500 MG
1000 TABLET ORAL
Status: COMPLETED | OUTPATIENT
Start: 2022-11-28 | End: 2022-11-28

## 2022-11-28 RX ORDER — ONDANSETRON 8 MG/1
8 TABLET, ORALLY DISINTEGRATING ORAL EVERY 8 HOURS PRN
Qty: 20 TABLET | Refills: 1 | Status: SHIPPED | OUTPATIENT
Start: 2022-11-28 | End: 2023-12-11

## 2022-11-28 RX ORDER — ROPIVACAINE HYDROCHLORIDE 5 MG/ML
INJECTION, SOLUTION EPIDURAL; INFILTRATION; PERINEURAL
Status: COMPLETED | OUTPATIENT
Start: 2022-11-28 | End: 2022-11-28

## 2022-11-28 RX ORDER — LISINOPRIL 5 MG/1
10 TABLET ORAL DAILY
Status: CANCELLED | OUTPATIENT
Start: 2022-11-29

## 2022-11-28 RX ORDER — PHENYLEPHRINE HYDROCHLORIDE 10 MG/ML
INJECTION INTRAVENOUS
Status: DISCONTINUED | OUTPATIENT
Start: 2022-11-28 | End: 2022-11-28

## 2022-11-28 RX ORDER — SODIUM CHLORIDE 9 MG/ML
INJECTION, SOLUTION INTRAMUSCULAR; INTRAVENOUS; SUBCUTANEOUS
Status: DISCONTINUED | OUTPATIENT
Start: 2022-11-28 | End: 2022-11-28 | Stop reason: HOSPADM

## 2022-11-28 RX ORDER — ATORVASTATIN CALCIUM 20 MG/1
40 TABLET, FILM COATED ORAL NIGHTLY
Status: CANCELLED | OUTPATIENT
Start: 2022-11-28

## 2022-11-28 RX ADMIN — ACETAMINOPHEN 1000 MG: 500 TABLET ORAL at 11:11

## 2022-11-28 RX ADMIN — OXYCODONE 5 MG: 5 TABLET ORAL at 02:11

## 2022-11-28 RX ADMIN — TRANEXAMIC ACID 2000 MG: 100 INJECTION, SOLUTION INTRAVENOUS at 12:11

## 2022-11-28 RX ADMIN — PREGABALIN 75 MG: 75 CAPSULE ORAL at 11:11

## 2022-11-28 RX ADMIN — PHENYLEPHRINE HYDROCHLORIDE 100 MCG: 10 INJECTION INTRAVENOUS at 01:11

## 2022-11-28 RX ADMIN — ONDANSETRON HYDROCHLORIDE 4 MG: 2 INJECTION INTRAMUSCULAR; INTRAVENOUS at 12:11

## 2022-11-28 RX ADMIN — PHENYLEPHRINE HYDROCHLORIDE 200 MCG: 10 INJECTION INTRAVENOUS at 02:11

## 2022-11-28 RX ADMIN — FENTANYL CITRATE 50 MCG: 50 INJECTION, SOLUTION INTRAMUSCULAR; INTRAVENOUS at 12:11

## 2022-11-28 RX ADMIN — SODIUM CHLORIDE, SODIUM LACTATE, POTASSIUM CHLORIDE, AND CALCIUM CHLORIDE: 600; 310; 30; 20 INJECTION, SOLUTION INTRAVENOUS at 12:11

## 2022-11-28 RX ADMIN — PROPOFOL 60 MG: 10 INJECTION, EMULSION INTRAVENOUS at 01:11

## 2022-11-28 RX ADMIN — ROPIVACAINE HYDROCHLORIDE 3 ML: 5 INJECTION, SOLUTION EPIDURAL; INFILTRATION; PERINEURAL at 12:11

## 2022-11-28 RX ADMIN — LIDOCAINE HYDROCHLORIDE 60 MG: 20 INJECTION, SOLUTION INTRAVENOUS at 01:11

## 2022-11-28 RX ADMIN — VANCOMYCIN HYDROCHLORIDE 1250 MG: 1.25 INJECTION, POWDER, LYOPHILIZED, FOR SOLUTION INTRAVENOUS at 11:11

## 2022-11-28 RX ADMIN — GLYCOPYRROLATE 0.2 MG: 0.2 INJECTION, SOLUTION INTRAMUSCULAR; INTRAVITREAL at 01:11

## 2022-11-28 RX ADMIN — PROPOFOL 150 MCG/KG/MIN: 10 INJECTION, EMULSION INTRAVENOUS at 01:11

## 2022-11-28 RX ADMIN — SODIUM CHLORIDE, SODIUM LACTATE, POTASSIUM CHLORIDE, AND CALCIUM CHLORIDE: 600; 310; 30; 20 INJECTION, SOLUTION INTRAVENOUS at 01:11

## 2022-11-28 RX ADMIN — CEFAZOLIN SODIUM 2 G: 2 SOLUTION INTRAVENOUS at 06:11

## 2022-11-28 RX ADMIN — PHENYLEPHRINE HYDROCHLORIDE 200 MCG: 10 INJECTION INTRAVENOUS at 01:11

## 2022-11-28 RX ADMIN — CEFAZOLIN 2 G: 330 INJECTION, POWDER, FOR SOLUTION INTRAMUSCULAR; INTRAVENOUS at 01:11

## 2022-11-28 NOTE — PLAN OF CARE
Problem: Physical Therapy  Goal: Physical Therapy Goal  Description: Goals to be met by: 22    Patient will increase functional independence with mobility by performin. Sit<>stand with supervision with RW.  2. Gait x 150 feet with supervision with RW.  3. Ascend/descend threshold step(s) with least restrictive assistive device and supervision.   Outcome: Ongoing, Progressing     Anticipated d/c to home this PM, POC established to ensure continuity of care in case of change of d/c plans.    Patient evaluated by PT and goals established. Patient with moderate pain throughout session and limited ROM (knee flexion 5>45 deg). Gait, bed mobility, transfer, and stair training performed with pt requiring CGA progressing to SBA. Patient appropriate for dc to home with HH at this time. Please see progress note for detailed plan of care and recommendations.

## 2022-11-28 NOTE — CONSULTS
Consult Note  MED    Consult Requested By: Jules Carolina MD  Reason for Consult: DM/HTN/Lipids/Depression    SUBJECTIVE:     History of Present Illness:  Patient is a 72 y.o. female presents with scheduled left knee repair.  Seen pre-op in holding but maybe going home due to bed shortage pending post op therapies.  Pre-op/Epic reviewed.  Discussed with team.  No CP/SOB/N/V/D/F/C.      Past Medical History:   Diagnosis Date    Allergy     Arthritis     Asthma     Back injuries     two broken bones that healed on own    Cataract     Depression     Diabetes mellitus     Diabetes mellitus type I     GERD (gastroesophageal reflux disease)     Hypercholesteremia     Hypertension     Insomnia     Slow to wake up after anesthesia 2002    Orthoscope of knee     Past Surgical History:   Procedure Laterality Date    anal fissure surgery      BELPHAROPTOSIS REPAIR Bilateral 11/11/2022    Procedure: REPAIR, BLEPHAROPTOSIS;  Surgeon: Francheska Arechiga MD;  Location: [address];  Service: Ophthalmology;  Laterality: Bilateral;    BREAST BIOPSY Right     benign    BRONCHOSCOPY N/A 10/22/2019    Procedure: bronch ambero , noon;  Surgeon: Pepe Andersen MD;  Location: [facility];  Service: Endoscopy;  Laterality: N/A;    COLONOSCOPY  09/18/2020    COLONOSCOPY N/A 9/18/2020    Procedure: COLONOSCOPY;  Surgeon: Magno Gant MD;  Location: [facility];  Service: Colon and Rectal;  Laterality: N/A;    HYSTERECTOMY      KNEE ARTHROSCOPY W/ DEBRIDEMENT Left     x3    LIPOMA RESECTION      fatty tumor between breast    SHOULDER SURGERY Left 2/11/2016    Dr Burris     TONSILLECTOMY      TOTAL KNEE ARTHROPLASTY Right 3/18/2021    Procedure: ARTHROPLASTY, KNEE, TOTAL;  Surgeon: Rashi De La O MD;  Location: [facility];  Service: Orthopedics;  Laterality: Right;  ADDUCTOR BLOCK    WRIST RECONSTRUCTION Right     tendon     Family History   Problem Relation Age of Onset    Arthritis Mother     Hypertension Mother     Arthritis Father      Diabetes Father     Hypertension Father     Breast cancer Sister     Cancer Sister         breast cancer     Breast cancer Paternal Aunt      Social History     Tobacco Use    Smoking status: Never    Smokeless tobacco: Never   Substance Use Topics    Alcohol use: Yes     Alcohol/week: 1.0 standard drink     Types: 1 Shots of liquor per week     Comment: socially    Drug use: No       Review of patient's allergies indicates:   Allergen Reactions    Aspirin Hives    Aleve [naproxen sodium] Hives    Iodinated contrast media     Iodine Other (See Comments)     WHEN INJECTED- pt  states she coded    Pcn [penicillins] Rash        Review of Systems:  Constitutional: No fever or chills  Respiratory: No cough or shortness of breath  Cardiovascular: No chest pain or palpitations  Gastrointestinal: No nausea or vomiting  Neurological: No confusion or weakness    OBJECTIVE:     Vital Signs (Most Recent)  Temp: 98.5 °F (36.9 °C) (11/28/22 1202)  Pulse: 67 (11/28/22 1202)  Resp: 16 (11/28/22 1202)  BP: 130/65 (11/28/22 1202)  SpO2: 97 % (11/28/22 1202)    Vital Signs Range (Last 24H):  Temp:  [98.5 °F (36.9 °C)]   Pulse:  [67]   Resp:  [16]   BP: (130)/(65)   SpO2:  [97 %]     No intake or output data in the 24 hours ending 11/28/22 1258    Physical Exam:  General appearance: Well developed, well nourished  Eyes:  Conjunctivae/corneas clear. PERRL.  Lungs: Normal respiratory effort,   clear to auscultation bilaterally   Heart: Regular rate and rhythm, S1, S2 normal, no murmur, rub or lisy.  Abdomen: Soft, non-tender non-distended; bowel sounds normal; no masses,  no organomegaly  Extremities: No cyanosis or clubbing. No edema.    Skin: Skin color, texture, turgor normal. No rashes or lesions  Neurologic: Normal strength and tone. No focal numbness or weakness       Laboratory:  No results for input(s): WBC, RBC, HGB, HCT, PLT, MCV, MCH, MCHC in the last 24 hours.  BMP: No results for input(s): GLU, NA, K, CL, CO2, BUN,  CREATININE, CALCIUM, MG in the last 168 hours.    Invalid input(s):  PHOS  Lab Results   Component Value Date    CALCIUM 10.1 11/03/2022    PHOS 3.1 08/10/2009     BNP  No results for input(s): BNP, BNPTRIAGEBLO in the last 168 hours.  Lab Results   Component Value Date    URICACID 3.1 09/29/2017   No results found for: IRON, TIBC, FERRITIN, SATURATEDIRO  Lab Results   Component Value Date    PTH 32 03/07/2017    CALCIUM 10.1 11/03/2022    PHOS 3.1 08/10/2009       Diagnostic Results:  No orders to display       ASSESSMENT/PLAN:     Left Knee:  per ortho/therapy teams.    DM:  followed by Endo.  On V-go/dexcom.  Change post op IVF's to saline.  Basal and prandial insulins ordered if stays inpt.  Accu check ac/hs.  A1C:  6.8    HTN:  meds with hold parameters.     Lipids:  statin/tricor.    Depression:  home meds resumed.     DVT:  ASA BID.      Thanks for consult  See above  Will follow along post op if stays overnight.

## 2022-11-28 NOTE — PLAN OF CARE
[facility]  [address]  [place] [address]  [phone]               [facility] Discharge Orders    Home [place]         Expected Discharge Date: 11/28/22    Diagnoses:  Post-op  left knee(s) replacement    Patient is homebound due to:   Pt requires home health services due to taxing effort to leave the home as a result of immobility from Post-op left knee(s) replacement    Weight Bearing Status:   full weight bearing: FWB unless otherwise indicated.  Progress to cane as able.  Set up for outpatient PT as soon as able after staple removal once patient is MOD 1 with cane.    Physical Therapy:   3 times a week for 2 weeks (Call for further orders beyond 2 weeks)  - Ambulate with a rolling walker  - Progress to cane  - Instruct on ROM and strengthening of knee    Aide to provide assistance with personal care and  ADLs  2 times a week for 2 weeks    Wound Care:   Surgical dressing change:Starting on  post op day 2 then, 3 times per week and prn saturation.Change dressing while knee in flexed position utilizing island dressing or apply gauze and cover with tegaderm.  Teach patient to change daily if draining.  Pt may shower if surgical dressing is waterproof. Protect surgical dressing from getting wet by using press and seal saranwrap or garbage bag. Removal and replacement of dressing after shower only needed if incision is suspected  to have gotten wet during shower.  Otherwise change as previously described depending on dressing/drainage. No soaking in the tub or hot tub use for 6 weeks.    PT/SN to remove staples 14 days Post-op and apply skin prep and steri-strips.    Cold therapy/Ice: Encouraged at least 20 minutes 2-3 times daily or more if desired.  Incision must be kept waterproof while icing.  Wear TEDS Bilateral Thigh High Stockings for 3 weeks. Maninder hose x 3 weeks. Ok to remove maninder hose 1-2 hours/day max if desired.   If CPM ordered Utilize 2 hours in morning and 2  hours in evening. , Start at -5 degrees extension and 50 degree flexion. Increase flexion 10 degrees daily. Low post op mobility.       Contact:  Please contact Dr Jules Carolina [phone] with concerns.        BLOOD THINNER:    If sent home on Xarelto         -14 days post-op for TKR       -30 days post-op for THR     If sent home home on ASA    81mg   BID x 4 weeks     Once finished with prescribed blood thinner, patients can return to pre-surgical ASA dosage if they took ASA before surgery.       Outpatient Therapy: [facility]    [address] [address]   or  [address] [place] [address]    Call [phone] to schedule appointment  Fax [phone]    If need orders: Call Lesli at Ext [phone]    DME:  - rolling Walker  - 3 in 1 commode  - tub bench / shower chair  - Per PT/OT recommendation          Jules Carolina

## 2022-11-28 NOTE — OP NOTE
[facility]  Operative Report    SUMMARY     Surgery Date: 11/28/2022     Surgeon(s) and Role:     * Jules Carolina MD - Primary    Assistant: SOFIA Forman    Pre-op Diagnosis:  Unilateral primary osteoarthritis, left knee [M17.12]    Post-op Diagnosis:  Post-Op Diagnosis Codes:     * Unilateral primary osteoarthritis, left knee [M17.12]    Procedure(s) (LRB):  ARTHROPLASTY, KNEE TOTAL (Left) ([facility])    Anesthesia: Spinal    Description of Procedure: The appropriate consent was signed. The patient understood and except all risks and complications. The patient was brought to the Operating Room after undergoing spinal anesthetic. Tourniquet was applied to the proximal operative leg. The lower extremity was then prepped and draped in a sterile manner. After exsanguination of Esmarch bandage, tourniquet was inflated to 300 mmHg. An anterior incision with a medial parapatellar arthrotomy was performed. The menisci, anterior cruciate ligament and patellar fat pad were excised. The patella was resurfaced and sized to a 35 mm patella.   Three holes were then drilled for the lugs and this was trialed. A hole was then  drilled in the distal femur, john was placed down the femoral canal and the   distal femur cut in 6 degrees of valgus. The tibia was then cut  with the extramedullary device   in 0 degree varus valgus with 5 degrees in posterior   slope. Extension block was placed and full extension was noted. The femur was   then sized to a #5 and #5 cutting block was placed and the anterior and   posterior cuts as well as chamfer cuts were performed. The tibia was sized to a  size D and a trial was performed.Trials were performed and a 12 mm UC spacer was felt to be appropriate.The tibia was then prepared with the drill and the punch, and trials were   removed and the knee washed out. Aquamantys was used to paint the posterior   capsule and corners. Palacos cement with gentamicin was then mixed and    pressurized sequentially in tibia, femur and patella. Components were impacted   and excess cement was removed. A trial 12 mm UC spacer was placed and knee   brought out to full extension. Once the cement was allowed to harden, the 12 mm UC  spacer was felt to be appropriate and this was locked into the tibial   baseplate. Tourniquet was deflated and hemostasis was obtained. Good patellar   tracking was noted. Exparel cocktail was injected into the fascia and   subcutaneous tissue. The fascial closure was obtained with a running #2 Quill suture. Subcutaneous closure was obtained with #1 and 2-0 Vicryl. Skin was then closed with the staples and a sterile compressive dressing was applied. The patient was then brought to the Recovery Room in a good condition.        Estimated Blood Loss: 100cc         Specimens:   Specimen (24h ago, onward)      None

## 2022-11-28 NOTE — ANESTHESIA PROCEDURE NOTES
Spinal    Diagnosis: DJD knee  Patient location during procedure: holding area  Timeout: 11/28/2022 12:45 PM    Staffing  Authorizing Provider: Shawn Nair MD  Performing Provider: Shawn Nair MD    Preanesthetic Checklist  Completed: patient identified, IV checked, site marked, risks and benefits discussed, surgical consent, monitors and equipment checked, pre-op evaluation and timeout performed  Spinal Block  Patient position: sitting  Prep: ChloraPrep  Patient monitoring: heart rate, cardiac monitor, continuous pulse ox and frequent blood pressure checks  Approach: midline  Location: L3-4  Injection technique: single shot  CSF Fluid: clear free-flowing CSF  Needle  Needle gauge: 25 G  Needle length: 3.5 in  Additional Documentation: incremental injection, negative aspiration for heme and no paresthesia on injection  Needle localization: anatomical landmarks  Assessment  Ease of block: easy  Patient's tolerance of the procedure: comfortable throughout block and no complaints  Medications:    Medications: ropivacaine (NAROPIN) injection 0.5% - Intraspinal   3 mL - 11/28/2022 12:49:00 PM

## 2022-11-28 NOTE — PLAN OF CARE
YOHANA contacted the patient's daughters, Damaris Funes and Frances Curran, by phone.    SW also spoke with the patient in her room.    The patient is independent at home. She lives in a small apt. Attached to her dtr's home.    Patient has all needed DME. She had [facility] prior and has decided to stay with this    Agency. Patient's dtr signed the Choice Form.    Patient's daughters will transport her home and assist her as needed.     11/28/22 6310   Discharge Assessment   Assessment Type Discharge Planning Assessment   Confirmed/corrected address, phone number and insurance Yes   Source of Information patient;family   Communicated LEELEE with patient/caregiver Yes   Reason For Admission L-TKA   Lives With alone   Do you expect to return to your current living situation? Yes   Do you have help at home or someone to help you manage your care at home? Yes   Who are your caregiver(s) and their phone number(s)? Damaris Funes - [phone]   Prior to hospitilization cognitive status: Alert/Oriented   Current cognitive status: Alert/Oriented   Walking or Climbing Stairs Difficulty none   Dressing/Bathing Difficulty none   Equipment Currently Used at Home none   Readmission within 30 days? No   Patient currently being followed by outpatient case management? No   Do you currently have service(s) that help you manage your care at home? No   Do you take prescription medications? Yes   Do you have prescription coverage? Yes   Coverage Medicare A & B   Is the patient taking medications as prescribed? yes   Who is going to help you get home at discharge? Dtrs   How do you get to doctors appointments? family or friend will provide   Are you on dialysis? No   Do you take coumadin? No   Discharge Plan A Home with family;Home Health   DME Needed Upon Discharge  none   Discharge Plan discussed with: Adult children;Patient   Discharge Barriers Identified None

## 2022-11-28 NOTE — TRANSFER OF CARE
Anesthesia Transfer of Care Note    Patient: Skip Curran    Procedure(s) Performed: Procedure(s) (LRB):  ARTHROPLASTY, KNEE TOTAL (Left)    Patient location: PACU    Anesthesia Type: spinal    Transport from OR: Transported from OR on 2-3 L/min O2 by NC with adequate spontaneous ventilation    Post pain: adequate analgesia    Post assessment: no apparent anesthetic complications and tolerated procedure well    Post vital signs: stable    Level of consciousness: awake, alert and oriented    Nausea/Vomiting: no nausea/vomiting    Complications: none    Transfer of care protocol was followed      Last vitals:   Visit Vitals  /65 (BP Location: Left arm, Patient Position: Lying)   Pulse 67   Temp 36.4 °C (97.6 °F) (Temporal)   Resp 16   Ht 5' (1.524 m)   Wt 78 kg (172 lb)   SpO2 97%   Breastfeeding No   BMI 33.59 kg/m²

## 2022-11-28 NOTE — ANESTHESIA POSTPROCEDURE EVALUATION
Anesthesia Post Evaluation    Patient: Skip Curran    Procedure(s) Performed: Procedure(s) (LRB):  ARTHROPLASTY, KNEE TOTAL (Left)    Final Anesthesia Type: spinal      Patient location during evaluation: PACU  Patient participation: Yes- Able to Participate  Level of consciousness: awake and alert  Post-procedure vital signs: reviewed and stable  Pain management: adequate  Airway patency: patent    PONV status at discharge: No PONV  Anesthetic complications: no      Cardiovascular status: hemodynamically stable  Respiratory status: unassisted  Hydration status: euvolemic  Follow-up not needed.          Vitals Value Taken Time   /65 11/28/22 1613   Temp 36.2 °C (97.1 °F) 11/28/22 1618   Pulse 58 11/28/22 1620   Resp 16 11/28/22 1613   SpO2 96 % 11/28/22 1620   Vitals shown include unvalidated device data.      No case tracking events are documented in the log.      Pain/Ewa Score: Pain Rating Prior to Med Admin: 4 (11/28/2022  2:51 PM)  Pain Rating Post Med Admin: 0 (11/28/2022  3:25 PM)  Ewa Score: 10 (11/28/2022  4:10 PM)

## 2022-11-29 ENCOUNTER — TELEPHONE (OUTPATIENT)
Dept: DIABETES | Facility: CLINIC | Age: 72
End: 2022-11-29
Payer: MEDICARE

## 2022-11-29 NOTE — TELEPHONE ENCOUNTER
----- Message from Lv Wheeler sent at 11/29/2022 11:19 AM CST -----  Contact: pt  Pt requesting call back RE: she states that she only received 9 of her supplies and the last time this happen a new script is needed           Confirmed contact below:  Contact Name:Skip Curran  Phone Number: [phone]

## 2022-11-29 NOTE — PT/OT/SLP EVAL
Physical Therapy Evaluation and Treatment    Patient Name:  Skip Curran   MRN:  [id number]    Recommendations:     Discharge Recommendations:  home health PT (progressing to OP PT)   Discharge Equipment Recommendations: none   Barriers to discharge: None    Assessment:     Skip Curran is a 72 y.o. female admitted with a medical diagnosis of Unilateral primary osteoarthritis, left knee.  She presents with the following impairments/functional limitations:  weakness, impaired endurance, impaired self care skills, impaired functional mobility, gait instability, impaired balance, decreased coordination, decreased upper extremity function, decreased lower extremity function, decreased safety awareness, pain, impaired skin, orthopedic precautions.    Patient evaluated by PT and goals established. Patient with moderate pain throughout session and limited ROM (knee flexion 5>45 deg). Gait, bed mobility, transfer, and stair training performed with pt requiring CGA progressing to SBA. Patient appropriate for dc to home with HH at this time.    Rehab Prognosis: Good; patient would benefit from acute skilled PT services to address these deficits and reach maximum level of function.    Recent Surgery: Procedure(s) (LRB):  ARTHROPLASTY, KNEE TOTAL (Left) Day of Surgery    Plan:     During this hospitalization, patient to be seen daily to address the identified rehab impairments via gait training, therapeutic activities, therapeutic exercises and progress toward the following goals:    Plan of Care Expires:  11/29/22    Subjective     Chief Complaint: Pain in knee, previous TKA did not heal well with continued neuropathy along anterior knee  Patient/Family Comments/goals: Pt agreeable to DC POD0, family concerned about DC plans; Patient agreeable to evaluation.  Pain/Comfort:  Pain Rating 1:  (moderate)  Location - Side 1: Left  Location 1: knee  Pain Addressed 1: Pre-medicate for activity, Reposition, Distraction, Cessation of  "Activity, Nurse notified, Other (see comments) (ice donned)  Pain Rating Post-Intervention 1:  (icnreased with knee flexion and WB)    Patients cultural, spiritual, Druze conflicts given the current situation: no    Living Environment:  Pt lives alone in a 1st story apartment with threshold steps to enter   Upon discharge, patient will have assistance from family PRN, but not available .  Prior level of function:  Ambulation: Mod(I)  ADL's: Mod(I)  IADLs: Mod(I)  Has had a manipulation for R TKA  Falls: Denies  Equipment used at home: bedside commode, walker, rolling (from previous TKA).      Objective:     Communicated with RN and ortho navigator prior to session.  Patient found HOB elevated with peripheral IV  upon PT entry to room.    General Precautions: Standard, fall   Orthopedic Precautions:LLE weight bearing as tolerated   Braces: N/A  Respiratory Status: Room air    Patient donned non slip socks and gait belt for OOB mobility.    Exams:  Cognition:   Patient is oriented to name, , date, place, situation.  Pt follows approximately 100% of one step commands.    Mood: Pleasant and cooperative.   Musculoskeletal:  Posture: Protective guarding surgical joint  LE ROM/Strength: 5/5 bilateral hip flexion, nonsurgical knee extension, bilateral ankle dorsiflexion. AROM surgical knee difficult to accurately assess with large bulky dressing, although knee flexion grossly 5>45 degrees. Non surgical knee flexion ROM 0>90 deg. Surgical knee strength grossly 5/5 knee flexion and 3-/5 knee extension.  Neuromuscular:  Sensation: Intact to light touch bilateral LEs. Pt reports "neuropathy"  Coordination/Tone/Reflexes: No impairments identified with functional mobility. No formal testing performed.   Balance: CGA progressing to SBA for dynamic standing with bilateral UE support.   Visual-vestibular: No impairments identified with functional mobility. No formal testing performed.  Integument:  Visible skin intact " "and surgical extremity dressing clean and dry.   Cardiopulmonary:  Edema: Mild surgical extremity.    Color/temperature/pulses: Equal    Functional Mobility:  Bed Mobility:     Supine to Sit: stand by assistance  Transfers:     Sit to Stand:  stand by assistance and contact guard assistance with rolling walker  From EOB, chair, and BSC  Gait: 2x140 ft with RW and CGA progressing to SBA.   Gait deviations of decreased stride length with step to gait pattern, decreased surgical knee flexion, and decreased surgical heel strike/toe off. Increased double limb support time. With verbal cues noted improvements in step thru gait with continued decreased knee flexion during swing.  Stairs:  Pt ascended/descended 4" curb step with Rolling Walker with no handrails with Contact Guard Assistance.   Performed ascent without cueing for sequencing, requires cueing for descent sequencing      AM-PAC 6 CLICK MOBILITY  Total Score:18       Treatment & Education:  Pt performed supine therapeutic exercises including ankle pumps, quad sets, glute sets, SLR, SAQs, heel slides, abd/add x 10 reps with verbal and tactile cues.   Gait:  Cueing for step thru, RW height adjustment for appropriate UE support, and step sequencing  Verbal cueing for stair seuqnecing   PT educated patient and fmaily re:   PT plan of care/role of PT  Safety with OOB mobility  Use of RW  Positioning of LE and use of ice  Orthopedic precautions  Gait deviations  Therapeutic exercises  Discharge disposition    Pt and family verbalized understanding       Patient left up in chair with all lines intact, call button in reach, RN and OT notified, ortho navigator and family present, and white board updated .    GOALS:   Multidisciplinary Problems       Physical Therapy Goals          Problem: Physical Therapy    Goal Priority Disciplines Outcome Goal Variances Interventions   Physical Therapy Goal     PT, PT/OT Ongoing, Progressing     Description: Goals to be met by: " 22    Patient will increase functional independence with mobility by performin. Sit<>stand with supervision with RW.  2. Gait x 150 feet with supervision with RW.  3. Ascend/descend threshold step(s) with least restrictive assistive device and supervision.                        History:     Past Medical History:   Diagnosis Date    Allergy     Arthritis     Asthma     Back injuries     two broken bones that healed on own    Cataract     Depression     Diabetes mellitus     Diabetes mellitus type I     GERD (gastroesophageal reflux disease)     Hypercholesteremia     Hypertension     Insomnia     Slow to wake up after anesthesia     Orthoscope of knee       Past Surgical History:   Procedure Laterality Date    anal fissure surgery      BELPHAROPTOSIS REPAIR Bilateral 2022    Procedure: REPAIR, BLEPHAROPTOSIS;  Surgeon: Francheska Arechiga MD;  Location: [address];  Service: Ophthalmology;  Laterality: Bilateral;    BREAST BIOPSY Right     benign    BRONCHOSCOPY N/A 10/22/2019    Procedure: bronch floro , noon;  Surgeon: Pepe Andersen MD;  Location: [facility];  Service: Endoscopy;  Laterality: N/A;    COLONOSCOPY  2020    COLONOSCOPY N/A 2020    Procedure: COLONOSCOPY;  Surgeon: Magno Gant MD;  Location: [facility];  Service: Colon and Rectal;  Laterality: N/A;    HYSTERECTOMY      KNEE ARTHROSCOPY W/ DEBRIDEMENT Left     x3    LIPOMA RESECTION      fatty tumor between breast    SHOULDER SURGERY Left 2016    Dr Burris     TONSILLECTOMY      TOTAL KNEE ARTHROPLASTY Right 3/18/2021    Procedure: ARTHROPLASTY, KNEE, TOTAL;  Surgeon: Rashi De La O MD;  Location: [facility];  Service: Orthopedics;  Laterality: Right;  ADDUCTOR BLOCK    WRIST RECONSTRUCTION Right     tendon       Time Tracking:     PT Received On: 22  PT Start Time: 172     PT Stop Time: 1752  PT Total Time (min): 31 min     Billable Minutes: Evaluation 6, Gait Training 15, and Therapeutic Exercise  10      11/28/2022

## 2022-11-29 NOTE — PLAN OF CARE
11/28/22 1810   Final Note   Assessment Type Final Discharge Note   Anticipated Discharge Disposition Home-[facility]   Hospital Resources/Appts/Education Provided Provided patient/caregiver with written discharge plan information;Appointments scheduled and added to AVS;Appointments scheduled by Navigator/Coordinator   Post-Acute Status   Coverage Medicare A & B   Patient choice form signed by patient/caregiver List with quality metrics by geographic area provided   Discharge Delays None known at this time     Patient was d/c'd to home w/ her daughters.     The patient needed no DME and selected [facility] to start tomorrow.    Information sent to [facility] / SW spoke with Liza pompa.

## 2022-11-29 NOTE — PLAN OF CARE
Skpi Curran has met all discharge criteria from Phase II. Vital Signs are stable, ambulating  without difficulty. Discharge instructions given, patient verbalized understanding. Discharged from facility via wheelchair in stable condition.

## 2022-12-05 NOTE — DISCHARGE SUMMARY
[facility]  Short Stay  Discharge Summary  TOTAL KNEE REPLACEMENT    Admit Date: 11/28/2022    Discharge Date and Time: 11/28/2022  6:45 PM      Discharge Attending Physician: Jules Carolina MD    Hospital Course:  Skip Curran,is a 72 y.o. female with severe osteoarthritis L knee, unrelieved with the conservative measures. The patient was admitted on  11/28/2022 and underwent L total knee replacement with computer-assisted navigation. Postoperatively, weightbearing as tolerated with a walker and CPM machine was initiated on postop day #1. Skip Curran did quite well and was discharged on 11/28/2022  with home health physical therapy and nursing. The patient will be on Percocet for pain and aspirin 325 mg p.o. b.i.d. with meals x4 weeks.  A CPM machine will will be sent home with the patient. Postoperative follow up will be in the office in 4 weeks.       Final Diagnoses:    Principal Problem: Unilateral primary osteoarthritis, left knee   Secondary Diagnoses:   Active Hospital Problems    Diagnosis  POA    *Unilateral primary osteoarthritis, left knee [M17.12]  Yes      Resolved Hospital Problems   No resolved problems to display.       Discharged Condition: good    Disposition: Home-Health Care [facility]    Follow up/Patient Instructions:    Medications:  Reconciled Home Medications:      Medication List        START taking these medications      ondansetron 8 MG Tbdl  Commonly known as: ZOFRAN-ODT  Take 1 tablet (8 mg total) by mouth every 8 (eight) hours as needed (Nausea).     oxyCODONE-acetaminophen 5-325 mg per tablet  Commonly known as: PERCOCET  1 tablet every 4 hours PRN  or 2 tablets every 6 hours PRN     XARELTO 10 mg Tab  Generic drug: rivaroxaban  Take 1 tablet (10 mg total) by mouth daily with dinner or evening meal. Starting tomorrow night            CHANGE how you take these medications      fenofibrate 145 MG tablet  Commonly known as: TRICOR  Take 1 tablet (145 mg total) by mouth once  daily.  What changed: when to take this     MYRBETRIQ 25 mg Tb24 ER tablet  Generic drug: mirabegron  TAKE ONE TABLET BY MOUTH DAILY  What changed:   how much to take  how to take this  when to take this            CONTINUE taking these medications      albuterol 0.63 mg/3 mL Nebu  Commonly known as: ACCUNEB  Take 3 mLs (0.63 mg total) by nebulization every 6 (six) hours as needed. Rescue     amLODIPine 10 MG tablet  Commonly known as: NORVASC  TAKE ONE TABLET BY MOUTH DAILY     BAQSIMI 3 mg/actuation Spry  Generic drug: glucagon  3 mg (one actuation) into a single nostril; if no response, may repeat in 15 minutes using a new intranasal device.     blood sugar diagnostic Strp  1 strip by Misc.(Non-Drug; Combo Route) route 3 (three) times daily. Freestyle Lite strips     calcium carbonate-vitamin D3 600 mg-5 mcg (200 unit) Cap  Commonly known as: CALCIUM 600 + D(3)  Take 600 mg by mouth every evening.     calcium phosphate-melatonin 250-1.5 mg Chew  Take by mouth.     fluticasone-salmeterol 250-50 mcg/dose 250-50 mcg/dose diskus inhaler  Commonly known as: ADVAIR  INHALE 1 DOSE INTO THE LUNGS TWICE DAILY     FLUZONE HIGHDOSE QUAD 20-21  mcg/0.7 mL Syrg  Generic drug: flu vacc [id number](65yr up)-PF  PHARMACIST ADMINISTERED IMMUNIZATION ADMINISTERED AT TIME OF DISPENSING     gabapentin 300 MG capsule  Commonly known as: NEURONTIN  Take 1 capsule (300 mg total) by mouth 2 (two) times daily.     insulin lispro 100 unit/mL injection  Commonly known as: HumaLOG U-100 Insulin  To use with VGo20 . Administer  Clicks 3 times per day with food. Max TDD of 100 units     lisinopriL 10 MG tablet  Take 1 tablet (10 mg total) by mouth 2 (two) times daily.     LORazepam 0.5 MG tablet  Commonly known as: ATIVAN     metFORMIN 500 MG ER 24hr tablet  Commonly known as: GLUCOPHAGE-XR  Take 1 tablet (500 mg total) by mouth 2 (two) times daily with meals.     neomycin-polymyxin-dexamethasone 3.5 mg/g-10,000 unit/g-0.1 %  "Oint  Commonly known as: MAXITROL  Place into both eyes every 8 (eight) hours.     OZEMPIC 1 mg/dose (4 mg/3 mL)  Generic drug: semaglutide  Inject 1 mg into the skin every 7 days.     pantoprazole 40 MG tablet  Commonly known as: PROTONIX  TAKE ONE TABLET BY MOUTH DAILY FOR STOMACH REFLUX     polycarbophil 625 mg tablet  Commonly known as: FIBERCON  Take 1 tablet (625 mg total) by mouth once daily.     rosuvastatin 20 MG tablet  Commonly known as: CRESTOR  TAKE ONE TABLET BY MOUTH NIGHTLY     traZODone 100 MG tablet  Commonly known as: DESYREL  Take 1 tablet (100 mg total) by mouth every evening. For sleep     V-GO 20 Leslye  Generic drug: sub-q insulin device, 20 unit  USE AS DIRECTED ONCE A DAY            STOP taking these medications      traMADoL 50 mg tablet  Commonly known as: ULTRAM            Discharge Procedure Orders   WALKER FOR HOME USE     Order Specific Question Answer Comments   Type of Walker: Adult (5'4"-6'6")    With wheels? Yes    Height: 5' (1.524 m)    Weight: 78 kg (172 lb)    Length of need (1-99 months): 99    Please check all that apply: Patient is unable to safely ambulate without equipment.      3 IN 1 COMMODE FOR HOME USE     Order Specific Question Answer Comments   Type: Standard    Height: 5' (1.524 m)    Weight: 78 kg (172 lb)    Length of need (1-99 months): 99      BATH/SHOWER CHAIR FOR HOME USE     Order Specific Question Answer Comments   Height: 5' (1.524 m)    Weight: 78 kg (172 lb)    Length of need (1-99 months): 99    Type: With back      TRANSFER TUB BENCH FOR HOME USE     Order Specific Question Answer Comments   Type of Transfer Tub Bench: Unpadded    Height: 5' (1.524 m)    Weight: 78 kg (172 lb)    Length of need (1-99 months): 99    Patient notified - Not covered by insurance considered a convenience item Yes    Discussed financial responsibility with responsible party Yes      Diet general     Call MD for:  temperature >100.4     Call MD for:  persistent nausea and " vomiting     Call MD for:  severe uncontrolled pain     Call MD for:  difficulty breathing, headache or visual disturbances     Call MD for:  redness, tenderness, or signs of infection (pain, swelling, redness, odor or green/yellow discharge around incision site)     Call MD for:  hives     Call MD for:  persistent dizziness or light-headedness     Call MD for:  extreme fatigue      Follow-up Information       Jules Carolina MD Follow up in 4 week(s).    Specialty: Orthopedic Surgery  Contact information:  [address]  [facility]  [facility] [id number] [phone]               [facility] Follow up in 1 day(s).    Specialties: Home Health Services, Home Therapy Services, Home Living Aide Services  Why: Home Health will contact you tomorrow morning to arrange a time for your first visit.  Contact information:  [address]  [facility] [address]  [facility] [id number] [phone]

## 2022-12-13 ENCOUNTER — EXTERNAL HOME HEALTH (OUTPATIENT)
Dept: HOME HEALTH SERVICES | Facility: HOSPITAL | Age: 72
End: 2022-12-13
Payer: MEDICARE

## 2022-12-28 ENCOUNTER — TELEPHONE (OUTPATIENT)
Dept: OPTOMETRY | Facility: CLINIC | Age: 72
End: 2022-12-28
Payer: MEDICARE

## 2022-12-28 NOTE — TELEPHONE ENCOUNTER
----- Message from Anusha Tate sent at 12/27/2022  9:07 AM CST -----  Contact: [phone]  Pt called requesting a sooner appt then her original appt. She stated that she is out of contacts. Please give pt a call to further assist.

## 2023-01-16 DIAGNOSIS — Z79.4 TYPE 2 DIABETES MELLITUS WITH HYPERGLYCEMIA, WITH LONG-TERM CURRENT USE OF INSULIN: ICD-10-CM

## 2023-01-16 DIAGNOSIS — E11.65 TYPE 2 DIABETES MELLITUS WITH HYPERGLYCEMIA, WITH LONG-TERM CURRENT USE OF INSULIN: ICD-10-CM

## 2023-01-17 RX ORDER — INSULIN LISPRO 100 [IU]/ML
INJECTION, SOLUTION INTRAVENOUS; SUBCUTANEOUS
Qty: 30 ML | Refills: 6 | OUTPATIENT
Start: 2023-01-17

## 2023-01-25 ENCOUNTER — TELEPHONE (OUTPATIENT)
Dept: DIABETES | Facility: CLINIC | Age: 73
End: 2023-01-25
Payer: MEDICARE

## 2023-01-31 ENCOUNTER — TELEPHONE (OUTPATIENT)
Dept: DIABETES | Facility: CLINIC | Age: 73
End: 2023-01-31
Payer: MEDICARE

## 2023-01-31 DIAGNOSIS — Z79.4 TYPE 2 DIABETES MELLITUS WITH HYPERGLYCEMIA, WITH LONG-TERM CURRENT USE OF INSULIN: ICD-10-CM

## 2023-01-31 DIAGNOSIS — E11.65 TYPE 2 DIABETES MELLITUS WITH HYPERGLYCEMIA, WITH LONG-TERM CURRENT USE OF INSULIN: ICD-10-CM

## 2023-01-31 RX ORDER — SUB-Q INSULIN DEVICE, 40 UNIT
EACH MISCELLANEOUS
Qty: 90 EACH | Refills: 2 | Status: SHIPPED | OUTPATIENT
Start: 2023-01-31 | End: 2023-02-09 | Stop reason: SDUPTHER

## 2023-02-09 ENCOUNTER — TELEPHONE (OUTPATIENT)
Dept: DIABETES | Facility: CLINIC | Age: 73
End: 2023-02-09
Payer: MEDICARE

## 2023-02-09 DIAGNOSIS — E11.65 TYPE 2 DIABETES MELLITUS WITH HYPERGLYCEMIA, WITH LONG-TERM CURRENT USE OF INSULIN: ICD-10-CM

## 2023-02-09 DIAGNOSIS — Z79.4 TYPE 2 DIABETES MELLITUS WITH HYPERGLYCEMIA, WITH LONG-TERM CURRENT USE OF INSULIN: ICD-10-CM

## 2023-02-09 RX ORDER — SUB-Q INSULIN DEVICE, 40 UNIT
EACH MISCELLANEOUS
Qty: 30 EACH | Refills: 6 | Status: SHIPPED | OUTPATIENT
Start: 2023-02-09 | End: 2023-02-09 | Stop reason: SDUPTHER

## 2023-02-09 RX ORDER — SUB-Q INSULIN DEVICE, 40 UNIT
EACH MISCELLANEOUS
Qty: 30 EACH | Refills: 6 | Status: SHIPPED | OUTPATIENT
Start: 2023-02-09 | End: 2023-09-22 | Stop reason: SDUPTHER

## 2023-02-09 NOTE — TELEPHONE ENCOUNTER
Can we call the pharmacy to see what is going on??  I thought that insulin should cost patients more than 35 dollars a month??  Let me know what they say  Or maybe we need to change her to NovoLog??

## 2023-02-09 NOTE — TELEPHONE ENCOUNTER
We can try to send the Vgo prescriptions to a different pharmacy   [facility] as their own pharmacy -- [facility]--- she may be able to get it cheaper through them   Many patients have had success and I have heard that they have a cash pay program for $75   I can send it there and we can see.   If not, we may need to go back to MDI- multiple daily injection -- with long acting insulin and rapid acting insulin.   She can let us know if it is still expensive   The number to the pharmacy is [phone]  Let me know what she says   Also her current prescription was for a 90 day supplies so that could be why it was so expensive  I can send it over for a month supply at a time

## 2023-02-09 NOTE — TELEPHONE ENCOUNTER
----- Message from Tanya Jha LPN sent at 2/9/2023 11:28 AM CST -----  Pt stated she can't afford humalog, too expensive asking for an alternative

## 2023-02-09 NOTE — TELEPHONE ENCOUNTER
----- Message from Tanya Jha LPN sent at 2/9/2023  1:42 PM CST -----  I t wasn't the humalog, but I did confirm with pharmacy that humalog is only $35 but she is stating that te v-go was over $200 dollars which she can't afford, asking for alternative

## 2023-02-14 ENCOUNTER — OFFICE VISIT (OUTPATIENT)
Dept: OPHTHALMOLOGY | Facility: CLINIC | Age: 73
End: 2023-02-14
Payer: MEDICARE

## 2023-02-14 ENCOUNTER — TELEPHONE (OUTPATIENT)
Dept: DIABETES | Facility: CLINIC | Age: 73
End: 2023-02-14
Payer: MEDICARE

## 2023-02-14 DIAGNOSIS — H25.13 NUCLEAR SCLEROSIS, BILATERAL: ICD-10-CM

## 2023-02-14 DIAGNOSIS — H02.423 ACQUIRED MYOGENIC PTOSIS OF EYELID, BILATERAL: ICD-10-CM

## 2023-02-14 DIAGNOSIS — Z98.890 POST-OPERATIVE STATE: Primary | ICD-10-CM

## 2023-02-14 DIAGNOSIS — H02.413 MECHANICAL PTOSIS, ACQUIRED, BILATERAL: ICD-10-CM

## 2023-02-14 PROCEDURE — 99999 PR PBB SHADOW E&M-EST. PATIENT-LVL II: CPT | Mod: PBBFAC,,, | Performed by: OPHTHALMOLOGY

## 2023-02-14 PROCEDURE — 92285 EXTERNAL OCULAR PHOTOGRAPHY: CPT | Mod: PBBFAC | Performed by: OPHTHALMOLOGY

## 2023-02-14 PROCEDURE — 92285 EXTERNAL PHOTOGRAPHY - OU - BOTH EYES: ICD-10-PCS | Mod: 26,S$PBB,, | Performed by: OPHTHALMOLOGY

## 2023-02-14 PROCEDURE — 99212 OFFICE O/P EST SF 10 MIN: CPT | Mod: PBBFAC | Performed by: OPHTHALMOLOGY

## 2023-02-14 PROCEDURE — 99999 PR PBB SHADOW E&M-EST. PATIENT-LVL II: ICD-10-PCS | Mod: PBBFAC,,, | Performed by: OPHTHALMOLOGY

## 2023-02-14 PROCEDURE — 99024 PR POST-OP FOLLOW-UP VISIT: ICD-10-PCS | Mod: POP,,, | Performed by: OPHTHALMOLOGY

## 2023-02-14 PROCEDURE — 99024 POSTOP FOLLOW-UP VISIT: CPT | Mod: POP,,, | Performed by: OPHTHALMOLOGY

## 2023-02-14 NOTE — TELEPHONE ENCOUNTER
[facility] script stated that next fill date is 2/14, 80 dollar quote for price of v-go devices, v-go coverage  PA required for v-go

## 2023-02-14 NOTE — PROGRESS NOTES
Assessment /Plan     For exam results, see Encounter Report.    Post-operative state  -     External Photography - OU - Both Eyes    Mechanical ptosis, acquired, bilateral    Acquired myogenic ptosis of eyelid, bilateral    Nuclear sclerosis, bilateral      Patient doing well! Post-operative instructions reviewed. All questions answered. Return prn.

## 2023-02-27 ENCOUNTER — TELEPHONE (OUTPATIENT)
Dept: DIABETES | Facility: CLINIC | Age: 73
End: 2023-02-27
Payer: MEDICARE

## 2023-05-03 ENCOUNTER — TELEPHONE (OUTPATIENT)
Dept: DIABETES | Facility: CLINIC | Age: 73
End: 2023-05-03
Payer: MEDICARE

## 2023-05-03 DIAGNOSIS — E11.65 TYPE 2 DIABETES MELLITUS WITH HYPERGLYCEMIA, WITH LONG-TERM CURRENT USE OF INSULIN: Primary | ICD-10-CM

## 2023-05-03 DIAGNOSIS — Z79.4 TYPE 2 DIABETES MELLITUS WITH HYPERGLYCEMIA, WITH LONG-TERM CURRENT USE OF INSULIN: Primary | ICD-10-CM

## 2023-05-04 ENCOUNTER — TELEPHONE (OUTPATIENT)
Dept: DIABETES | Facility: CLINIC | Age: 73
End: 2023-05-04
Payer: MEDICARE

## 2023-05-04 RX ORDER — INSULIN ASPART INJECTION 100 [IU]/ML
INJECTION, SOLUTION SUBCUTANEOUS
Qty: 30 ML | Refills: 6 | Status: SHIPPED | OUTPATIENT
Start: 2023-05-04 | End: 2023-09-22 | Stop reason: SDUPTHER

## 2023-05-04 NOTE — TELEPHONE ENCOUNTER
----- Message from Tanya Jha LPN sent at 5/3/2023  3:46 PM CDT -----  Pt lispro was denied , alternative is novolin R

## 2023-05-04 NOTE — TELEPHONE ENCOUNTER
I had received another message to change it to Fiasp   So I changed it to Fiasp this AM   Please let her know

## 2023-05-25 ENCOUNTER — PATIENT MESSAGE (OUTPATIENT)
Dept: DIABETES | Facility: CLINIC | Age: 73
End: 2023-05-25
Payer: MEDICARE

## 2023-05-25 ENCOUNTER — TELEPHONE (OUTPATIENT)
Dept: DIABETES | Facility: CLINIC | Age: 73
End: 2023-05-25
Payer: MEDICARE

## 2023-05-29 ENCOUNTER — TELEPHONE (OUTPATIENT)
Dept: DIABETES | Facility: CLINIC | Age: 73
End: 2023-05-29
Payer: MEDICARE

## 2023-08-29 ENCOUNTER — TELEPHONE (OUTPATIENT)
Dept: OPTOMETRY | Facility: CLINIC | Age: 73
End: 2023-08-29
Payer: MEDICARE

## 2023-08-29 NOTE — TELEPHONE ENCOUNTER
----- Message from Deangelo Covington sent at 8/29/2023 12:09 PM CDT -----  Pt would like to be called back asa regarding questions concerning scheduling her annual eye exam and contact lens fitting.    Pt can be reached at [phone]    Thanks

## 2023-09-15 ENCOUNTER — OFFICE VISIT (OUTPATIENT)
Dept: OPTOMETRY | Facility: CLINIC | Age: 73
End: 2023-09-15
Payer: MEDICARE

## 2023-09-15 DIAGNOSIS — H25.13 NUCLEAR SCLEROSIS, BILATERAL: Primary | ICD-10-CM

## 2023-09-15 DIAGNOSIS — Z46.0 FITTING AND ADJUSTMENT OF SPECTACLES AND CONTACT LENSES: Primary | ICD-10-CM

## 2023-09-15 DIAGNOSIS — Z13.5 GLAUCOMA SCREENING: ICD-10-CM

## 2023-09-15 DIAGNOSIS — E11.9 TYPE 2 DIABETES MELLITUS WITHOUT RETINOPATHY: ICD-10-CM

## 2023-09-15 DIAGNOSIS — H52.03 HYPEROPIA WITH PRESBYOPIA OF BOTH EYES: ICD-10-CM

## 2023-09-15 DIAGNOSIS — H52.4 HYPEROPIA WITH PRESBYOPIA OF BOTH EYES: ICD-10-CM

## 2023-09-15 PROCEDURE — 92004 PR EYE EXAM, NEW PATIENT,COMPREHESV: ICD-10-PCS | Mod: S$PBB,,, | Performed by: OPTOMETRIST

## 2023-09-15 PROCEDURE — 99999 PR PBB SHADOW E&M-EST. PATIENT-LVL III: ICD-10-PCS | Mod: PBBFAC,,, | Performed by: OPTOMETRIST

## 2023-09-15 PROCEDURE — 92004 COMPRE OPH EXAM NEW PT 1/>: CPT | Mod: S$PBB,,, | Performed by: OPTOMETRIST

## 2023-09-15 PROCEDURE — 92310 CONTACT LENS FITTING OU: CPT | Mod: CSM,,, | Performed by: OPTOMETRIST

## 2023-09-15 PROCEDURE — 92071 CONTACT LENS FITTING FOR TX: CPT | Mod: PBBFAC,27,PO | Performed by: OPTOMETRIST

## 2023-09-15 PROCEDURE — 92015 DETERMINE REFRACTIVE STATE: CPT | Mod: ,,, | Performed by: OPTOMETRIST

## 2023-09-15 PROCEDURE — 92310 PR CONTACT LENS FITTING (NO CHANGE): ICD-10-PCS | Mod: CSM,,, | Performed by: OPTOMETRIST

## 2023-09-15 PROCEDURE — 99999 PR PBB SHADOW E&M-EST. PATIENT-LVL I: CPT | Mod: PBBFAC,,, | Performed by: OPTOMETRIST

## 2023-09-15 PROCEDURE — 99999 PR PBB SHADOW E&M-EST. PATIENT-LVL III: CPT | Mod: PBBFAC,,, | Performed by: OPTOMETRIST

## 2023-09-15 PROCEDURE — 99999 PR PBB SHADOW E&M-EST. PATIENT-LVL I: ICD-10-PCS | Mod: PBBFAC,,, | Performed by: OPTOMETRIST

## 2023-09-15 PROCEDURE — 92015 PR REFRACTION: ICD-10-PCS | Mod: ,,, | Performed by: OPTOMETRIST

## 2023-09-15 PROCEDURE — 99213 OFFICE O/P EST LOW 20 MIN: CPT | Mod: PBBFAC,PO | Performed by: OPTOMETRIST

## 2023-09-15 PROCEDURE — 99211 OFF/OP EST MAY X REQ PHY/QHP: CPT | Mod: PBBFAC,27,PO | Performed by: OPTOMETRIST

## 2023-09-15 NOTE — PROGRESS NOTES
HPI    74 Y/o female is here for routine eye exam with C/o pt states she having   issues with reading the computer and small print with contact lens in.  Pt denies pain and discomfort   No f/f    Eye med: OTC AT'S  QD   Last edited by Jalen Smith MA on 9/15/2023  2:01 PM.            Assessment /Plan     For exam results, see Encounter Report.    Nuclear sclerosis, bilateral    Type 2 diabetes mellitus without retinopathy    Glaucoma screening    Hyperopia with presbyopia of both eyes      Cat OU--pt wishes surgery  DM- WITHOUT RETINOPATHY.  Advised yearly DFE   Pt wears monovision one day CLs, but SLEEPS IN CLs OVERNIGHT, and throws away in the morning.  Discussed risks of EW, and advised pt to throw away Cl BEFORE bedtime    PLAN:    Wrote new CLRx to tide pt over until cat eval  Start Daily Wear schedule.  NO SLEEPING IN CONTACT LENSES. Exchange daily   Surgical consult--Dr Lezama (may be ok with monovision IOLs?)

## 2023-09-18 ENCOUNTER — OFFICE VISIT (OUTPATIENT)
Dept: OPHTHALMOLOGY | Facility: CLINIC | Age: 73
End: 2023-09-18
Payer: MEDICARE

## 2023-09-18 ENCOUNTER — TELEPHONE (OUTPATIENT)
Dept: DIABETES | Facility: CLINIC | Age: 73
End: 2023-09-18
Payer: MEDICARE

## 2023-09-18 DIAGNOSIS — H26.9 CORTICAL CATARACT OF BOTH EYES: ICD-10-CM

## 2023-09-18 DIAGNOSIS — E11.9 DM TYPE 2 WITHOUT RETINOPATHY: ICD-10-CM

## 2023-09-18 DIAGNOSIS — H25.13 NUCLEAR SCLEROSIS OF BOTH EYES: Primary | ICD-10-CM

## 2023-09-18 DIAGNOSIS — H52.7 REFRACTIVE ERROR: ICD-10-CM

## 2023-09-18 PROCEDURE — 92014 COMPRE OPH EXAM EST PT 1/>: CPT | Mod: S$PBB,,, | Performed by: OPHTHALMOLOGY

## 2023-09-18 PROCEDURE — 99214 OFFICE O/P EST MOD 30 MIN: CPT | Mod: PBBFAC,PO | Performed by: OPHTHALMOLOGY

## 2023-09-18 PROCEDURE — 99999 PR PBB SHADOW E&M-EST. PATIENT-LVL IV: ICD-10-PCS | Mod: PBBFAC,,, | Performed by: OPHTHALMOLOGY

## 2023-09-18 PROCEDURE — 92014 PR EYE EXAM, EST PATIENT,COMPREHESV: ICD-10-PCS | Mod: S$PBB,,, | Performed by: OPHTHALMOLOGY

## 2023-09-18 PROCEDURE — 99999 PR PBB SHADOW E&M-EST. PATIENT-LVL IV: CPT | Mod: PBBFAC,,, | Performed by: OPHTHALMOLOGY

## 2023-09-18 NOTE — TELEPHONE ENCOUNTER
----- Message from Soumya Triana sent at 9/18/2023  9:14 AM CDT -----  Regarding: pt advice  Contact: [phone]  TASH MILES calling regarding Refills  (message) for #her transmitter. She states the pharmacy has been reaching out for 2 weeks and haven't heard a response and she will be out on Wed. Please call    [employer] Phone: [phone]

## 2023-09-18 NOTE — PROGRESS NOTES
Subjective:       Patient ID: Skip Curran is a 73 y.o. female.    Chief Complaint: Cataract    HPI    Referred: Dr. Pérez    72 y/o female present to clinic for Cataract Evaluation. H/o of cortical   cataracts. She present to clinic for difficulty driving at night to glare   and blurry vision. She is currently wear Mono CL. Occasional dry eyes.   Denies floaters and flashes of lights. BSL was 129 this morning.     [facility]  At's OU PRN    Hemoglobin A1C       Date                     Value               Ref Range             Status                05/23/2023               6.7 (H)             4.0 - 5.6 %           Final                Last edited by Magnolia Salas on 9/18/2023  8:14 AM.             Assessment:       1. Nuclear sclerosis of both eyes    2. Cortical cataract of both eyes    3. DM type 2 without retinopathy    4. Refractive error        Plan:       Visually significant cataract OU -Pt. Wants Sx.     DM-No NPDR OU.  RE-Pt wants CE OS for reading. Pt is a SCL wearer.      Cataract Surgery Consent: Patient with a visually significant cataract with difficulties of ADLs, reading, driving, night vision, glare (any and all).  Discussed with Patient/Family/Caregiver: options, risks and benefits, expectations of cataract surgery, utilized an eye model with questions and answers to facilitate discussion.  Discussed lens options and patient understands that glasses may be required for optimal vision for distance and/or near vision after cataract surgery.  The Patient/Family/Caregiver  voice good understanding and patient wishes to proceed with surgery.  The patient will likely benefit from surgery and patient signed consent for Left Eye.   Will call Pt to schedule CE OS 1st for reading,                                            OD 2nd for distance.  Control DM.  D/c SCL wear x 2 wks.  RTC 10/4/23 for IOL Master.

## 2023-09-20 ENCOUNTER — TELEPHONE (OUTPATIENT)
Dept: DIABETES | Facility: CLINIC | Age: 73
End: 2023-09-20
Payer: MEDICARE

## 2023-09-20 NOTE — TELEPHONE ENCOUNTER
Spoke to pt stated her dexcom sensors will , stated that she need sensor until shipment arrives, stated to pt she can come  sensor from office

## 2023-09-22 ENCOUNTER — TELEPHONE (OUTPATIENT)
Dept: OPHTHALMOLOGY | Facility: CLINIC | Age: 73
End: 2023-09-22
Payer: MEDICARE

## 2023-09-22 ENCOUNTER — OFFICE VISIT (OUTPATIENT)
Dept: DIABETES | Facility: CLINIC | Age: 73
End: 2023-09-22
Payer: MEDICARE

## 2023-09-22 ENCOUNTER — PATIENT MESSAGE (OUTPATIENT)
Dept: OPTOMETRY | Facility: CLINIC | Age: 73
End: 2023-09-22
Payer: MEDICARE

## 2023-09-22 ENCOUNTER — TELEPHONE (OUTPATIENT)
Dept: DIABETES | Facility: CLINIC | Age: 73
End: 2023-09-22

## 2023-09-22 VITALS
DIASTOLIC BLOOD PRESSURE: 68 MMHG | SYSTOLIC BLOOD PRESSURE: 124 MMHG | WEIGHT: 176 LBS | HEART RATE: 79 BPM | OXYGEN SATURATION: 96 % | HEIGHT: 61 IN | BODY MASS INDEX: 33.23 KG/M2

## 2023-09-22 DIAGNOSIS — E78.5 DYSLIPIDEMIA, GOAL LDL BELOW 100: ICD-10-CM

## 2023-09-22 DIAGNOSIS — E66.09 CLASS 1 OBESITY DUE TO EXCESS CALORIES WITH SERIOUS COMORBIDITY AND BODY MASS INDEX (BMI) OF 33.0 TO 33.9 IN ADULT: ICD-10-CM

## 2023-09-22 DIAGNOSIS — E11.65 TYPE 2 DIABETES MELLITUS WITH HYPERGLYCEMIA, WITH LONG-TERM CURRENT USE OF INSULIN: Primary | ICD-10-CM

## 2023-09-22 DIAGNOSIS — H25.11 NS (NUCLEAR SCLEROSIS), RIGHT: Primary | ICD-10-CM

## 2023-09-22 DIAGNOSIS — I10 PRIMARY HYPERTENSION: ICD-10-CM

## 2023-09-22 DIAGNOSIS — Z71.9 HEALTH EDUCATION/COUNSELING: ICD-10-CM

## 2023-09-22 DIAGNOSIS — H25.12 NS (NUCLEAR SCLEROSIS), LEFT: Primary | ICD-10-CM

## 2023-09-22 DIAGNOSIS — Z79.4 TYPE 2 DIABETES MELLITUS WITH HYPERGLYCEMIA, WITH LONG-TERM CURRENT USE OF INSULIN: Primary | ICD-10-CM

## 2023-09-22 PROCEDURE — 99999 PR PBB SHADOW E&M-EST. PATIENT-LVL V: CPT | Mod: PBBFAC,,, | Performed by: NURSE PRACTITIONER

## 2023-09-22 PROCEDURE — 99214 PR OFFICE/OUTPT VISIT, EST, LEVL IV, 30-39 MIN: ICD-10-PCS | Mod: S$PBB,,, | Performed by: NURSE PRACTITIONER

## 2023-09-22 PROCEDURE — 99215 OFFICE O/P EST HI 40 MIN: CPT | Mod: PBBFAC,PN | Performed by: NURSE PRACTITIONER

## 2023-09-22 PROCEDURE — 99214 OFFICE O/P EST MOD 30 MIN: CPT | Mod: S$PBB,,, | Performed by: NURSE PRACTITIONER

## 2023-09-22 PROCEDURE — 99999 PR PBB SHADOW E&M-EST. PATIENT-LVL V: ICD-10-PCS | Mod: PBBFAC,,, | Performed by: NURSE PRACTITIONER

## 2023-09-22 RX ORDER — INSULIN ASPART INJECTION 100 [IU]/ML
INJECTION, SOLUTION SUBCUTANEOUS
Qty: 30 ML | Refills: 6 | Status: SHIPPED | OUTPATIENT
Start: 2023-09-22 | End: 2023-12-18

## 2023-09-22 RX ORDER — METFORMIN HYDROCHLORIDE 500 MG/1
500 TABLET, EXTENDED RELEASE ORAL 2 TIMES DAILY WITH MEALS
Qty: 180 TABLET | Refills: 2 | Status: SHIPPED | OUTPATIENT
Start: 2023-09-22 | End: 2023-12-11 | Stop reason: SDUPTHER

## 2023-09-22 RX ORDER — TIRZEPATIDE 7.5 MG/.5ML
7.5 INJECTION, SOLUTION SUBCUTANEOUS
Qty: 4 PEN | Refills: 0 | Status: SHIPPED | OUTPATIENT
Start: 2023-09-22 | End: 2024-01-30

## 2023-09-22 RX ORDER — SUB-Q INSULIN DEVICE, 40 UNIT
EACH MISCELLANEOUS
Qty: 30 EACH | Refills: 6 | Status: SHIPPED | OUTPATIENT
Start: 2023-09-22 | End: 2024-01-30 | Stop reason: SDUPTHER

## 2023-09-22 NOTE — ASSESSMENT & PLAN NOTE
Last A1c was well controlled   Unable to download the dexcom today -- issues with clarity on her phone-- staff resolved issue  She reports BG is high after meals-- up to 240's-250's   Of note, she reports that she sometimes wears the VGo for more than 24 hours   Reviewed proper VGo use   Discussed omnipod 5-- she wants to wait   She doesn't feel that she is losing weight on the ozempic-- will change to Mounjaro       -- Medication Changes:   Continue  Metformin  mg 2 times per day      Continue the VGo20 with Fiasp   Increase to 4 clicks with breakfast   Increase 5 clicks with lunch  Increase 4 clicks with dinner    Give clicks before the meals not after     Always wear VGo     Change the VGo every 24 hours -- same times every day     If you are having lows you need to let me know     Think about omnipod 5     Change the Ozempic to Mounjaro   Start with 7.5 mg weekly x 4 weeks-- let us know how things are going and we can stay at this dose or increase   With increasing the doses --  you may need to cut back on clicks with meals on th VGo   Let me know if lows happen       -- Reviewed goals of therapy are to get the best control we can without hypoglycemia.  -- Reviewed patient's current insulin regimen. Clarified proper insulin dose and timing in relation to meals, etc. Insulin injection sites and proper rotation instructed.    -- Advised frequent self blood glucose monitoring.  Patient encouraged to document glucose results and bring them to every clinic visit. Continue to use Dexcom G6   -- Hypoglycemia precautions discussed. Instructed on precautions before driving.    -- Call for Bg repeatedly < 70 or > 180.   -- Close adherence to lifestyle changes recommended.   -- Periodic follow ups for eye evaluations, foot care and dental care suggested.        Patient has diabetes mellitus and manages diabetes with intensive insulin regimen and uses prandial and basal insulin daily-- Via VGO   Patient requires a  therapeutic CGM and is willing to use therapeutic CGM for the necessary frequent adjustments of insulin therapy.  I have completed an in-person visit during the previous 6 months and will continue to have in-person visits every 6 months to assess adherence to their CGM regimen and diabetes treatment plan.  Due to COVID pandemic and need for remote monitoring this tool is medically necessary

## 2023-09-22 NOTE — ASSESSMENT & PLAN NOTE
Body mass index is 33.25 kg/m².  Increases insulin resistance.   Discussed DM diet and exercise.

## 2023-09-22 NOTE — PATIENT INSTRUCTIONS
Continue  Metformin  mg 2 times per day      Continue the VGo20 with Fiasp   Increase to 4 clicks with breakfast   Increase 5 clicks with lunch  Increase 4 clicks with dinner    Give clicks before the meals not after     Always wear VGo     Change the VGo every 24 hours -- same times every day     If you are having lows you need to let me know     Think about omnipod 5     Change the Ozempic to Mounjaro   Start with 7.5 mg weekly x 4 weeks-- let us know how things are going and we can stay at this dose or increase   With increasing the doses --  you may need to cut back on clicks with meals on th VGo   Let me know if lows happen    Cimetidine Counseling:  I discussed with the patient the risks of Cimetidine including but not limited to gynecomastia, headache, diarrhea, nausea, drowsiness, arrhythmias, pancreatitis, skin rashes, psychosis, bone marrow suppression and kidney toxicity.

## 2023-09-22 NOTE — PROGRESS NOTES
CC:   Chief Complaint   Patient presents with    Diabetes Mellitus       HPI: Skip Curran is a 73 y.o. female presents for a follow up visit today for the management of T2DM.     She was diagnosed with Type 2 diabetes around 4654-5598 on routine lab work. She was initially started on Metformin and then later started insulin therapy around 2010.     Family hx of diabetes: father, and maternal grandmother   Hospitalized for diabetes: denies     No personal or personal of pancreatic cancer or pancreatitis.   + sister with thyroid cancer - unknown the kind of thyroid cancer       Our last visit was in November of 2022.   We had an appointment in May of 2023- but it was canceled due to my family emergency   A1c in May was 6.7%   We are unable to download her dexcom today - due to issues with her clarity ana on her phone -- staff report that they resolved   She reports BG readings have been elevated after meals-- up to the 200's -- never over 240 or 250   She doesn't feel that she is losing weight on ozempic       DIABETES COMPLICATIONS: none      Diabetes Management Status    ASA:  No-- allergy to ASA- Hives     Statin: Taking- Crestor 20 mg nightly   ACE/ARB: Taking- Lisinopril 10 mg daily     Screening or Prevention Patient's value Goal Complete/Controlled?   HgA1C Testing and Control   Lab Results   Component Value Date    HGBA1C 6.7 (H) 05/23/2023      Annually/Less than 8% Yes   Lipid profile : 08/03/2023 Annually Yes   LDL control Lab Results   Component Value Date    LDLCALC 62.6 (L) 08/03/2023    Annually/Less than 100 mg/dl  Yes   Nephropathy screening Lab Results   Component Value Date    LABMICR 5.0 05/23/2023     Lab Results   Component Value Date    PROTEINUA Negative 11/21/2022    Annually No   Blood pressure BP Readings from Last 1 Encounters:   09/22/23 124/68    Less than 140/90 Yes   Dilated retinal exam : [place]18[place]2023[place] [facility]  number [phone] Annually No   Foot exam   : 09/22/2023 Annually No       CURRENT A1C:    Hemoglobin A1C   Date Value Ref Range Status   05/23/2023 6.7 (H) 4.0 - 5.6 % Final     Comment:     ADA Screening Guidelines:  5.7-6.4%  Consistent with prediabetes  >or=6.5%  Consistent with diabetes    High levels of fetal hemoglobin interfere with the HbA1C  assay. Heterozygous hemoglobin variants (HbS, HgC, etc)do  not significantly interfere with this assay.   However, presence of multiple variants may affect accuracy.     11/03/2022 6.8 (H) 4.0 - 5.6 % Final     Comment:     ADA Screening Guidelines:  5.7-6.4%  Consistent with prediabetes  >or=6.5%  Consistent with diabetes    High levels of fetal hemoglobin interfere with the HbA1C  assay. Heterozygous hemoglobin variants (HbS, HgC, etc)do  not significantly interfere with this assay.   However, presence of multiple variants may affect accuracy.     08/08/2022 6.7 (H) 4.0 - 5.6 % Final     Comment:     ADA Screening Guidelines:  5.7-6.4%  Consistent with prediabetes  >or=6.5%  Consistent with diabetes    High levels of fetal hemoglobin interfere with the HbA1C  assay. Heterozygous hemoglobin variants (HbS, HgC, etc)do  not significantly interfere with this assay.   However, presence of multiple variants may affect accuracy.         GOAL A1C: 6.5%-7% without hypoglycemia     DM MEDICATIONS USED IN THE PAST:  Metformin   Lantus, Janumet  Novolog 70/30  VGo  Dexcom   Metformin XR  Novolog   Humalog   Ozempic   Fiasp     CURRENT DIABETES MEDICATIONS: Metformin  mg BID and  VGo20 with Fiasp 3 clicks with breakfast 4 clicks with lunch, 3 clicks with dinner  2 clicks if BG is high   Giving clicks after meals   Ozempic 1 mg weekly on Thursdays   Insulin:  VGo  Missed doses: denies   Wearing VGO to abdomen.   Changing VGo every 24 hours.  No skin irritation     VGo from [employer].       BLOOD GLUCOSE MONITORING:   Sensor type: Dexcom G6   Site change:   q10  days      Dexcom supplies from [employer]         HYPOGLYCEMIA: rarely    Glucagon kit: denies - lives alone   Medic alert bracelet: denies   Drinks OJ       MEALS: eating 3 meals per day   BF: toast or bagel and coffee- with sweet and low and SF creamer   Lunch: sister in law cooks every day   Dinner: home cooked as well.   Snack: occ on peanut, occ candy   Drinks: water or diet drinks        CURRENT EXERCISE:  No    Review of Systems  Review of Systems   Constitutional:  Negative for appetite change and fatigue.   HENT:  Negative for trouble swallowing.    Eyes:  Negative for visual disturbance.   Respiratory:  Negative for shortness of breath.    Cardiovascular:  Negative for chest pain.   Gastrointestinal:  Negative for nausea.   Endocrine: Negative for polydipsia, polyphagia and polyuria.   Genitourinary:         No Nocturia    Musculoskeletal:  Positive for arthralgias, back pain and joint swelling.        Right knee swelling    Skin:  Negative for wound.   Neurological:  Negative for numbness.       Physical Exam   Physical Exam  Vitals and nursing note reviewed.   Constitutional:       Appearance: She is well-developed. She is obese.   HENT:      Head: Normocephalic and atraumatic.      Right Ear: External ear normal.      Left Ear: External ear normal.      Nose: Nose normal.   Neck:      Thyroid: No thyromegaly.      Trachea: No tracheal deviation.   Cardiovascular:      Rate and Rhythm: Normal rate and regular rhythm.      Heart sounds: No murmur heard.  Pulmonary:      Effort: Pulmonary effort is normal. No respiratory distress.      Breath sounds: Normal breath sounds.   Abdominal:      Palpations: Abdomen is soft.      Tenderness: There is no abdominal tenderness.      Hernia: No hernia is present.   Musculoskeletal:      Cervical back: Normal range of motion and neck supple.   Skin:     General: Skin is warm and dry.      Capillary Refill: Capillary refill takes less than 2 seconds.      Findings: No  rash.      Comments: VGo and Dexcom sites are normal appearing. No lipo hypertropthy or atrophy     Neurological:      Mental Status: She is alert and oriented to person, place, and time.      Cranial Nerves: No cranial nerve deficit.   Psychiatric:         Behavior: Behavior normal.         Judgment: Judgment normal.         FOOT EXAMINATION: Appropriate footwear     Protective Sensation (w/ 10 gram monofilament):  Right: Intact  Left: Intact    Visual Inspection:  Normal -  Bilateral and Nails Intact - with Evidence of Foot Deformity- Bilateral    Pedal Pulses:   Right: Present  Left: Present    Posterior Tibialis Pulses:   Right:Present  Left: Present        Lab Results   Component Value Date    TSH 1.809 08/03/2023         Type 2 diabetes mellitus with hyperglycemia, with long-term current use of insulin  Last A1c was well controlled   Unable to download the dexcom today -- issues with clarity on her phone-- staff resolved issue  She reports BG is high after meals-- up to 240's-250's   Of note, she reports that she sometimes wears the VGo for more than 24 hours   Reviewed proper VGo use   Discussed omnipod 5-- she wants to wait   She doesn't feel that she is losing weight on the ozempic-- will change to Mounjaro       -- Medication Changes:   Continue  Metformin  mg 2 times per day      Continue the VGo20 with Fiasp   Increase to 4 clicks with breakfast   Increase 5 clicks with lunch  Increase 4 clicks with dinner    Give clicks before the meals not after     Always wear VGo     Change the VGo every 24 hours -- same times every day     If you are having lows you need to let me know     Think about omnipod 5     Change the Ozempic to Mounjaro   Start with 7.5 mg weekly x 4 weeks-- let us know how things are going and we can stay at this dose or increase   With increasing the doses --  you may need to cut back on clicks with meals on th VGo   Let me know if lows happen       -- Reviewed goals of therapy are  to get the best control we can without hypoglycemia.  -- Reviewed patient's current insulin regimen. Clarified proper insulin dose and timing in relation to meals, etc. Insulin injection sites and proper rotation instructed.    -- Advised frequent self blood glucose monitoring.  Patient encouraged to document glucose results and bring them to every clinic visit. Continue to use Dexcom G6   -- Hypoglycemia precautions discussed. Instructed on precautions before driving.    -- Call for Bg repeatedly < 70 or > 180.   -- Close adherence to lifestyle changes recommended.   -- Periodic follow ups for eye evaluations, foot care and dental care suggested.        Patient has diabetes mellitus and manages diabetes with intensive insulin regimen and uses prandial and basal insulin daily-- Via VGO   Patient requires a therapeutic CGM and is willing to use therapeutic CGM for the necessary frequent adjustments of insulin therapy.  I have completed an in-person visit during the previous 6 months and will continue to have in-person visits every 6 months to assess adherence to their CGM regimen and diabetes treatment plan.  Due to COVID pandemic and need for remote monitoring this tool is medically necessary      HTN (hypertension)  BP goal is < 140/90.   Tolerating ACEi  Controlled   Blood pressure goals discussed with patient      Dyslipidemia, goal LDL below 100  On statin per ADA recommendations  LDL goal < 100. LDL at goal. LFTs WNL. Continue statin.         Class 1 obesity due to excess calories with serious comorbidity and body mass index (BMI) of 33.0 to 33.9 in adult  Body mass index is 33.25 kg/m².  Increases insulin resistance.   Discussed DM diet and exercise.           I spent a total of 30 minutes on the day of the visit.This includes face to face time and non-face to face time preparing to see the patient (eg, review of tests), obtaining and/or reviewing separately obtained history, documenting clinical information  in the electronic or other health record, independently interpreting results and communicating results to the patient/family/caregiver, or care coordinator.          Follow up in about 3 months (around 12/22/2023).-   Follow up with me in 3 month with labs prior       Orders Placed This Encounter   Procedures    Hemoglobin A1C     Standing Status:   Future     Standing Expiration Date:   3/22/2025    Comprehensive Metabolic Panel     Standing Status:   Future     Standing Expiration Date:   3/22/2025    Lipid Panel     Standing Status:   Future     Standing Expiration Date:   3/22/2025    Microalbumin/Creatinine Ratio, Urine     Standing Status:   Future     Standing Expiration Date:   3/22/2025     Order Specific Question:   Specimen Source     Answer:   Urine         Recommendations were discussed with the patient in detail  The patient verbalized understanding and agrees with the plan outlined as above.     This note was partly generated with Ghost voice recognition software. I apologize for any possible typographical errors.

## 2023-09-23 DIAGNOSIS — H25.13 NUCLEAR SCLEROTIC CATARACT OF BOTH EYES: Primary | ICD-10-CM

## 2023-09-23 RX ORDER — PREDNISOLONE ACETATE 10 MG/ML
1 SUSPENSION/ DROPS OPHTHALMIC 4 TIMES DAILY
Qty: 5 ML | Refills: 1 | Status: SHIPPED | OUTPATIENT
Start: 2023-10-31 | End: 2023-11-30

## 2023-09-23 RX ORDER — OFLOXACIN 3 MG/ML
1 SOLUTION/ DROPS OPHTHALMIC 4 TIMES DAILY
Qty: 5 ML | Refills: 1 | Status: SHIPPED | OUTPATIENT
Start: 2023-10-28 | End: 2023-11-07

## 2023-09-23 RX ORDER — KETOROLAC TROMETHAMINE 5 MG/ML
1 SOLUTION OPHTHALMIC 4 TIMES DAILY
Qty: 5 ML | Refills: 1 | Status: SHIPPED | OUTPATIENT
Start: 2023-10-28 | End: 2023-11-27

## 2023-09-25 ENCOUNTER — TELEPHONE (OUTPATIENT)
Dept: DIABETES | Facility: CLINIC | Age: 73
End: 2023-09-25
Payer: MEDICARE

## 2023-09-28 ENCOUNTER — TELEPHONE (OUTPATIENT)
Dept: DIABETES | Facility: CLINIC | Age: 73
End: 2023-09-28
Payer: MEDICARE

## 2023-10-04 ENCOUNTER — OFFICE VISIT (OUTPATIENT)
Dept: OPHTHALMOLOGY | Facility: CLINIC | Age: 73
End: 2023-10-04
Payer: MEDICARE

## 2023-10-04 DIAGNOSIS — H26.9 CORTICAL CATARACT OF BOTH EYES: ICD-10-CM

## 2023-10-04 DIAGNOSIS — H25.13 NUCLEAR SCLEROTIC CATARACT OF BOTH EYES: Primary | ICD-10-CM

## 2023-10-04 PROCEDURE — 92136 BIOMETRY: ICD-10-PCS | Mod: 26,S$PBB,LT, | Performed by: OPHTHALMOLOGY

## 2023-10-04 PROCEDURE — 99999 PR PBB SHADOW E&M-EST. PATIENT-LVL I: ICD-10-PCS | Mod: PBBFAC,,, | Performed by: OPHTHALMOLOGY

## 2023-10-04 PROCEDURE — 92136 OPHTHALMIC BIOMETRY: CPT | Mod: PBBFAC | Performed by: OPHTHALMOLOGY

## 2023-10-04 PROCEDURE — 99999 PR PBB SHADOW E&M-EST. PATIENT-LVL I: CPT | Mod: PBBFAC,,, | Performed by: OPHTHALMOLOGY

## 2023-10-04 PROCEDURE — 99499 NO LOS: ICD-10-PCS | Mod: S$PBB,,, | Performed by: OPHTHALMOLOGY

## 2023-10-04 PROCEDURE — 99499 UNLISTED E&M SERVICE: CPT | Mod: S$PBB,,, | Performed by: OPHTHALMOLOGY

## 2023-10-04 PROCEDURE — 99211 OFF/OP EST MAY X REQ PHY/QHP: CPT | Mod: PBBFAC | Performed by: OPHTHALMOLOGY

## 2023-10-04 NOTE — PROGRESS NOTES
Subjective:       Patient ID: Skip Curran is a 73 y.o. female.    Chief Complaint: No chief complaint on file.             Assessment:       1. Nuclear sclerotic cataract of both eyes    2. Cortical cataract of both eyes        Plan:       Visually significant cataract OU -Pt. Wants Sx. Pt is here for IOL Master.      CE OS 10/31/23 CNAOTO 27.0 for reading,        OD 11/14/23 CNAOTO 24.5 for distance vision.

## 2023-10-05 ENCOUNTER — TELEPHONE (OUTPATIENT)
Dept: OPHTHALMOLOGY | Facility: CLINIC | Age: 73
End: 2023-10-05
Payer: MEDICARE

## 2023-10-12 ENCOUNTER — TELEPHONE (OUTPATIENT)
Dept: DIABETES | Facility: CLINIC | Age: 73
End: 2023-10-12
Payer: MEDICARE

## 2023-10-16 ENCOUNTER — HOSPITAL ENCOUNTER (OUTPATIENT)
Dept: RADIOLOGY | Facility: HOSPITAL | Age: 73
Discharge: HOME | End: 2023-10-16
Attending: PHYSICAL MEDICINE & REHABILITATION
Payer: MEDICARE

## 2023-10-16 DIAGNOSIS — Z12.31 ENCOUNTER FOR SCREENING MAMMOGRAM FOR MALIGNANT NEOPLASM OF BREAST: ICD-10-CM

## 2023-10-16 PROCEDURE — 77067 MAMMO DIGITAL SCREENING BILAT WITH TOMO: ICD-10-PCS | Mod: 26,,, | Performed by: RADIOLOGY

## 2023-10-16 PROCEDURE — 77067 SCR MAMMO BI INCL CAD: CPT | Mod: TC

## 2023-10-16 PROCEDURE — 77063 BREAST TOMOSYNTHESIS BI: CPT | Mod: 26,,, | Performed by: RADIOLOGY

## 2023-10-16 PROCEDURE — 77063 MAMMO DIGITAL SCREENING BILAT WITH TOMO: ICD-10-PCS | Mod: 26,,, | Performed by: RADIOLOGY

## 2023-10-16 PROCEDURE — 77067 SCR MAMMO BI INCL CAD: CPT | Mod: 26,,, | Performed by: RADIOLOGY

## 2023-10-19 DIAGNOSIS — Z79.4 TYPE 2 DIABETES MELLITUS WITH HYPERGLYCEMIA, WITH LONG-TERM CURRENT USE OF INSULIN: ICD-10-CM

## 2023-10-19 DIAGNOSIS — E11.65 TYPE 2 DIABETES MELLITUS WITH HYPERGLYCEMIA, WITH LONG-TERM CURRENT USE OF INSULIN: ICD-10-CM

## 2023-10-19 RX ORDER — TIRZEPATIDE 7.5 MG/.5ML
7.5 INJECTION, SOLUTION SUBCUTANEOUS
Refills: 0 | OUTPATIENT
Start: 2023-10-19

## 2023-10-19 RX ORDER — TIRZEPATIDE 10 MG/.5ML
10 INJECTION, SOLUTION SUBCUTANEOUS
Qty: 4 PEN | Refills: 4 | Status: SHIPPED | OUTPATIENT
Start: 2023-10-19 | End: 2023-11-15 | Stop reason: DRUGHIGH

## 2023-10-24 ENCOUNTER — TELEPHONE (OUTPATIENT)
Dept: OPHTHALMOLOGY | Facility: CLINIC | Age: 73
End: 2023-10-24
Payer: MEDICARE

## 2023-10-29 NOTE — H&P
[facility]  History & Physical    Subjective:      Chief Complaint/Reason for Admission:     Skip Curran is a 73 y.o. female.    Past Medical History:   Diagnosis Date    Allergy     Arthritis     Asthma     Back injuries     two broken bones that healed on own    Cataract     Depression     Diabetes mellitus     Diabetes mellitus type I     GERD (gastroesophageal reflux disease)     Hypercholesteremia     Hypertension     Insomnia     Slow to wake up after anesthesia 2002    Orthoscope of knee     Past Surgical History:   Procedure Laterality Date    anal fissure surgery      BELPHAROPTOSIS REPAIR Bilateral 11/11/2022    Procedure: REPAIR, BLEPHAROPTOSIS;  Surgeon: Francheska Arechiga MD;  Location: [address];  Service: Ophthalmology;  Laterality: Bilateral;    BREAST BIOPSY Right     benign    BRONCHOSCOPY N/A 10/22/2019    Procedure: bronch floro , noon;  Surgeon: Pepe Andersen MD;  Location: [facility];  Service: Endoscopy;  Laterality: N/A;    COLONOSCOPY  09/18/2020    COLONOSCOPY N/A 9/18/2020    Procedure: COLONOSCOPY;  Surgeon: Magno Gant MD;  Location: [facility];  Service: Colon and Rectal;  Laterality: N/A;    HYSTERECTOMY      KNEE ARTHROPLASTY Left 11/28/2022    Procedure: ARTHROPLASTY, KNEE TOTAL;  Surgeon: Jules Carolina MD;  Location: [facility];  Service: Orthopedics;  Laterality: Left;    KNEE ARTHROSCOPY W/ DEBRIDEMENT Left     x3    LIPOMA RESECTION      fatty tumor between breast    SHOULDER SURGERY Left 2/11/2016    Dr Burris     TONSILLECTOMY      TOTAL KNEE ARTHROPLASTY Right 3/18/2021    Procedure: ARTHROPLASTY, KNEE, TOTAL;  Surgeon: Rashi De La O MD;  Location: [facility];  Service: Orthopedics;  Laterality: Right;  ADDUCTOR BLOCK    WRIST RECONSTRUCTION Right     tendon       Family history non-contributory to current problem   Social History     Tobacco Use    Smoking status: Never    Smokeless tobacco: Never   Substance Use Topics    Alcohol  use: Yes     Alcohol/week: 1.0 standard drink of alcohol     Types: 1 Shots of liquor per week     Comment: socially    Drug use: No       No medications prior to admission.     Review of patient's allergies indicates:   Allergen Reactions    Aspirin Hives    Aleve [naproxen sodium] Hives    Iodinated contrast media     Iodine Other (See Comments)     WHEN INJECTED- pt  states she coded    Pcn [penicillins] Rash        Review of Systems   Eyes:  Positive for blurred vision.   All other systems reviewed and are negative.      Objective:      Vital Signs (Most Recent)       Vital Signs Range (Last 24H):  BP: ()/()   Arterial Line BP: ()/()     Physical Exam  Constitutional:       Appearance: She is well-developed.   HENT:      Head: Normocephalic.   Eyes:      Conjunctiva/sclera: Conjunctivae normal.      Pupils: Pupils are equal, round, and reactive to light.   Cardiovascular:      Rate and Rhythm: Normal rate and regular rhythm.      Heart sounds: Normal heart sounds.   Pulmonary:      Effort: Pulmonary effort is normal.      Breath sounds: Normal breath sounds.   Abdominal:      General: Bowel sounds are normal.      Palpations: Abdomen is soft.   Musculoskeletal:         General: Normal range of motion.      Cervical back: Normal range of motion and neck supple.   Skin:     General: Skin is warm.   Neurological:      Mental Status: She is alert and oriented to person, place, and time.         Data Review:     ECG:     Assessment:      Cataract OS.    Plan:    CE OS.

## 2023-10-30 NOTE — PRE-PROCEDURE INSTRUCTIONS
The following was discussed with pt via phone and sent to pt portal. Pt verbalized understanding.     - Nothing to eat or drink after midnight the night before your surgery, except AM meds with small sips of water     - HOLD all Diabetic meds AM of surgery  - HOLD all Insulin AM of surgery  - HOLD all Fluid pills AM of surgery  - HOLD all non-insulin shots until after surgery (Ozempic, Mounjaro, Trulicity, Victoza, Byetta, Wegovy and Adlyxin) (up to 7 days prior)  - HOLD all vits and herbal meds AM of surgery  - HOLD blood thinner meds AM of surgery        - TAKE all B/P meds, EXCEPT those that contain a fluid pill  - USE inhalers as needed and bring AM of surgery  - USE eye drops as directed     - Shower and wash face with dial soap for 3 mins PM prior and AM of surgery  - No powder, lotions, creams, oils, gels, ointments, makeup,  or jewelry    - Wear comfortable clothing (button up shirt)     (Patients ride may not leave while patient is in surgery)     -- 2nd floor surgery center, [facility], located @ [address] [place] [address]

## 2023-10-31 ENCOUNTER — ANESTHESIA EVENT (OUTPATIENT)
Dept: SURGERY | Facility: HOSPITAL | Age: 73
End: 2023-10-31
Payer: MEDICARE

## 2023-10-31 ENCOUNTER — HOSPITAL ENCOUNTER (OUTPATIENT)
Facility: HOSPITAL | Age: 73
Discharge: HOME | End: 2023-10-31
Attending: OPHTHALMOLOGY | Admitting: OPHTHALMOLOGY
Payer: MEDICARE

## 2023-10-31 ENCOUNTER — ANESTHESIA (OUTPATIENT)
Dept: SURGERY | Facility: HOSPITAL | Age: 73
End: 2023-10-31
Payer: MEDICARE

## 2023-10-31 VITALS
RESPIRATION RATE: 20 BRPM | TEMPERATURE: 98 F | HEART RATE: 72 BPM | SYSTOLIC BLOOD PRESSURE: 148 MMHG | DIASTOLIC BLOOD PRESSURE: 70 MMHG | OXYGEN SATURATION: 97 %

## 2023-10-31 DIAGNOSIS — H25.12 NUCLEAR SCLEROTIC CATARACT OF LEFT EYE: Primary | ICD-10-CM

## 2023-10-31 LAB — POCT GLUCOSE: 112 MG/DL (ref 70–110)

## 2023-10-31 PROCEDURE — 27201423 OPTIME MED/SURG SUP & DEVICES STERILE SUPPLY: Performed by: OPHTHALMOLOGY

## 2023-10-31 PROCEDURE — 37000009 HC ANESTHESIA EA ADD 15 MINS: Performed by: OPHTHALMOLOGY

## 2023-10-31 PROCEDURE — 71000015 HC POSTOP RECOV 1ST HR: Performed by: OPHTHALMOLOGY

## 2023-10-31 PROCEDURE — D9220A PRA ANESTHESIA: ICD-10-PCS | Mod: ,,, | Performed by: NURSE ANESTHETIST, CERTIFIED REGISTERED

## 2023-10-31 PROCEDURE — 66984 XCAPSL CTRC RMVL W/O ECP: CPT | Mod: LT,,, | Performed by: OPHTHALMOLOGY

## 2023-10-31 PROCEDURE — D9220A PRA ANESTHESIA: Mod: ,,, | Performed by: NURSE ANESTHETIST, CERTIFIED REGISTERED

## 2023-10-31 PROCEDURE — V2632 POST CHMBR INTRAOCULAR LENS: HCPCS | Performed by: OPHTHALMOLOGY

## 2023-10-31 PROCEDURE — 63600175 PHARM REV CODE 636 W HCPCS: Performed by: OPHTHALMOLOGY

## 2023-10-31 PROCEDURE — 94761 N-INVAS EAR/PLS OXIMETRY MLT: CPT

## 2023-10-31 PROCEDURE — 66984 PR REMOVAL, CATARACT, W/INSRT INTRAOC LENS, W/O ENDO CYCLO: ICD-10-PCS | Mod: LT,,, | Performed by: OPHTHALMOLOGY

## 2023-10-31 PROCEDURE — 99900035 HC TECH TIME PER 15 MIN (STAT)

## 2023-10-31 PROCEDURE — 82962 GLUCOSE BLOOD TEST: CPT | Performed by: OPHTHALMOLOGY

## 2023-10-31 PROCEDURE — 36000706: Performed by: OPHTHALMOLOGY

## 2023-10-31 PROCEDURE — 63600175 PHARM REV CODE 636 W HCPCS: Performed by: NURSE ANESTHETIST, CERTIFIED REGISTERED

## 2023-10-31 PROCEDURE — 37000008 HC ANESTHESIA 1ST 15 MINUTES: Performed by: OPHTHALMOLOGY

## 2023-10-31 PROCEDURE — 36000707: Performed by: OPHTHALMOLOGY

## 2023-10-31 PROCEDURE — 25000003 PHARM REV CODE 250: Performed by: OPHTHALMOLOGY

## 2023-10-31 DEVICE — IMPLANTABLE DEVICE: Type: IMPLANTABLE DEVICE | Site: EYE | Status: FUNCTIONAL

## 2023-10-31 RX ORDER — TETRACAINE HYDROCHLORIDE 5 MG/ML
SOLUTION OPHTHALMIC
Status: DISCONTINUED | OUTPATIENT
Start: 2023-10-31 | End: 2023-10-31 | Stop reason: HOSPADM

## 2023-10-31 RX ORDER — MOXIFLOXACIN 5 MG/ML
1 SOLUTION/ DROPS OPHTHALMIC
Status: DISPENSED | OUTPATIENT
Start: 2023-10-31 | End: 2023-10-31

## 2023-10-31 RX ORDER — PROCHLORPERAZINE EDISYLATE 5 MG/ML
5 INJECTION INTRAMUSCULAR; INTRAVENOUS EVERY 30 MIN PRN
Status: DISCONTINUED | OUTPATIENT
Start: 2023-10-31 | End: 2023-10-31 | Stop reason: HOSPADM

## 2023-10-31 RX ORDER — MIDAZOLAM HYDROCHLORIDE 1 MG/ML
INJECTION, SOLUTION INTRAMUSCULAR; INTRAVENOUS
Status: DISCONTINUED | OUTPATIENT
Start: 2023-10-31 | End: 2023-10-31

## 2023-10-31 RX ORDER — SODIUM CHLORIDE 9 MG/ML
INJECTION, SOLUTION INTRAVENOUS CONTINUOUS
Status: DISCONTINUED | OUTPATIENT
Start: 2023-10-31 | End: 2023-10-31 | Stop reason: HOSPADM

## 2023-10-31 RX ORDER — EPINEPHRINE 1 MG/ML
INJECTION, SOLUTION, CONCENTRATE INTRAVENOUS
Status: DISCONTINUED | OUTPATIENT
Start: 2023-10-31 | End: 2023-10-31 | Stop reason: HOSPADM

## 2023-10-31 RX ORDER — OXYCODONE HYDROCHLORIDE 5 MG/1
5 TABLET ORAL
Status: DISCONTINUED | OUTPATIENT
Start: 2023-10-31 | End: 2023-10-31

## 2023-10-31 RX ORDER — LIDOCAINE HYDROCHLORIDE 40 MG/ML
INJECTION, SOLUTION RETROBULBAR
Status: DISCONTINUED | OUTPATIENT
Start: 2023-10-31 | End: 2023-10-31 | Stop reason: HOSPADM

## 2023-10-31 RX ORDER — ACETAMINOPHEN 325 MG/1
650 TABLET ORAL EVERY 4 HOURS PRN
Status: DISCONTINUED | OUTPATIENT
Start: 2023-10-31 | End: 2023-10-31 | Stop reason: HOSPADM

## 2023-10-31 RX ORDER — OFLOXACIN 3 MG/ML
SOLUTION/ DROPS OPHTHALMIC
Status: DISCONTINUED | OUTPATIENT
Start: 2023-10-31 | End: 2023-10-31 | Stop reason: HOSPADM

## 2023-10-31 RX ORDER — CYCLOP/TROP/PROPA/PHEN/KET/WAT 1-1-0.1%
1 DROPS (EA) OPHTHALMIC (EYE)
Status: COMPLETED | OUTPATIENT
Start: 2023-10-31 | End: 2023-10-31

## 2023-10-31 RX ORDER — HYDROCODONE BITARTRATE AND ACETAMINOPHEN 5; 325 MG/1; MG/1
1 TABLET ORAL EVERY 4 HOURS PRN
Status: DISCONTINUED | OUTPATIENT
Start: 2023-10-31 | End: 2023-10-31 | Stop reason: HOSPADM

## 2023-10-31 RX ORDER — PREDNISOLONE ACETATE 10 MG/ML
SUSPENSION/ DROPS OPHTHALMIC
Status: DISCONTINUED | OUTPATIENT
Start: 2023-10-31 | End: 2023-10-31 | Stop reason: HOSPADM

## 2023-10-31 RX ADMIN — Medication 1 DROP: at 07:10

## 2023-10-31 RX ADMIN — MIDAZOLAM 0.5 MG: 1 INJECTION INTRAMUSCULAR; INTRAVENOUS at 09:10

## 2023-10-31 RX ADMIN — MOXIFLOXACIN OPHTHALMIC 1 DROP: 5 SOLUTION/ DROPS OPHTHALMIC at 07:10

## 2023-10-31 RX ADMIN — MIDAZOLAM 1 MG: 1 INJECTION INTRAMUSCULAR; INTRAVENOUS at 09:10

## 2023-10-31 NOTE — OP NOTE
Operative Date:  10/31/2023    Discharge Date:  10/31/2023    Discharge Patient Home    Report Title: Operative Note      SURGEON: Dimas Sheppard MD     ASSISTANT:     PREOPERATIVE DIAGNOSIS: Visually significant NSC cataract,  Left Eye.    POSTOPERATIVE DIAGNOSIS: Visually-significant NSC cataract,  Left Eye.    PROCEDURE PERFORMED: Phacoemulsification of the cataract with posterior chamber intraocular lens Left Eye.    ANESTHESIA: Topical with MAC     COMPLICATIONS: None    ESTIMATED BLOOD LOSS: Minimal    INDICATIONS FOR PROCEDURE:   The patient is a pleasant 73 year old woman with increasing difficulties with activities of daily living secondary to a dense visually significant cataract in the Left Eye.  Discussions have been carried out with this patient concerning the options to surgery, risks, benefits and expectations.  The patient voiced good understanding and wished to proceed with the above procedure.    PROCEDURE IN DETAIL: The patient was brought to the operating room and received topical anesthetic to the eye and then was prepped and draped in the usual sterile fashion.  Using the Jerry ring and the guarded karen blade set at 0.37 mm, a partial thickness clear cornea incision was made temporally.  The paracentesis site was made at the six o'clock position.  Omidria was injected into the anterior chamber through the paracentesis.  Viscoat was then injected into the anterior chamber.  The eye was then reentered at the primary surgical site with a 2.4 mm keratome followed by continuous capsulotomy, hydrodissection, hydrodelineation and phacoemulsification of the cataract.  Residual cortical material was removed using automated irrigation-aspiration technique.  Healon was injected into the posterior chamber and a CNAOTO 27.0 diopter lens was placed in the bag without difficulty. Residual viscoelastic was removed using automated irrigation-aspiration technique. The eye was re-pressurized using  BSS solution and both the paracentesis site and the primary surgical site were demonstrated to be watertight at the end of the case with Weck--Janell manipulation.  One drop of Ofloxacin and one drop of Pred acetate 1% was applied to the Left Eye .The eye was closed, patched and a Galeana shield placed.  The patient was taken to the recovery room in good and stable condition.  The patient tolerated the procedure well.  The patient was instructed to refrain from any heavy lifting, bending, stooping or straining activities, discharged home  and to follow-up in the morning for routine postoperative care with Dimas Sheppard MD.

## 2023-10-31 NOTE — ANESTHESIA POSTPROCEDURE EVALUATION
Anesthesia Post Evaluation    Patient: Skip Curran    Procedure(s) Performed: Procedure(s) (LRB):  EXTRACTION, CATARACT, WITH IOL INSERTION (Left)    Final Anesthesia Type: MAC      Patient location during evaluation: PACU  Patient participation: Yes- Able to Participate  Level of consciousness: awake and alert  Post-procedure vital signs: reviewed and stable  Pain management: adequate  Airway patency: patent  ROSA mitigation strategies: Multimodal analgesia  PONV status at discharge: No PONV  Anesthetic complications: no      Cardiovascular status: hemodynamically stable  Respiratory status: spontaneous ventilation and room air  Hydration status: euvolemic  Follow-up not needed.          Vitals Value Taken Time   /70 10/31/23 1032   Temp 36.8 °C (98.2 °F) 10/31/23 1016   Pulse 68 10/31/23 1034   Resp 20 10/31/23 1030   SpO2 98 % 10/31/23 1034   Vitals shown include unvalidated device data.      No case tracking events are documented in the log.      Pain/Ewa Score: Ewa Score: 10 (10/31/2023 10:26 AM)

## 2023-10-31 NOTE — BRIEF OP NOTE
[facility]  Brief Operative Note    Surgery Date: 10/31/2023     Surgeon(s) and Role:     * Dimas Sheppard MD - Primary    Assisting Surgeon: None    Pre-op Diagnosis:  NS (nuclear sclerosis), left [H25.12]    Post-op Diagnosis:  Post-Op Diagnosis Codes:     * NS (nuclear sclerosis), left [H25.12]    Procedure(s) (LRB):  EXTRACTION, CATARACT, WITH IOL INSERTION (Left)    Anesthesia: Local MAC    Operative Findings:     Estimated Blood Loss: * No values recorded between 10/31/2023  9:54 AM and 10/31/2023 10:15 AM *         Specimens:   Specimen (24h ago, onward)      None              Discharge Note    OUTCOME: Patient tolerated treatment/procedure well without complication and is now ready for discharge.    DISPOSITION: Home or Self Care    FINAL DIAGNOSIS:  Nuclear sclerotic cataract of left eye    FOLLOWUP: In clinic    DISCHARGE INSTRUCTIONS:    Discharge Procedure Orders   Other restrictions (specify):   Order Comments: No heavy lifting or bending for 1 week.
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
## 2023-10-31 NOTE — DISCHARGE INSTRUCTIONS
Cataract Post Surgery Instructions     START YOUR DROPS AS SOON AS YOU GET HOME FROM SURGERY      Instill the following drops 1 drop, three (3) times daily, every four (4) hours.      FIVE MINUTES APART FROM EACH OTHER      OFLOXACIN  ( [place])    KETOROLAC  ( [place])    PREDNISOLONE ACETATE  ( [place] or [place])         RESTRICTIONS FOR SEVEN (7) DAYS FOLLOWING SURGERY     DO NOT lift anything over 10 pounds.     DO NOT bend at the waist, only at the knees.      DO NOT rub your eye.      DO NOT get any water into the eye.      DO NOT wear any makeup, lotions, or creams on/around the eye.     Wear the eye shield you were given after surgery anytime you go to sleep.  You may remove the shield while awake.           NOTE:     YOU MAY resume taking ALL your medications after surgery.     YOU MAY resume driving on your first post-operative appointment IF your vision is clear. DO NOT wear your eye shield while driving for your post operative appointments.      YOU MAY take an over-the counter pain medication such as Tylenol or Ibuprofen as needed for pain.     CALL US:  MILD DISCOMFORT IS NORMAL. HOWEVER, IF YOU EXPERIENCE SEVERE THROBBING PAIN, LOSS OF VISION, ONSET OF FLASHES AND/OR FLOATERS- CALL DR. DUONG OFFICE: [phone]/ AFTER HOUR [phone] OR PROCEED TO THE EMERGENCY ROOM.

## 2023-10-31 NOTE — TRANSFER OF CARE
Anesthesia Transfer of Care Note    Patient: Skip Curran    Procedure(s) Performed: Procedure(s) (LRB):  EXTRACTION, CATARACT, WITH IOL INSERTION (Left)    Patient location: PACU    Anesthesia Type: MAC    Transport from OR: Transported from OR on room air with adequate spontaneous ventilation    Post pain: adequate analgesia    Post assessment: no apparent anesthetic complications    Post vital signs: stable    Level of consciousness: awake    Nausea/Vomiting: no nausea/vomiting    Complications: none          Last vitals:   Visit Vitals  BP (!) 167/74 (BP Location: Left arm, Patient Position: Sitting)   Pulse 70   Temp 37 °C (98.6 °F) (Temporal)   Resp 20   SpO2 99%   Breastfeeding No

## 2023-11-01 ENCOUNTER — OFFICE VISIT (OUTPATIENT)
Dept: OPHTHALMOLOGY | Facility: CLINIC | Age: 73
End: 2023-11-01
Payer: MEDICARE

## 2023-11-01 DIAGNOSIS — Z98.890 POST-OPERATIVE STATE: Primary | ICD-10-CM

## 2023-11-01 PROCEDURE — 99999 PR PBB SHADOW E&M-EST. PATIENT-LVL II: CPT | Mod: PBBFAC,,, | Performed by: OPHTHALMOLOGY

## 2023-11-01 PROCEDURE — 99212 OFFICE O/P EST SF 10 MIN: CPT | Mod: PBBFAC | Performed by: OPHTHALMOLOGY

## 2023-11-01 PROCEDURE — 99024 PR POST-OP FOLLOW-UP VISIT: ICD-10-PCS | Mod: POP,,, | Performed by: OPHTHALMOLOGY

## 2023-11-01 PROCEDURE — 99024 POSTOP FOLLOW-UP VISIT: CPT | Mod: POP,,, | Performed by: OPHTHALMOLOGY

## 2023-11-01 PROCEDURE — 99999 PR PBB SHADOW E&M-EST. PATIENT-LVL II: ICD-10-PCS | Mod: PBBFAC,,, | Performed by: OPHTHALMOLOGY

## 2023-11-01 NOTE — PROGRESS NOTES
Subjective:       Patient ID: Skip Curran is a 73 y.o. female.    Chief Complaint: Post-op Evaluation    HPI    Pt here for 1 day Post Op OS  Pt state no complaints at this time   Denies f/f    Gtts:  Oflox qid OS  Pred qid OS  Keto qid OS   Last edited by Fabian Silva on 11/1/2023  9:47 AM.             Assessment:       1. Post-operative state        Plan:       S/p CE OS- Doing well.         CPM OS.  RTC 1 wk.

## 2023-11-02 ENCOUNTER — TELEPHONE (OUTPATIENT)
Dept: OPHTHALMOLOGY | Facility: CLINIC | Age: 73
End: 2023-11-02
Payer: MEDICARE

## 2023-11-02 NOTE — TELEPHONE ENCOUNTER
----- Message from Herminia Patel sent at 11/2/2023 11:09 AM CDT -----  Regarding: FW: Reschedule Upcoming Surg  Contact: Pt    ----- Message -----  From: Sushma Farnsworth  Sent: 11/2/2023  10:59 AM CDT  To: [facility] Staff  Subject: Reschedule Upcoming Surg                         Pt is requesting a  callback  regarding upcoming surg scheduled Tues 11/14. Pt would like to reschedule for Dec or before  New Year's. Please adv pt           Confirmed contact below:   Contact Name:Skip Normanzo  Phone Number: [phone]

## 2023-11-08 ENCOUNTER — OFFICE VISIT (OUTPATIENT)
Dept: OPHTHALMOLOGY | Facility: CLINIC | Age: 73
End: 2023-11-08
Payer: MEDICARE

## 2023-11-08 ENCOUNTER — TELEPHONE (OUTPATIENT)
Dept: OPHTHALMOLOGY | Facility: CLINIC | Age: 73
End: 2023-11-08
Payer: MEDICARE

## 2023-11-08 DIAGNOSIS — H25.11 NS (NUCLEAR SCLEROSIS), RIGHT: ICD-10-CM

## 2023-11-08 DIAGNOSIS — Z98.890 POST-OPERATIVE STATE: Primary | ICD-10-CM

## 2023-11-08 PROCEDURE — 99024 POSTOP FOLLOW-UP VISIT: CPT | Mod: POP,,, | Performed by: OPHTHALMOLOGY

## 2023-11-08 PROCEDURE — 99024 PR POST-OP FOLLOW-UP VISIT: ICD-10-PCS | Mod: POP,,, | Performed by: OPHTHALMOLOGY

## 2023-11-08 PROCEDURE — 99999 PR PBB SHADOW E&M-EST. PATIENT-LVL II: CPT | Mod: PBBFAC,,, | Performed by: OPHTHALMOLOGY

## 2023-11-08 PROCEDURE — 99999 PR PBB SHADOW E&M-EST. PATIENT-LVL II: ICD-10-PCS | Mod: PBBFAC,,, | Performed by: OPHTHALMOLOGY

## 2023-11-08 PROCEDURE — 92136 OPHTHALMIC BIOMETRY: CPT | Mod: 26,S$PBB,RT, | Performed by: OPHTHALMOLOGY

## 2023-11-08 PROCEDURE — 92136 PR OPHTHAL BIOMETRY,INTRAOC LENS POW CALC: ICD-10-PCS | Mod: 26,S$PBB,RT, | Performed by: OPHTHALMOLOGY

## 2023-11-08 PROCEDURE — 92136 OPHTHALMIC BIOMETRY: CPT | Mod: PBBFAC | Performed by: OPHTHALMOLOGY

## 2023-11-08 PROCEDURE — 99212 OFFICE O/P EST SF 10 MIN: CPT | Mod: PBBFAC | Performed by: OPHTHALMOLOGY

## 2023-11-08 NOTE — PROGRESS NOTES
Subjective:       Patient ID: Skip Curran is a 73 y.o. female.    Chief Complaint: Post-op Evaluation    HPI    Pt here for 1 week Post Op OS  Pt state blurry [place]   no complaints at this time   Denies f/f      Gtts:\  Pred TID OS  Keto TID OS  Last edited by Dimas Sheppard MD on 11/8/2023  5:54 PM.             Assessment:       1. Post-operative state    2. NS (nuclear sclerosis), right        Plan:       S/p CE OS for reading- Doing well.     Visually significant cataract OD -Pt. Wants Sx.         Taper gtts OS.  Cataract Surgery Consent: Patient with a visually significant cataract with difficulties of ADLs, reading, driving, night vision, glare (any and all).  Discussed with Patient/Family/Caregiver: options, risks and benefits, expectations of cataract surgery, utilized an eye model with questions and answers to facilitate discussion.  Discussed lens options and patient understands that glasses may be required for optimal vision for distance and/or near vision after cataract surgery.  The Patient/Family/Caregiver  voice good understanding and patient wishes to proceed with surgery.  The patient will likely benefit from surgery and patient signed consent for Right Eye.   CE OD 11/14/23.

## 2023-11-11 NOTE — H&P
[facility]  History & Physical    Subjective:      Chief Complaint/Reason for Admission:     Skip Curran is a 73 y.o. female.    Past Medical History:   Diagnosis Date    Allergy     Arthritis     Asthma     Back injuries     two broken bones that healed on own    Cataract     Depression     Diabetes mellitus     Diabetes mellitus type I     GERD (gastroesophageal reflux disease)     Hypercholesteremia     Hypertension     Insomnia     Slow to wake up after anesthesia 2002    Orthoscope of knee     Past Surgical History:   Procedure Laterality Date    anal fissure surgery      BELPHAROPTOSIS REPAIR Bilateral 11/11/2022    Procedure: REPAIR, BLEPHAROPTOSIS;  Surgeon: Francheska Arechiga MD;  Location: [address];  Service: Ophthalmology;  Laterality: Bilateral;    BREAST BIOPSY Right     benign    BRONCHOSCOPY N/A 10/22/2019    Procedure: bronch floro , noon;  Surgeon: Pepe Andersen MD;  Location: [facility];  Service: Endoscopy;  Laterality: N/A;    CATARACT EXTRACTION W/  INTRAOCULAR LENS IMPLANT Left 10/31/2023    Procedure: EXTRACTION, CATARACT, WITH IOL INSERTION;  Surgeon: Dimas Sheppard MD;  Location: [facility] OR;  Service: Ophthalmology;  Laterality: Left;    COLONOSCOPY  09/18/2020    COLONOSCOPY N/A 9/18/2020    Procedure: COLONOSCOPY;  Surgeon: Magno Gant MD;  Location: [facility];  Service: Colon and Rectal;  Laterality: N/A;    HYSTERECTOMY      KNEE ARTHROPLASTY Left 11/28/2022    Procedure: ARTHROPLASTY, KNEE TOTAL;  Surgeon: Jules Carolina MD;  Location: [facility] OR;  Service: Orthopedics;  Laterality: Left;    KNEE ARTHROSCOPY W/ DEBRIDEMENT Left     x3    LIPOMA RESECTION      fatty tumor between breast    SHOULDER SURGERY Left 2/11/2016    Dr Burris     TONSILLECTOMY      TOTAL KNEE ARTHROPLASTY Right 3/18/2021    Procedure: ARTHROPLASTY, KNEE, TOTAL;  Surgeon: Rashi De La O MD;  Location: [facility] OR;  Service: Orthopedics;  Laterality: Right;   ADDUCTOR BLOCK    WRIST RECONSTRUCTION Right     tendon       Family history non-contributory to current problem   Social History     Tobacco Use    Smoking status: Never    Smokeless tobacco: Never   Substance Use Topics    Alcohol use: Yes     Alcohol/week: 1.0 standard drink of alcohol     Types: 1 Shots of liquor per week     Comment: socially    Drug use: No       No medications prior to admission.     Review of patient's allergies indicates:   Allergen Reactions    Aspirin Hives    Aleve [naproxen sodium] Hives    Iodinated contrast media     Iodine Other (See Comments)     WHEN INJECTED- pt  states she coded    Pcn [penicillins] Rash        Review of Systems   Eyes:  Positive for blurred vision.   All other systems reviewed and are negative.      Objective:      Vital Signs (Most Recent)       Vital Signs Range (Last 24H):  BP: ()/()   Arterial Line BP: ()/()     Physical Exam  Constitutional:       Appearance: She is well-developed.   HENT:      Head: Normocephalic.   Eyes:      Conjunctiva/sclera: Conjunctivae normal.      Pupils: Pupils are equal, round, and reactive to light.   Cardiovascular:      Rate and Rhythm: Normal rate and regular rhythm.      Heart sounds: Normal heart sounds.   Pulmonary:      Effort: Pulmonary effort is normal.      Breath sounds: Normal breath sounds.   Abdominal:      General: Bowel sounds are normal.      Palpations: Abdomen is soft.   Musculoskeletal:         General: Normal range of motion.      Cervical back: Normal range of motion and neck supple.   Skin:     General: Skin is warm.   Neurological:      Mental Status: She is alert and oriented to person, place, and time.         Data Review:     ECG:     Assessment:      Cataract OD.    Plan:    CE OD.

## 2023-11-12 ENCOUNTER — PATIENT MESSAGE (OUTPATIENT)
Dept: DIABETES | Facility: CLINIC | Age: 73
End: 2023-11-12
Payer: MEDICARE

## 2023-11-13 ENCOUNTER — ANESTHESIA EVENT (OUTPATIENT)
Dept: SURGERY | Facility: HOSPITAL | Age: 73
End: 2023-11-13
Payer: MEDICARE

## 2023-11-13 NOTE — PRE-PROCEDURE INSTRUCTIONS
11/13 Unable to reach pt via phone. Voicemail is full. The following was sent to pt portal.     Dear Skip,     Below you will find basic pre-procedure instructions in preparation for your procedure on 11/14 with Dr. Sheppard  (Arrival time Verified 07:00 am)     - Nothing to eat or drink after midnight the night before your surgery, except AM meds with small sips of water     - HOLD all Diabetic meds AM of surgery  - HOLD all Insulin AM of surgery  - HOLD all Fluid pills AM of surgery  - HOLD all non-insulin shots until after surgery (Ozempic, Mounjaro, Trulicity, Victoza, Byetta, Wegovy and Adlyxin) (up to 7 days prior)  - HOLD all vitamins and herbal meds AM of surgery   - TAKE all B/P meds, EXCEPT those that contain a fluid pill  - USE inhalers as needed and bring AM of surgery  - USE eye drops as directed  -TAKE blood thinner meds AM of surgery unless otherwise instructed     - Shower and wash face with dial soap for 3 mins PM prior and AM of surgery  - No powder, lotions, creams, oils, gels, ointments, makeup,  or jewelry    - Wear comfortable clothing (button up shirt)     (Patient is required to have a responsible ride to transport home, ride may not leave while patient is in surgery)     -- [facility], 2nd floor Surgery Center, located   @ [address] [place] [address]  2nd Floor Registration        If you have any questions or concerns please feel free to contact your surgeon's office.                 [place], LPN [facility]  Pre-Admit - Anesthesia Dept
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
## 2023-11-14 ENCOUNTER — HOSPITAL ENCOUNTER (OUTPATIENT)
Facility: HOSPITAL | Age: 73
Discharge: HOME | End: 2023-11-14
Attending: OPHTHALMOLOGY | Admitting: OPHTHALMOLOGY
Payer: MEDICARE

## 2023-11-14 ENCOUNTER — ANESTHESIA (OUTPATIENT)
Dept: SURGERY | Facility: HOSPITAL | Age: 73
End: 2023-11-14
Payer: MEDICARE

## 2023-11-14 VITALS
TEMPERATURE: 98 F | SYSTOLIC BLOOD PRESSURE: 151 MMHG | HEIGHT: 60 IN | RESPIRATION RATE: 20 BRPM | BODY MASS INDEX: 33.77 KG/M2 | OXYGEN SATURATION: 93 % | WEIGHT: 172 LBS | HEART RATE: 66 BPM | DIASTOLIC BLOOD PRESSURE: 74 MMHG

## 2023-11-14 DIAGNOSIS — H25.11 NUCLEAR SCLEROTIC CATARACT OF RIGHT EYE: Primary | ICD-10-CM

## 2023-11-14 LAB — POCT GLUCOSE: 121 MG/DL (ref 70–110)

## 2023-11-14 PROCEDURE — D9220A PRA ANESTHESIA: Mod: ,,, | Performed by: NURSE ANESTHETIST, CERTIFIED REGISTERED

## 2023-11-14 PROCEDURE — 37000009 HC ANESTHESIA EA ADD 15 MINS: Performed by: OPHTHALMOLOGY

## 2023-11-14 PROCEDURE — 63600175 PHARM REV CODE 636 W HCPCS: Performed by: OPHTHALMOLOGY

## 2023-11-14 PROCEDURE — 66984 XCAPSL CTRC RMVL W/O ECP: CPT | Mod: 79,RT,, | Performed by: OPHTHALMOLOGY

## 2023-11-14 PROCEDURE — 99900035 HC TECH TIME PER 15 MIN (STAT)

## 2023-11-14 PROCEDURE — 25000003 PHARM REV CODE 250: Performed by: OPHTHALMOLOGY

## 2023-11-14 PROCEDURE — 94761 N-INVAS EAR/PLS OXIMETRY MLT: CPT

## 2023-11-14 PROCEDURE — 27201423 OPTIME MED/SURG SUP & DEVICES STERILE SUPPLY: Performed by: OPHTHALMOLOGY

## 2023-11-14 PROCEDURE — 66984 PR REMOVAL, CATARACT, W/INSRT INTRAOC LENS, W/O ENDO CYCLO: ICD-10-PCS | Mod: 79,RT,, | Performed by: OPHTHALMOLOGY

## 2023-11-14 PROCEDURE — 36000706: Performed by: OPHTHALMOLOGY

## 2023-11-14 PROCEDURE — 63600175 PHARM REV CODE 636 W HCPCS: Performed by: NURSE ANESTHETIST, CERTIFIED REGISTERED

## 2023-11-14 PROCEDURE — 37000008 HC ANESTHESIA 1ST 15 MINUTES: Performed by: OPHTHALMOLOGY

## 2023-11-14 PROCEDURE — 71000015 HC POSTOP RECOV 1ST HR: Performed by: OPHTHALMOLOGY

## 2023-11-14 PROCEDURE — V2632 POST CHMBR INTRAOCULAR LENS: HCPCS | Performed by: OPHTHALMOLOGY

## 2023-11-14 PROCEDURE — D9220A PRA ANESTHESIA: ICD-10-PCS | Mod: ,,, | Performed by: NURSE ANESTHETIST, CERTIFIED REGISTERED

## 2023-11-14 PROCEDURE — 36000707: Performed by: OPHTHALMOLOGY

## 2023-11-14 DEVICE — LENS CLAREON AUTONOME 24.5D: Type: IMPLANTABLE DEVICE | Site: EYE | Status: FUNCTIONAL

## 2023-11-14 RX ORDER — HYDROCODONE BITARTRATE AND ACETAMINOPHEN 5; 325 MG/1; MG/1
1 TABLET ORAL EVERY 4 HOURS PRN
Status: DISCONTINUED | OUTPATIENT
Start: 2023-11-14 | End: 2023-11-14 | Stop reason: HOSPADM

## 2023-11-14 RX ORDER — MOXIFLOXACIN 5 MG/ML
1 SOLUTION/ DROPS OPHTHALMIC
Status: COMPLETED | OUTPATIENT
Start: 2023-11-14 | End: 2023-11-14

## 2023-11-14 RX ORDER — EPINEPHRINE 1 MG/ML
INJECTION, SOLUTION, CONCENTRATE INTRAVENOUS
Status: DISCONTINUED | OUTPATIENT
Start: 2023-11-14 | End: 2023-11-14 | Stop reason: HOSPADM

## 2023-11-14 RX ORDER — MIDAZOLAM HYDROCHLORIDE 1 MG/ML
INJECTION, SOLUTION INTRAMUSCULAR; INTRAVENOUS
Status: DISCONTINUED | OUTPATIENT
Start: 2023-11-14 | End: 2023-11-14

## 2023-11-14 RX ORDER — LIDOCAINE HYDROCHLORIDE 40 MG/ML
INJECTION, SOLUTION RETROBULBAR
Status: DISCONTINUED | OUTPATIENT
Start: 2023-11-14 | End: 2023-11-14 | Stop reason: HOSPADM

## 2023-11-14 RX ORDER — TETRACAINE HYDROCHLORIDE 5 MG/ML
SOLUTION OPHTHALMIC
Status: DISCONTINUED | OUTPATIENT
Start: 2023-11-14 | End: 2023-11-14 | Stop reason: HOSPADM

## 2023-11-14 RX ORDER — SODIUM CHLORIDE 9 MG/ML
INJECTION, SOLUTION INTRAVENOUS CONTINUOUS
Status: DISCONTINUED | OUTPATIENT
Start: 2023-11-14 | End: 2023-11-14 | Stop reason: HOSPADM

## 2023-11-14 RX ORDER — CYCLOP/TROP/PROPA/PHEN/KET/WAT 1-1-0.1%
1 DROPS (EA) OPHTHALMIC (EYE)
Status: COMPLETED | OUTPATIENT
Start: 2023-11-14 | End: 2023-11-14

## 2023-11-14 RX ORDER — ACETAMINOPHEN 325 MG/1
650 TABLET ORAL EVERY 4 HOURS PRN
Status: DISCONTINUED | OUTPATIENT
Start: 2023-11-14 | End: 2023-11-14 | Stop reason: HOSPADM

## 2023-11-14 RX ADMIN — MIDAZOLAM HYDROCHLORIDE 0.5 MG: 1 INJECTION, SOLUTION INTRAMUSCULAR; INTRAVENOUS at 08:11

## 2023-11-14 RX ADMIN — Medication 1 DROP: at 07:11

## 2023-11-14 RX ADMIN — MOXIFLOXACIN OPHTHALMIC 1 DROP: 5 SOLUTION/ DROPS OPHTHALMIC at 07:11

## 2023-11-14 NOTE — DISCHARGE INSTRUCTIONS
Cataract Post Surgery Instructions     START YOUR DROPS AS SOON AS YOU GET HOME FROM SURGERY      Instill the following drops 1 drop, three (3) times daily, every four (4) hours.      FIVE MINUTES APART FROM EACH OTHER      OFLOXACIN  ( [place])    KETOROLAC  ( [place])    PREDNISOLONE ACETATE  ( [place] or [place])         RESTRICTIONS FOR SEVEN (7) DAYS FOLLOWING SURGERY     DO NOT lift anything over 10 pounds.     DO NOT bend at the waist, only at the knees.      DO NOT rub your eye.      DO NOT get any water into the eye.      DO NOT wear any makeup, lotions, or creams on/around the eye.     Wear the eye shield you were given after surgery anytime you go to sleep.  You may remove the shield while awake.      ACTIVITY LEVEL:   If you receive sedation or an anesthetic, you may feel drowsy for several hours.  Sleep until fully rested.  Gradually resume your normal activities - BE MINDFUL OF RESTRICTIONS.  It is not uncommon for the white part of the eye to be red or to have some degree of blurry vision after cataract surgery.     BATHING: You may shower or bathe as normal - BE MINDFUL OF RESTRICTIONS.  You may take a warm washcloth, rung out to remove excess water, and gently remove crusting on lashes.        **You may take Tylenol or Ibuprofen for pain.  If you have severe pain, redness, or decreased vision, call Dr Sheppard or the after-hours on-call doctor immediately at:  Dr Sheppard # [phone] / After hours # [phone]. Discharge instructions given and explained to patient and family with verbalization of understanding all instructions. Patient is AAOx3,v/s stable, denies n/v and tolerating po, rates pain level tolerable, IV removed, and family at bedside. Patient discharged to home. Patient transported off unit via wheelchair to private vehicle with family.

## 2023-11-14 NOTE — PLAN OF CARE
Discharge instructions given and explained to patient and family with verbalization of understanding all instructions. Patient is AAOx3,v/s stable, denies n/v and tolerating po, rates pain level tolerable, IV removed, and family at bedside. Patient discharged to home. Patient transported off unit via wheelchair to private vehicle with family.

## 2023-11-14 NOTE — OP NOTE
Operative Date:  11/14/2023    Discharge Date:  11/14/2023    Discharge Patient Home    Report Title: Operative Note      SURGEON: Dimas Sheppard MD     ASSISTANT:     PREOPERATIVE DIAGNOSIS: Visually significant NSC cataract,  Right Eye.    POSTOPERATIVE DIAGNOSIS: Visually-significant NSC cataract,  Right Eye.    PROCEDURE PERFORMED: Phacoemulsification of the cataract with posterior chamber intraocular lens Right Eye.    ANESTHESIA: Topical with MAC     COMPLICATIONS: None    ESTIMATED BLOOD LOSS: Minimal    INDICATIONS FOR PROCEDURE:   The patient is a pleasant 73 year old woman with increasing difficulties with activities of daily living secondary to a dense visually significant cataract in the Right Eye.  Discussions have been carried out with this patient concerning the options to surgery, risks, benefits and expectations.  The patient voiced good understanding and wished to proceed with the above procedure.    PROCEDURE IN DETAIL: The patient was brought to the operating room and received topical anesthetic to the eye and then was prepped and draped in the usual sterile fashion.  Using the Jerry ring and the guarded karen blade set at 0.37 mm, a partial thickness clear cornea incision was made temporally.  The paracentesis site was made at the twelve o'clock position.  Omidria was injected into the anterior chamber through the paracentesis.  Viscoat was then injected into the anterior chamber.  The eye was then reentered at the primary surgical site with a 2.4 mm keratome followed by continuous capsulotomy, hydrodissection, hydrodelineation and phacoemulsification of the cataract.  Residual cortical material was removed using automated irrigation-aspiration technique.  Healon was injected into the posterior chamber and a CNAOTO 24.5 diopter lens was placed in the bag without difficulty. Residual viscoelastic was removed using automated irrigation-aspiration technique. The eye was re-pressurized  using BSS solution and both the paracentesis site and the primary surgical site were demonstrated to be watertight at the end of the case with Weck--Janell manipulation.  One drop of Ofloxacin and one drop of Pred acetate 1% was applied to the Right Eye .The eye was closed, patched and a [place] shield placed.  The patient was taken to the recovery room in good and stable condition.  The patient tolerated the procedure well.  The patient was instructed to refrain from any heavy lifting, bending, stooping or straining activities, discharged home  and to follow-up in the morning for routine postoperative care with Dimas Sheppard MD.

## 2023-11-14 NOTE — ANESTHESIA POSTPROCEDURE EVALUATION
Anesthesia Post Evaluation    Patient: Skip Curran    Procedure(s) Performed: Procedure(s) (LRB):  EXTRACTION, CATARACT, WITH IOL INSERTION (Right)    Final Anesthesia Type: general      Patient location during evaluation: GI PACU  Patient participation: Yes- Able to Participate  Level of consciousness: awake and alert, oriented and awake  Post-procedure vital signs: reviewed and stable  Pain management: adequate  Airway patency: patent  ROSA mitigation strategies: Multimodal analgesia  PONV status at discharge: No PONV  Anesthetic complications: no      Cardiovascular status: blood pressure returned to baseline and hemodynamically stable  Respiratory status: unassisted and spontaneous ventilation  Hydration status: euvolemic  Follow-up not needed.          Vitals Value Taken Time   /74 11/14/23 0852   Temp 36.6 °C (97.9 °F) 11/14/23 0836   Pulse 70 11/14/23 0855   Resp 20 11/14/23 0850   SpO2 96 % 11/14/23 0855   Vitals shown include unvalidated device data.      No case tracking events are documented in the log.      Pain/Ewa Score: Ewa Score: 10 (11/14/2023  8:45 AM)

## 2023-11-14 NOTE — BRIEF OP NOTE
[facility]  Brief Operative Note    Surgery Date: 11/14/2023     Surgeon(s) and Role:     * Dimas Sheppard MD - Primary    Assisting Surgeon: None    Pre-op Diagnosis:  NS (nuclear sclerosis), right [H25.11]    Post-op Diagnosis:  Post-Op Diagnosis Codes:     * NS (nuclear sclerosis), right [H25.11]    Procedure(s) (LRB):  EXTRACTION, CATARACT, WITH IOL INSERTION (Right)    Anesthesia: Local MAC    Operative Findings:     Estimated Blood Loss: * No values recorded between 11/14/2023  8:13 AM and 11/14/2023  8:35 AM *         Specimens:   Specimen (24h ago, onward)      None              Discharge Note    OUTCOME: Patient tolerated treatment/procedure well without complication and is now ready for discharge.    DISPOSITION: Home or Self Care    FINAL DIAGNOSIS:  Nuclear sclerotic cataract of right eye    FOLLOWUP: In clinic    DISCHARGE INSTRUCTIONS:    Discharge Procedure Orders   Other restrictions (specify):   Order Comments: No heavy lifting or bending for 1 week.

## 2023-11-14 NOTE — TRANSFER OF CARE
Anesthesia Transfer of Care Note    Patient: Skip Curran    Procedure(s) Performed: Procedure(s) (LRB):  EXTRACTION, CATARACT, WITH IOL INSERTION (Right)    Patient location: PACU    Anesthesia Type: MAC    Transport from OR: Transported from OR on room air with adequate spontaneous ventilation    Post pain: adequate analgesia    Post assessment: no apparent anesthetic complications and tolerated procedure well    Post vital signs: stable    Level of consciousness: awake, alert and oriented    Nausea/Vomiting: no nausea/vomiting    Complications: none    Transfer of care protocol was followed    Last vitals: Visit Vitals  BP (!) 164/79 (BP Location: Right arm, Patient Position: Lying)   Pulse 73   Temp 36.7 °C (98 °F) (Tympanic)   Resp 18   Ht 5' (1.524 m)   Wt 78 kg (172 lb)   SpO2 98%   Breastfeeding No   BMI 33.59 kg/m²

## 2023-11-15 ENCOUNTER — OFFICE VISIT (OUTPATIENT)
Dept: OPHTHALMOLOGY | Facility: CLINIC | Age: 73
End: 2023-11-15
Payer: MEDICARE

## 2023-11-15 DIAGNOSIS — Z98.890 POST-OPERATIVE STATE: Primary | ICD-10-CM

## 2023-11-15 PROCEDURE — 99999 PR PBB SHADOW E&M-EST. PATIENT-LVL III: ICD-10-PCS | Mod: PBBFAC,,, | Performed by: OPHTHALMOLOGY

## 2023-11-15 PROCEDURE — 99213 OFFICE O/P EST LOW 20 MIN: CPT | Mod: PBBFAC | Performed by: OPHTHALMOLOGY

## 2023-11-15 PROCEDURE — 99024 POSTOP FOLLOW-UP VISIT: CPT | Mod: POP,,, | Performed by: OPHTHALMOLOGY

## 2023-11-15 PROCEDURE — 99999 PR PBB SHADOW E&M-EST. PATIENT-LVL III: CPT | Mod: PBBFAC,,, | Performed by: OPHTHALMOLOGY

## 2023-11-15 PROCEDURE — 99024 PR POST-OP FOLLOW-UP VISIT: ICD-10-PCS | Mod: POP,,, | Performed by: OPHTHALMOLOGY

## 2023-11-15 RX ORDER — INSULIN LISPRO 100 [IU]/ML
100 INJECTION, SOLUTION INTRAVENOUS; SUBCUTANEOUS
COMMUNITY
End: 2024-01-30

## 2023-11-15 RX ORDER — INSULIN GLARGINE 100 [IU]/ML
100 INJECTION, SOLUTION SUBCUTANEOUS DAILY
COMMUNITY
End: 2024-01-30

## 2023-11-15 RX ORDER — AMLODIPINE BESYLATE 10 MG/1
10 TABLET ORAL DAILY
COMMUNITY
End: 2023-12-11 | Stop reason: SDUPTHER

## 2023-11-15 RX ORDER — SIMVASTATIN 40 MG/1
40 TABLET, FILM COATED ORAL NIGHTLY
COMMUNITY
End: 2023-12-11

## 2023-11-15 NOTE — PROGRESS NOTES
Subjective:       Patient ID: Skip Curran is a 73 y.o. female.    Chief Complaint: No chief complaint on file.             Assessment:       1. Post-operative state        Plan:       S/p CE OU- Doing well.      CPM OD.  Taper gtts OS.  RTC 1 wk.

## 2023-11-22 ENCOUNTER — OFFICE VISIT (OUTPATIENT)
Dept: OPHTHALMOLOGY | Facility: CLINIC | Age: 73
End: 2023-11-22
Payer: MEDICARE

## 2023-11-22 DIAGNOSIS — Z98.890 POST-OPERATIVE STATE: Primary | ICD-10-CM

## 2023-11-22 PROCEDURE — 99999 PR PBB SHADOW E&M-EST. PATIENT-LVL III: ICD-10-PCS | Mod: PBBFAC,,, | Performed by: OPHTHALMOLOGY

## 2023-11-22 PROCEDURE — 99999 PR PBB SHADOW E&M-EST. PATIENT-LVL III: CPT | Mod: PBBFAC,,, | Performed by: OPHTHALMOLOGY

## 2023-11-22 PROCEDURE — 99024 POSTOP FOLLOW-UP VISIT: CPT | Mod: POP,,, | Performed by: OPHTHALMOLOGY

## 2023-11-22 PROCEDURE — 99024 PR POST-OP FOLLOW-UP VISIT: ICD-10-PCS | Mod: POP,,, | Performed by: OPHTHALMOLOGY

## 2023-11-22 PROCEDURE — 99213 OFFICE O/P EST LOW 20 MIN: CPT | Mod: PBBFAC | Performed by: OPHTHALMOLOGY

## 2023-11-22 NOTE — PROGRESS NOTES
Subjective:       Patient ID: Skip Curran is a 73 y.o. female.    Chief Complaint: No chief complaint on file.    HPI    Skip Curran is a 73 y.o. female is here for a 1 wk POST OP OD. Still   using 3 gtts TID OS.  Last edited by Yoni Ma on 11/22/2023 10:12 AM.             Assessment:       1. Post-operative state        Plan:       S/p CE OU- Doing well.      Taper gtts OU.  RTC 3 wks.

## 2023-12-11 PROBLEM — Z98.890 S/P COLONOSCOPY: Status: ACTIVE | Noted: 2023-12-11

## 2023-12-18 DIAGNOSIS — Z79.4 TYPE 2 DIABETES MELLITUS WITH HYPERGLYCEMIA, WITH LONG-TERM CURRENT USE OF INSULIN: ICD-10-CM

## 2023-12-18 DIAGNOSIS — E11.65 TYPE 2 DIABETES MELLITUS WITH HYPERGLYCEMIA, WITH LONG-TERM CURRENT USE OF INSULIN: ICD-10-CM

## 2023-12-18 RX ORDER — INSULIN ASPART INJECTION 100 [IU]/ML
INJECTION, SOLUTION SUBCUTANEOUS
Qty: 30 ML | Refills: 6 | Status: SHIPPED | OUTPATIENT
Start: 2023-12-18 | End: 2024-01-30 | Stop reason: SDUPTHER

## 2023-12-27 ENCOUNTER — OFFICE VISIT (OUTPATIENT)
Dept: OPHTHALMOLOGY | Facility: CLINIC | Age: 73
End: 2023-12-27
Payer: MEDICARE

## 2023-12-27 DIAGNOSIS — Z98.890 POST-OPERATIVE STATE: Primary | ICD-10-CM

## 2023-12-27 DIAGNOSIS — H52.7 REFRACTIVE ERROR: ICD-10-CM

## 2023-12-27 PROCEDURE — 99999 PR PBB SHADOW E&M-EST. PATIENT-LVL III: CPT | Mod: PBBFAC,,, | Performed by: OPHTHALMOLOGY

## 2023-12-27 PROCEDURE — 99213 OFFICE O/P EST LOW 20 MIN: CPT | Mod: PBBFAC | Performed by: OPHTHALMOLOGY

## 2023-12-27 PROCEDURE — 99999 PR PBB SHADOW E&M-EST. PATIENT-LVL III: ICD-10-PCS | Mod: PBBFAC,,, | Performed by: OPHTHALMOLOGY

## 2023-12-27 PROCEDURE — 99024 POSTOP FOLLOW-UP VISIT: CPT | Mod: POP,,, | Performed by: OPHTHALMOLOGY

## 2023-12-27 PROCEDURE — 99024 PR POST-OP FOLLOW-UP VISIT: ICD-10-PCS | Mod: POP,,, | Performed by: OPHTHALMOLOGY

## 2023-12-27 NOTE — PROGRESS NOTES
Subjective:       Patient ID: Skip Curran is a 73 y.o. female.    Chief Complaint: Post-op Evaluation    HPI    DLS: 11/22/23    S/p phaco w/IOL OS 10/31/23   S/p phaco w/IOL OD 11/14/23    No eyedrops    Pt here for 3 week post op OU.  Pt states doing well. Pt denies eye pain   OU.  Last edited by Rosie [place] MA on 12/27/2023  1:12 PM.             Assessment:       1. Post-operative state    2. Refractive error        Plan:       S/p CE OU- Doing well.  RE-Doing well with monovision.      RTC Dr Pérez in 6 mos.

## 2024-01-30 ENCOUNTER — OFFICE VISIT (OUTPATIENT)
Dept: DIABETES | Facility: CLINIC | Age: 74
End: 2024-01-30
Payer: MEDICARE

## 2024-01-30 VITALS
BODY MASS INDEX: 35.14 KG/M2 | SYSTOLIC BLOOD PRESSURE: 121 MMHG | OXYGEN SATURATION: 96 % | WEIGHT: 179 LBS | DIASTOLIC BLOOD PRESSURE: 70 MMHG | HEART RATE: 80 BPM | HEIGHT: 60 IN

## 2024-01-30 DIAGNOSIS — E78.5 DYSLIPIDEMIA, GOAL LDL BELOW 100: ICD-10-CM

## 2024-01-30 DIAGNOSIS — E11.65 TYPE 2 DIABETES MELLITUS WITH HYPERGLYCEMIA, WITH LONG-TERM CURRENT USE OF INSULIN: Primary | ICD-10-CM

## 2024-01-30 DIAGNOSIS — Z79.4 TYPE 2 DIABETES MELLITUS WITH HYPERGLYCEMIA, WITH LONG-TERM CURRENT USE OF INSULIN: Primary | ICD-10-CM

## 2024-01-30 DIAGNOSIS — I10 PRIMARY HYPERTENSION: ICD-10-CM

## 2024-01-30 DIAGNOSIS — Z71.9 HEALTH EDUCATION/COUNSELING: ICD-10-CM

## 2024-01-30 DIAGNOSIS — E66.09 CLASS 1 OBESITY DUE TO EXCESS CALORIES WITH SERIOUS COMORBIDITY AND BODY MASS INDEX (BMI) OF 34.0 TO 34.9 IN ADULT: ICD-10-CM

## 2024-01-30 PROCEDURE — 99215 OFFICE O/P EST HI 40 MIN: CPT | Mod: PBBFAC,PN | Performed by: NURSE PRACTITIONER

## 2024-01-30 PROCEDURE — 99999 PR PBB SHADOW E&M-EST. PATIENT-LVL V: CPT | Mod: PBBFAC,,, | Performed by: NURSE PRACTITIONER

## 2024-01-30 PROCEDURE — 95251 CONT GLUC MNTR ANALYSIS I&R: CPT | Mod: ,,, | Performed by: NURSE PRACTITIONER

## 2024-01-30 PROCEDURE — 99214 OFFICE O/P EST MOD 30 MIN: CPT | Mod: S$PBB,,, | Performed by: NURSE PRACTITIONER

## 2024-01-30 RX ORDER — BLOOD-GLUCOSE TRANSMITTER
1 EACH MISCELLANEOUS
Qty: 1 EACH | Refills: 4 | Status: SHIPPED | OUTPATIENT
Start: 2024-01-30 | End: 2024-01-31 | Stop reason: SDUPTHER

## 2024-01-30 RX ORDER — INSULIN ASPART INJECTION 100 [IU]/ML
INJECTION, SOLUTION SUBCUTANEOUS
Qty: 30 ML | Refills: 6 | Status: SHIPPED | OUTPATIENT
Start: 2024-01-30 | End: 2024-03-18 | Stop reason: SDUPTHER

## 2024-01-30 RX ORDER — TIRZEPATIDE 10 MG/.5ML
10 INJECTION, SOLUTION SUBCUTANEOUS
Qty: 4 PEN | Refills: 6 | Status: SHIPPED | OUTPATIENT
Start: 2024-01-30 | End: 2024-03-14 | Stop reason: DRUGHIGH

## 2024-01-30 RX ORDER — BLOOD-GLUCOSE SENSOR
1 EACH MISCELLANEOUS
Qty: 3 EACH | Refills: 11 | Status: SHIPPED | OUTPATIENT
Start: 2024-01-30 | End: 2024-01-31 | Stop reason: SDUPTHER

## 2024-01-30 RX ORDER — ROSUVASTATIN CALCIUM 20 MG/1
20 TABLET, COATED ORAL DAILY
Qty: 90 TABLET | Refills: 2 | Status: SHIPPED | OUTPATIENT
Start: 2024-01-30 | End: 2024-06-10 | Stop reason: SDUPTHER

## 2024-01-30 RX ORDER — METFORMIN HYDROCHLORIDE 500 MG/1
500 TABLET, EXTENDED RELEASE ORAL 2 TIMES DAILY WITH MEALS
Qty: 180 TABLET | Refills: 2 | Status: SHIPPED | OUTPATIENT
Start: 2024-01-30 | End: 2024-06-10 | Stop reason: SDUPTHER

## 2024-01-30 RX ORDER — ROSUVASTATIN CALCIUM 20 MG/1
20 TABLET, COATED ORAL DAILY
COMMUNITY
End: 2024-01-30 | Stop reason: SDUPTHER

## 2024-01-30 RX ORDER — SUB-Q INSULIN DEVICE, 40 UNIT
EACH MISCELLANEOUS
Qty: 30 EACH | Refills: 6 | Status: SHIPPED | OUTPATIENT
Start: 2024-01-30 | End: 2024-03-18

## 2024-01-30 NOTE — ASSESSMENT & PLAN NOTE
LDL goal < 100.   LDL and total cholesterol above goal  She has been off her Crestor  Instructed her to get back on Crestor and will repeat lipids and CMP in 4 month  Previously well controlled on current dose.

## 2024-01-30 NOTE — ASSESSMENT & PLAN NOTE
Controlled   A1c down to 6%   She is frustrated with weight gain   She wants to increase the Mounjaro  We discussed that she is on the lowest V Go possible--with increasing the Mounjaro to the next dose we may need to consider cutting back her insulin  She will need to let me know if hypoglycemia occurs      -- Medication Changes:   Continue  Metformin  mg 2 times per day       Continue the VGo20 with Fiasp   Cut back on clicks with increasing mounjaro    2-3 clicks with breakfast    3 clicks with lunch  2-3 clicks with dinner     Give clicks before the meals not after      Always wear VGo      Change the VGo every 24 hours -- same times every day      If you are having lows you need to let me know      Think about omnipod 5      Increase Mounjaro to 10 mg weekly   With increasing the doses --  you may need to cut back on clicks with meals on th VGo   Let me know if lows happen -- we may need to consider coming off VGo or cutting back on Metformin          -- Reviewed goals of therapy are to get the best control we can without hypoglycemia.  -- Reviewed patient's current insulin regimen. Clarified proper insulin dose and timing in relation to meals, etc. Insulin injection sites and proper rotation instructed.    -- Advised frequent self blood glucose monitoring.  Patient encouraged to document glucose results and bring them to every clinic visit. Continue to use Dexcom G6   -- Hypoglycemia precautions discussed. Instructed on precautions before driving.    -- Call for Bg repeatedly < 70 or > 180.   -- Close adherence to lifestyle changes recommended.   -- Periodic follow ups for eye evaluations, foot care and dental care suggested.        Patient has diabetes mellitus and manages diabetes with intensive insulin regimen and uses prandial and basal insulin daily-- Via VGO   Patient requires a therapeutic CGM and is willing to use therapeutic CGM for the necessary frequent adjustments of insulin therapy.  I have  completed an in-person visit during the previous 6 months and will continue to have in-person visits every 6 months to assess adherence to their CGM regimen and diabetes treatment plan.  Due to COVID pandemic and need for remote monitoring this tool is medically necessary

## 2024-01-30 NOTE — PATIENT INSTRUCTIONS
-- Medication Changes:   Continue  Metformin  mg 2 times per day       Continue the VGo20 with Fiasp   Cut back on clicks with increasing mounjaro    2-3 clicks with breakfast    3 clicks with lunch  2-3 clicks with dinner     Give clicks before the meals not after      Always wear VGo      Change the VGo every 24 hours -- same times every day      If you are having lows you need to let me know      Think about omnipod 5      Increase Mounjaro to 10 mg weekly   With increasing the doses --  you may need to cut back on clicks with meals on th VGo   Let me know if lows happen -- we may need to consider coming off VGo or cutting back on Metformin

## 2024-01-30 NOTE — PROGRESS NOTES
CC:   Chief Complaint   Patient presents with    Diabetes Mellitus       HPI: Skip Curran is a 73 y.o. female presents for a follow up visit today for the management of T2DM.     She was diagnosed with Type 2 diabetes around 8796-5404 on routine lab work. She was initially started on Metformin and then later started insulin therapy around 2010.     Family hx of diabetes: father, and maternal grandmother   Hospitalized for diabetes: denies     No personal or personal of pancreatic cancer or pancreatitis.   + sister with thyroid cancer - unknown the kind of thyroid cancer     Our last visit was in September 2023  At that visit we changed her Ozempic to Mounjaro   Talked about the omnipod 5   Weight up from 176# to 179#   Denies GI upset with the mounjaro   Recent A1c down to 6%   Cholesterol is high - but she has been off her Crestor - reports her PCP told her to hold it while taking tramadol                   DIABETES COMPLICATIONS: none      Diabetes Management Status    ASA:  No-- allergy to ASA- Hives     Statin: Taking- Crestor 20 mg nightly   ACE/ARB: Taking- Lisinopril 10 mg daily     Screening or Prevention Patient's value Goal Complete/Controlled?   HgA1C Testing and Control   Lab Results   Component Value Date    HGBA1C 6.0 (H) 01/18/2024      Annually/Less than 8% Yes   Lipid profile : 01/18/2024 Annually Yes   LDL control Lab Results   Component Value Date    LDLCALC 144.2 01/18/2024    Annually/Less than 100 mg/dl  Yes   Nephropathy screening Lab Results   Component Value Date    LABMICR 10.0 01/18/2024     Lab Results   Component Value Date    PROTEINUA Negative 11/21/2022    Annually No   Blood pressure BP Readings from Last 1 Encounters:   01/30/24 121/70    Less than 140/90 Yes   Dilated retinal exam : 09/18/2023- [place] - Kamari Sal- [facility]- number [phone] Annually No   Foot exam   : 09/22/2023 Annually No       CURRENT A1C:    Hemoglobin  A1C   Date Value Ref Range Status   01/18/2024 6.0 (H) 4.0 - 5.6 % Final     Comment:     ADA Screening Guidelines:  5.7-6.4%  Consistent with prediabetes  >or=6.5%  Consistent with diabetes    High levels of fetal hemoglobin interfere with the HbA1C  assay. Heterozygous hemoglobin variants (HbS, HgC, etc)do  not significantly interfere with this assay.   However, presence of multiple variants may affect accuracy.     05/23/2023 6.7 (H) 4.0 - 5.6 % Final     Comment:     ADA Screening Guidelines:  5.7-6.4%  Consistent with prediabetes  >or=6.5%  Consistent with diabetes    High levels of fetal hemoglobin interfere with the HbA1C  assay. Heterozygous hemoglobin variants (HbS, HgC, etc)do  not significantly interfere with this assay.   However, presence of multiple variants may affect accuracy.     11/03/2022 6.8 (H) 4.0 - 5.6 % Final     Comment:     ADA Screening Guidelines:  5.7-6.4%  Consistent with prediabetes  >or=6.5%  Consistent with diabetes    High levels of fetal hemoglobin interfere with the HbA1C  assay. Heterozygous hemoglobin variants (HbS, HgC, etc)do  not significantly interfere with this assay.   However, presence of multiple variants may affect accuracy.         GOAL A1C: 6.5%-7% without hypoglycemia     DM MEDICATIONS USED IN THE PAST:  Metformin   Lantus, Janumet  Novolog 70/30  VGo  Dexcom   Metformin XR  Novolog   Humalog   Ozempic   Fiasp   Mounjaro       CURRENT DIABETES MEDICATIONS: Metformin  mg BID and  VGo20 with Fiasp 3 clicks with breakfast 4 clicks with lunch, 3 clicks with dinner  2-3 clicks if BG is high   Giving clicks after meals mostly -- sometimes before   Mounjaro 7.5 mg weekly on Thursdays   Insulin:  VGo  Missed doses: denies   Wearing VGO to abdomen.   Changing VGo every 24 hours.  No skin irritation     VGo from [facility].         BLOOD GLUCOSE MONITORING:   Sensor type: Dexcom G6   Site change:   q10 days  Average BG is 126  Estimated A1c is 6.3%   95% in range    4% high   <1% low   0% very low     2 weeks prior - BG was 132   95%in range   6.5% estimated A1c     Dexcom supplies from pharmacy now         HYPOGLYCEMIA: rarely    Glucagon kit: denies - lives alone   Medic alert bracelet: denies   Drinks OJ       MEALS: eating 3 meals per day   BF: toast or bagel and coffee- with sweet and low and SF creamer   Lunch: sister in law cooks every day   Dinner: home cooked as well.   Snack: occ on peanut, occ candy   Drinks: water or diet drinks        CURRENT EXERCISE:  No    Review of Systems  Review of Systems   Constitutional:  Negative for appetite change and fatigue.   HENT:  Negative for trouble swallowing.    Eyes:  Negative for visual disturbance.   Respiratory:  Negative for shortness of breath.    Cardiovascular:  Negative for chest pain.   Gastrointestinal:  Negative for nausea.   Endocrine: Negative for polydipsia, polyphagia and polyuria.   Genitourinary:         No Nocturia    Musculoskeletal:  Positive for arthralgias (right hip), back pain and joint swelling.        Right knee has improved with CBD capsule    Skin:  Negative for wound.   Neurological:  Negative for numbness.       Physical Exam   Physical Exam  Vitals and nursing note reviewed.   Constitutional:       Appearance: She is well-developed. She is obese.   HENT:      Head: Normocephalic and atraumatic.      Right Ear: External ear normal.      Left Ear: External ear normal.      Nose: Nose normal.   Neck:      Thyroid: No thyromegaly.      Trachea: No tracheal deviation.   Cardiovascular:      Rate and Rhythm: Normal rate and regular rhythm.      Heart sounds: No murmur heard.  Pulmonary:      Effort: Pulmonary effort is normal. No respiratory distress.      Breath sounds: Normal breath sounds.   Abdominal:      Palpations: Abdomen is soft.      Tenderness: There is no abdominal tenderness.      Hernia: No hernia is present.   Musculoskeletal:      Cervical back: Normal range of motion and neck supple.    Skin:     General: Skin is warm and dry.      Capillary Refill: Capillary refill takes less than 2 seconds.      Findings: No rash.      Comments: VGo and Dexcom sites are normal appearing. No lipo hypertropthy or atrophy     Neurological:      Mental Status: She is alert and oriented to person, place, and time.      Cranial Nerves: No cranial nerve deficit.   Psychiatric:         Behavior: Behavior normal.         Judgment: Judgment normal.         FOOT EXAMINATION: Appropriate footwear         Lab Results   Component Value Date    TSH 1.809 08/03/2023         Type 2 diabetes mellitus with hyperglycemia, with long-term current use of insulin  Controlled   A1c down to 6%   She is frustrated with weight gain   She wants to increase the Mounjaro  We discussed that she is on the lowest V Go possible--with increasing the Mounjaro to the next dose we may need to consider cutting back her insulin  She will need to let me know if hypoglycemia occurs      -- Medication Changes:   Continue  Metformin  mg 2 times per day       Continue the VGo20 with Fiasp   Cut back on clicks with increasing mounjaro    2-3 clicks with breakfast    3 clicks with lunch  2-3 clicks with dinner     Give clicks before the meals not after      Always wear VGo      Change the VGo every 24 hours -- same times every day      If you are having lows you need to let me know      Think about omnipod 5      Increase Mounjaro to 10 mg weekly   With increasing the doses --  you may need to cut back on clicks with meals on th VGo   Let me know if lows happen -- we may need to consider coming off VGo or cutting back on Metformin          -- Reviewed goals of therapy are to get the best control we can without hypoglycemia.  -- Reviewed patient's current insulin regimen. Clarified proper insulin dose and timing in relation to meals, etc. Insulin injection sites and proper rotation instructed.    -- Advised frequent self blood glucose monitoring.   Patient encouraged to document glucose results and bring them to every clinic visit. Continue to use Dexcom G6   -- Hypoglycemia precautions discussed. Instructed on precautions before driving.    -- Call for Bg repeatedly < 70 or > 180.   -- Close adherence to lifestyle changes recommended.   -- Periodic follow ups for eye evaluations, foot care and dental care suggested.        Patient has diabetes mellitus and manages diabetes with intensive insulin regimen and uses prandial and basal insulin daily-- Via VGO   Patient requires a therapeutic CGM and is willing to use therapeutic CGM for the necessary frequent adjustments of insulin therapy.  I have completed an in-person visit during the previous 6 months and will continue to have in-person visits every 6 months to assess adherence to their CGM regimen and diabetes treatment plan.  Due to COVID pandemic and need for remote monitoring this tool is medically necessary      HTN (hypertension)  BP goal is < 140/90.   Tolerating ACEi  Controlled   Blood pressure goals discussed with patient      Dyslipidemia, goal LDL below 100  LDL goal < 100.   LDL and total cholesterol above goal  She has been off her Crestor  Instructed her to get back on Crestor and will repeat lipids and CMP in 4 month  Previously well controlled on current dose.         Class 1 obesity due to excess calories with serious comorbidity and body mass index (BMI) of 34.0 to 34.9 in adult  Body mass index is 34.96 kg/m².  Increases insulin resistance.   Discussed DM diet and exercise.           I spent a total of 30 minutes on the day of the visit.This includes face to face time and non-face to face time preparing to see the patient (eg, review of tests), obtaining and/or reviewing separately obtained history, documenting clinical information in the electronic or other health record, independently interpreting results and communicating results to the patient/family/caregiver, or care  coordinator.          Follow up in about 4 months (around 5/30/2024).-   Follow up with me in 4 months with labs prior         Orders Placed This Encounter   Procedures    Hemoglobin A1C     Standing Status:   Future     Standing Expiration Date:   7/30/2025    Comprehensive Metabolic Panel     Standing Status:   Future     Standing Expiration Date:   7/30/2025    Lipid Panel     Standing Status:   Future     Standing Expiration Date:   7/30/2025         Recommendations were discussed with the patient in detail  The patient verbalized understanding and agrees with the plan outlined as above.     This note was partly generated with [employer] voice recognition software. I apologize for any possible typographical errors.

## 2024-01-30 NOTE — ASSESSMENT & PLAN NOTE
Body mass index is 34.96 kg/m².  Increases insulin resistance.   Discussed DM diet and exercise.

## 2024-01-31 DIAGNOSIS — E11.65 TYPE 2 DIABETES MELLITUS WITH HYPERGLYCEMIA, WITH LONG-TERM CURRENT USE OF INSULIN: ICD-10-CM

## 2024-01-31 DIAGNOSIS — Z79.4 TYPE 2 DIABETES MELLITUS WITH HYPERGLYCEMIA, WITH LONG-TERM CURRENT USE OF INSULIN: ICD-10-CM

## 2024-01-31 RX ORDER — BLOOD-GLUCOSE SENSOR
1 EACH MISCELLANEOUS
Qty: 3 EACH | Refills: 11 | Status: SHIPPED | OUTPATIENT
Start: 2024-01-31 | End: 2024-03-18 | Stop reason: ALTCHOICE

## 2024-01-31 RX ORDER — BLOOD-GLUCOSE TRANSMITTER
1 EACH MISCELLANEOUS
Qty: 1 EACH | Refills: 4 | Status: SHIPPED | OUTPATIENT
Start: 2024-01-31 | End: 2024-03-18

## 2024-01-31 NOTE — TELEPHONE ENCOUNTER
----- Message from Myrna Tapia sent at 1/31/2024 11:15 AM CST -----  Contact: Self/ [phone]  [email or web address]     Patient is calling for Medical Advice regarding:need rx sent to another pharmacy     Pharmacy name and phone#:[facility] [id number]  [place] [address] [facility]  [address] [facility]  [place] [address]  Phone: [phone] Fax: [phone]     Would like response via [employer]: Would like a return call     Comments: pt stated that she needs the rx for blood-glucose sensor (DEXCOM G6 SENSOR) [facility]e and blood-glucose transmitter (DEXCOM G6 TRANSMITTER) Leslye sent to [facility] due to insurance. Please advise

## 2024-03-08 ENCOUNTER — TELEPHONE (OUTPATIENT)
Dept: DIABETES | Facility: CLINIC | Age: 74
End: 2024-03-08
Payer: MEDICARE

## 2024-03-08 NOTE — TELEPHONE ENCOUNTER
Spoke to pt stated she will discuss dexcom g7 with provider at upcoming visit, stated she did not want to push her appointment up at this time

## 2024-03-14 ENCOUNTER — CLINICAL SUPPORT (OUTPATIENT)
Dept: DIABETES | Facility: CLINIC | Age: 74
End: 2024-03-14
Payer: MEDICARE

## 2024-03-14 DIAGNOSIS — E11.65 TYPE 2 DIABETES MELLITUS WITH HYPERGLYCEMIA, WITH LONG-TERM CURRENT USE OF INSULIN: Primary | ICD-10-CM

## 2024-03-14 DIAGNOSIS — Z79.4 TYPE 2 DIABETES MELLITUS WITH HYPERGLYCEMIA, WITH LONG-TERM CURRENT USE OF INSULIN: Primary | ICD-10-CM

## 2024-03-14 PROCEDURE — G0108 DIAB MANAGE TRN  PER INDIV: HCPCS | Mod: PBBFAC,PN | Performed by: DIETITIAN, REGISTERED

## 2024-03-14 PROCEDURE — 99999PBSHW PR PBB SHADOW TECHNICAL ONLY FILED TO HB: Mod: PBBFAC,,,

## 2024-03-14 RX ORDER — TIRZEPATIDE 12.5 MG/.5ML
12.5 INJECTION, SOLUTION SUBCUTANEOUS
Qty: 4 PEN | Refills: 4 | Status: SHIPPED | OUTPATIENT
Start: 2024-03-14 | End: 2024-06-10

## 2024-03-15 ENCOUNTER — CLINICAL SUPPORT (OUTPATIENT)
Dept: DIABETES | Facility: CLINIC | Age: 74
End: 2024-03-15
Payer: MEDICARE

## 2024-03-15 DIAGNOSIS — Z79.4 TYPE 2 DIABETES MELLITUS WITH HYPERGLYCEMIA, WITH LONG-TERM CURRENT USE OF INSULIN: Primary | ICD-10-CM

## 2024-03-15 DIAGNOSIS — E11.65 TYPE 2 DIABETES MELLITUS WITH HYPERGLYCEMIA, WITH LONG-TERM CURRENT USE OF INSULIN: Primary | ICD-10-CM

## 2024-03-15 PROCEDURE — G0108 DIAB MANAGE TRN  PER INDIV: HCPCS | Mod: PBBFAC,PN | Performed by: DIETITIAN, REGISTERED

## 2024-03-15 PROCEDURE — 99999PBSHW PR PBB SHADOW TECHNICAL ONLY FILED TO HB: Mod: PBBFAC,,,

## 2024-03-15 NOTE — TELEPHONE ENCOUNTER
Patient started a sample of the dexcom G7 yesterday and would like a [facility] to [facility] for the G7 sensors.  She is also interested in getting the omnipod 5.    Thank you

## 2024-03-18 ENCOUNTER — TELEPHONE (OUTPATIENT)
Dept: DIABETES | Facility: CLINIC | Age: 74
End: 2024-03-18
Payer: MEDICARE

## 2024-03-18 DIAGNOSIS — Z79.4 TYPE 2 DIABETES MELLITUS WITH HYPERGLYCEMIA, WITH LONG-TERM CURRENT USE OF INSULIN: ICD-10-CM

## 2024-03-18 DIAGNOSIS — E11.65 TYPE 2 DIABETES MELLITUS WITH HYPERGLYCEMIA, WITH LONG-TERM CURRENT USE OF INSULIN: ICD-10-CM

## 2024-03-18 RX ORDER — INSULIN ASPART INJECTION 100 [IU]/ML
INJECTION, SOLUTION SUBCUTANEOUS
Qty: 30 ML | Refills: 6 | Status: SHIPPED | OUTPATIENT
Start: 2024-03-18 | End: 2024-06-10 | Stop reason: SDUPTHER

## 2024-03-18 RX ORDER — SUB-Q INSULIN DEVICE, 40 UNIT
EACH MISCELLANEOUS
Qty: 30 EACH | Refills: 6 | Status: SHIPPED | OUTPATIENT
Start: 2024-03-18 | End: 2024-05-08

## 2024-03-18 RX ORDER — BLOOD-GLUCOSE TRANSMITTER
1 EACH MISCELLANEOUS
Qty: 1 EACH | Refills: 4 | Status: SHIPPED | OUTPATIENT
Start: 2024-03-18 | End: 2024-06-10 | Stop reason: SDUPTHER

## 2024-03-18 RX ORDER — BLOOD-GLUCOSE SENSOR
1 EACH MISCELLANEOUS
Qty: 3 EACH | Refills: 11 | Status: SHIPPED | OUTPATIENT
Start: 2024-03-18 | End: 2024-06-10 | Stop reason: SDUPTHER

## 2024-03-18 RX ORDER — INSULIN PMP CART,AUT,G6/7,CNTR
1 EACH SUBCUTANEOUS
Qty: 2 EACH | Refills: 11 | Status: SHIPPED | OUTPATIENT
Start: 2024-03-18 | End: 2024-03-20 | Stop reason: SDUPTHER

## 2024-03-18 RX ORDER — BLOOD-GLUCOSE SENSOR
1 EACH MISCELLANEOUS
Qty: 3 EACH | Refills: 11 | Status: SHIPPED | OUTPATIENT
Start: 2024-03-18 | End: 2024-03-18

## 2024-03-18 RX ORDER — INSULIN PMP CART,AUT,G6/7,CNTR
1 EACH SUBCUTANEOUS
Qty: 1 EACH | Refills: 0 | Status: SHIPPED | OUTPATIENT
Start: 2024-03-18 | End: 2024-03-18 | Stop reason: SDUPTHER

## 2024-03-18 RX ORDER — INSULIN PMP CART,AUT,G6/7,CNTR
1 EACH SUBCUTANEOUS
Qty: 1 EACH | Refills: 0 | Status: SHIPPED | OUTPATIENT
Start: 2024-03-18 | End: 2024-03-20 | Stop reason: SDUPTHER

## 2024-03-18 NOTE — TELEPHONE ENCOUNTER
Annabel for dexcom G6   She needs dexcom G6 if she is going to do omnipod 5.   Maybe put the dexcom g6 order in the parachute thing?     I can send the omnipod 5 starter kit to [facility] in [place]

## 2024-03-18 NOTE — TELEPHONE ENCOUNTER
----- Message from Tanya Jha LPN sent at 3/18/2024  1:40 PM CDT -----  Pt also states she will need insulin to last until visit earliest visit for Alisa isn't until April, can you sen din insulin for her until she starts pump said she will run out soon

## 2024-03-18 NOTE — TELEPHONE ENCOUNTER
[facility]oke to [facility] stated that [facility] that the omnipod was 200 dollars stated she cannot afford the omnipod, also informed pt that the v-go refills was sent over to the pharmacy

## 2024-03-18 NOTE — PROGRESS NOTES
Diabetes Care Specialist Progress Note  Author: Alisa Daley RD, CDE  Date: 3/18/2024    Program Intake  Reason for Diabetes Program Visit:: Intervention  Type of Intervention:: Individual  Individual: Device Training  Device Training: Personal CGM  Current diabetes risk level:: low  In the last 12 months, have you:: none  Permission to speak with others about care:: no  Continuous Glucose Monitoring  Patient has CGM: Yes  Personal CGM type:: starting Dexcom G7 today    Lab Results   Component Value Date    LABA1C 7.7 (H) 02/28/2018    HGBA1C 6.0 (H) 01/18/2024       Clinical            There is no height or weight on file to calculate BMI.    Patient Health Rating  Compared to other people your age, how would you rate your health?: Good    Problem Review  Reviewed Problem List with Patient: yes  Active comorbidities affecting diabetes self-care.: no  Reviewed health maintenance: yes    Clinical Assessment  Current Diabetes Treatment: Oral Medication, Insulin, Injectable (mounjaro, metformin, VGo20, fiasp, baqsimi)  Have you ever experienced hypoglycemia (low blood sugar)?: no  Have you ever experienced hyperglycemia (high blood sugar)?: no    Medication Information  How do you obtain your medications?: Patient drives  How many days a week do you miss your medications?: Never  Do you use a pill box or medication chart to help you manage your medications?: No  Do you sometimes have difficulty refilling your medications?: No  Medication adherence impacting ability to self-manage diabetes?: No    Labs  Do you have regular lab work to monitor your medications?: Yes  Type of Regular Lab Work: A1c, Cholesterol, Microalbumin, CBC, BMP  Where do you get your labs drawn?: [facility]  Lab Compliance Barriers: No    Nutritional Status  Diet: Regular  Meal Plan 24 Hour Recall: Breakfast, Lunch, Dinner, Snack  Meal Plan 24 Hour Recall - Breakfast: toast or bagel and coffee- with sweet and low and SF creamer  Meal Plan 24 Hour  Recall - Lunch: sister in law cooks every day  Meal Plan 24 Hour Recall - Dinner: home cooked  Meal Plan 24 Hour Recall - Snack: peanuts and candy sometimes  Change in appetite?: No  Dentation:: Intact  Recent Changes in Weight: No Recent Weight Change  Current nutritional status an area of need that is impacting patient's ability to self-manage diabetes?: No    Additional Social History    Support  Does anyone support you with your diabetes care?: yes  Who supports you?: self, friend, family member, son/daughter  Who takes you to your medical appointments?: self  Does the current support meet the patient's needs?: Yes  Is Support an area impacting ability to self-manage diabetes?: No    Access to Mass Media & Technology  Does the patient have access to any of the following devices or technologies?: Smart phone, Home computer, Internet Access  Media or technology needs impacting ability to self-manage diabetes?: No    Cognitive/Behavioral Health  Alert and Oriented: Yes  Difficulty Thinking: No  Requires Prompting: No  Requires assistance for routine expression?: No  Cognitive or behavioral barriers impacting ability to self-manage diabetes?: No    Culture/Synagogue  Culture or Hoahaoism beliefs that may impact ability to access healthcare: No    Communication  Language preference: English  Hearing Problems: No  Vision Problems: No  Communication needs impacting ability to self-manage diabetes?: No    Health Literacy  Preferred Learning Method: Face to Face  How often do you need to have someone help you read instructions, pamphlets, or written material from your doctor or pharmacy?: Sometimes  Health literacy needs impacting ability to self-manage diabetes?: No      Diabetes Self-Management Skills Assessment    Diabetes Disease Process/Treatment Options  Patient/caregiver able to state what happens when someone has diabetes.: yes  Patient/caregiver knows what type of diabetes they have.: yes  Diabetes Type : Type  II  Patient/caregiver able to identify at least three signs and symptoms of diabetes.: yes  Identified signs and symptoms:: fatigue, frequent urination, increased thirst  Patient able to identify at least three risk factors for diabetes.: yes  Identified risk factors:: age over 40, being overweight, family history  Diabetes Disease Process/Treatment Options: Skills Assessment Completed: Yes  Assessment indicates:: Adequate understanding  Area of need?: No    Nutrition/Healthy Eating  Method of carbohydrate measurement:: no method  Patient can identify foods that impact blood sugar.: yes  Patient-identified foods:: sweets, starches (bread, pasta, rice, cereal), soda, fruit/fruit juice, milk, starchy vegetables (corn, peas, beans), yogurt  Nutrition/Healthy Eating Skills Assessment Completed:: Yes  Assessment indicates:: Adequate understanding  Area of need?: No    Physical Activity/Exercise  Patient's daily activity level:: sedentary  Patient formally exercises outside of work.: no  Reasons for not exercising:: other (see comments) (weather and Covid-19)  Patient can identify forms of physical activity.: yes  Stated forms of physical activity:: moving to burn calories, yardwork  Patient can identify reasons why exercise/physical activity is important in diabetes management.: no  Physical Activity/Exercise Skills Assessment Completed: : Yes  Assessment indicates:: Adequate understanding  Area of need?: No    Medications  Patient is able to describe current diabetes management routine.: yes  Diabetes management routine:: insulin, oral medications, injectable medications  Patient is able to identify current diabetes medications, dosages, and appropriate timing of medications.: yes  Patient understands the purpose of the medications taken for diabetes.: yes  Patient reports problems or concerns with current medication regimen.: yes  Medication regimen problems/concerns:: lack of training (vgo iritating skin, will review  alternative options available)  Medication Skills Assessment Completed:: Yes  Assessment indicates:: Knowledge deficit, Instruction Needed  Area of need?: Yes    Home Blood Glucose Monitoring  Patient states that blood sugar is checked at home daily.: yes  Monitoring Method:: personal continuous glucose monitor  Personal CGM type:: starting Dexcom G7 today  Patient is able to use personal CGM appropriately.: no  CGM Report reviewed?: no  Unable to download personal CGM: needs to update apple account, unable to access ana store to download G7 ana  Home Blood Glucose Monitoring Skills Assessment Completed: : Yes  Assessment indicates:: Knowledge deficit, Instruction Needed  Area of need?: Yes    Acute Complications  Patient is able to identify types of acute complications: Yes  Patient Identified:: Hypoglycemia, Hyperglycemia  Patient is able to state the basic meaning of hypoglycemia?: Yes  Able to state the blood sugar range for hypoglycemia?: yes  Patient stated range:: <70  Patient can identify general symptoms of hypoglycemia: yes  Patient identified:: shakiness, rapid heartbeat, dizziness, sweating, anxiety  Able to state proper treatment of hypoglycemia?: yes  Patient identified:: 1/2 can soda/fruit juice  Patient is able to state the basic meaning of hyperglycemia?: Yes  Able to state the blood sugar range for hyperglycemia?: yes  Patient stated range:: >250  Patient able to state proper treatment of hyperglycemia?: yes  Patient identified:: take medication as recommended, monitor blood sugar  Patient able to verbalize sick day plan?: no  Acute Complications Skills Assessment Completed: : Yes  Assessment indicates:: Adequate understanding  Area of need?: No    Chronic Complications  Patient can identify major chronic complications of diabetes.: yes  Stated chronic complications:: kidney disease, neuropathy/nerve damage, retinopathy, heart disease/heart attack, stroke  Patient can identify ways to prevent or  delay diabetes complications.: yes  Stated ways to prevent complications:: controlling blood sugar, controlling cholesterol and triglycerides, healthy eating and regular activity, maintaining optimal blood glucose control  Patient is aware that having diabetes increases risk of heart disease?: Yes  Patient is aware that heart disease is the leading cause of death and disability in people with diabetes?: Yes  Patient able to state risk factors for heart disease?: Yes  Patient stated risk factors for heart disease:: High blood pressure, High cholesterol, Medication non-adherance, Diet, Having diabetes  Patient is taking statin?: Yes  Chronic Complications Skills Assessment Completed: : Yes  Assessment indicates:: Adequate understanding  Area of need?: No    Psychosocial/Coping  Patient can identify ways of coping with chronic disease.: yes  Patient-stated ways of coping with chronic disease:: support from loved ones  Psychosocial/Coping Skills Assessment Completed: : Yes  Assessment indicates:: Adequate understanding  Area of need?: No      Assessment Summary and Plan    Based on today's diabetes care assessment, the following areas of need were identified:          3/14/2024    12:01 AM   Social   Support No   Access to Mass Media/Tech No   Cognitive/Behavioral Health No   Culture/Voodoo No   Communication No   Health Literacy No            3/14/2024    12:01 AM   Clinical   Medication Adherence No   Lab Compliance No   Nutritional Status No            3/14/2024    12:01 AM   Diabetes Self-Management Skills   Diabetes Disease Process/Treatment Options No   Nutrition/Healthy Eating No   Physical Activity/Exercise No   Medication Yes, patient is experiencing irritation at Vgo site, discussed alternatives to the Vgo such as going soliqua or daily injections, discussed the omnipod dash and omnipod 5, as well as other pump options. Discussed Medtronic 780G series Pump and the Guardian Sensor; Tandem T-Slim and control  "IQ; Tandem Mobi; Beta Bionics; and OmniPod Dash /OmniPod 5 Automated mode.    Patient educated on insulin pump therapy, the purpose of insulin pump therapy, advantages and disadvantages of insulin pump therapy and/vs multiple daily injections, what a basal rate and bolus dose are, when to change infusion site and tubing, demonstrated how to prepare the syringe/cartridge and tubing (except for OmniPod) for use in the pump  Discussed ease of usage, infusion sets, communication with the sensor, basal and bolus, carbohydrate to insulin ration, correction factors, approved areas to insert set, carbohydrate counting, error messages, how to disconnect and reconnect the cartridge and tubing   Patient instructed that based on current insulin regimen she would be changing infusion set daily.  Patient is interested in the omnipod 5 with set dose   Home Blood Glucose Monitoring Yes, started the dexcom G7 at time of visit with the , returned 3/15 to put on phone.  Dexcom G7 Education  Patient is here in clinic today for initial start of Dexcom continuous glucose monitoring system (CGMA). Reviewed with patient how to insert sensor. Patient inserted sensor on right upper arm. Time and date was set on monitor and settings were set. Hypoglycemia trigger set for 70 and hyperglycemia set for 250. Patient provided with phone number for 24-hour help line if needed. Discussed various types of possible alarms and what to do. Questions addressed. Initialization finished. No futher questions.  Dexcom G7 mobile ana downloaded to phone on 3/15/2024. Education was provided using "Quick Start Guide" per Dexcom protocol.     Pt will be using their phone/ as the primary .  Overview:  5min glucose reading updates, trending arrows, BG graph screens, battery life indicator, Blue Tooth Symbol.  Menus: trend Graph, start sensor, enter BG, events, Alerts, Settings, Shutdown, Stop Sensor   Settings:                        "   * Urgent Low: 55 mg/dL                          * Urgent Low Soon: Off                          * Low alert: 70                          * High alert: 250                          * Rise rate: Off                          * Fall Rate: Off                          * Signal Loss: 30 min                          * No Reading: Off                          * Always sound: On                             Reviewed additional setting options with patient, including Graph Height. Sensor was paired with /phone.    Reviewed where to find sensor insertion time and sensor expiration date.   Discussed no need to calibrate sensor during the entirety of the 10 day wear. Discussed that pt can calibrate sensor if desired, Reviewed appropriate calibration techniques.  Reviewed sensor site selection. Site selected and prepped using aseptic technique Inserted to right upper arm.  Practiced sensor removal from site and disposal.  Patient able to demonstrate without difficulty.  Encouraged to follow-up prior to starting another sensor.   Reviewed problem solving aspects of sensor transmission/variables that can disrupt RF transmission.  range 20 feet, but the first 3 hrs keep within 3 feet of transmitter.  Pt instructed on lag time of interstitial fluid from CBG and was advised to tx hypoglycemia and dose insulin based on SMBG values.  Link to video provided and written instructions provided for patient to review before 10 day sensor change  Dexcom technical support contact number given and examples of when to contact them discussed.    Acute Complications No   Chronic Complications No   Psychosocial/Coping No          Today's interventions were provided through individual discussion, instruction, and written materials were provided.      Patient verbalized understanding of instruction and written materials.  Pt was able to return back demonstration of instructions today. Patient understood key points, needs reinforcement  and further instruction.     Diabetes Self-Management Care Plan:    Today's Diabetes Self-Management Care Plan was developed with Skip's input. Skip has agreed to work toward the following goal(s) to improve his/her overall diabetes control.      Care Plan: Diabetes Management   Updates made since 2/17/2024 12:00 AM        Problem: Healthy Eating         Long-Range Goal: Eat 3 meals daily with 30-45g/2-3 servings of Carbohydrate per meal.    Start Date: 3/3/2021   This Visit's Progress: On track   Priority: Medium   Barriers: No Barriers Identified   Note:    Plan is to review carb counting, portion control, importance of spacing meals throughout the day to prevent post prandial elevations.  Recommended low saturated fat, low sodium diet to aid in control of hypertension and cholesterol. Not completed 3/3 due to time       Task: Reviewed the sources and role of Carbohydrate, Protein, and Fat and how each nutrient impacts blood sugar. Completed 3/18/2024        Problem: Acute Complications Resolved 3/14/2024        Problem: Chronic Complications         Goal: Patient agrees to have the following diabetes jessica maintenance screenings/appointments eye exam completed in the next 6 months.    Start Date: 3/3/2021   This Visit's Progress: On track   Priority: Low   Barriers: No Barriers Identified   Note:    ABC's of Diabetes   Discussed imporance of A1c less than 7 to reduce risk of micro and macro complications, controlled Blood Pressure 130/80 and Cholesterol Lab Values--Total Cholesterol <200mg, LDL < 100, HDL >45 men and >50 women, Triglycerides <150mg for prevention heart disease, heart attach, stroke. Diabetes Care Schedule   A1c every 3 to 6 months  Lipid Panel every year  Microalbumin (urine test) once per year  Comprehensive Foot Exam once per year  Dilated Eye Exam at least once per year  Dental exam every 6 months  Flu Vaccination yearly   Shingles and Phneumoia Vaccination as recommended by physician          Task: Discussed importance of annual microalbumin urine test to monitor kidney function. Completed 3/18/2024        Task: Discussed importance of annual dilated eye exam for prevention of retinopathy. Completed 3/18/2024        Task: Discussed the importance of routine dental exams for the prevention of gum disease and gingivitis and encouraged dental exam every 6 months. Completed 3/18/2024        Task: Instructed on basic daily foot care and encouraged inspecting feet daily. Completed 3/18/2024        Task: Discussed importance of the Flu and Pneumonia Vaccination. Completed 3/18/2024          Follow Up Plan     Follow up in about 4 weeks (around 4/11/2024) for General Follow-up, Insulin Pump Start, Personal CGM Upload.    Today's care plan and follow up schedule was discussed with patient.  Loyce verbalized understanding of the care plan, goals, and agrees to follow up plan.        The patient was encouraged to communicate with his/her health care provider/physician and care team regarding his/her condition(s) and treatment.  I provided the patient with my contact information today and encouraged to contact me via phone or [facility] Patient Portal as needed.     Length of Visit   Total Time: 70 Minutes

## 2024-03-18 NOTE — TELEPHONE ENCOUNTER
----- Message from Tanya Jha LPN sent at 3/18/2024  1:19 PM CDT -----  Hey [employer] stated she will have to get g6 from supply company , and also they do not have the omnipod 5 intro kits, but they do have the pods, what would you like to do ? And I will reach out to [facility] for dexcom g6

## 2024-03-18 NOTE — PROGRESS NOTES
Diabetes Care Specialist Follow-up Note  Author: Alisa Daley RD, CDE  Date: 3/18/2024    Program Intake  Reason for Diabetes Program Visit:: Intervention  Type of Intervention:: Individual  Individual: Device Training  Device Training: Personal CGM  Current diabetes risk level:: low  In the last 12 months, have you:: none  Permission to speak with others about care:: no  Continuous Glucose Monitoring  Patient has CGM: Yes  Personal CGM type:: putting Dexcom G7 on phone at time of visit    Lab Results   Component Value Date    LABA1C 7.7 (H) 02/28/2018    HGBA1C 6.0 (H) 01/18/2024     A1c Pre Diabetes Care Specialist Intervention:  7.7%    Clinical    Patient Health Rating  Compared to other people your age, how would you rate your health?: Good    Problem Review  Reviewed health maintenance: yes         Medication Information  Medication adherence impacting ability to self-manage diabetes?: No    Labs  Lab Compliance Barriers: No    Nutritional Status  Diet: Regular  Meal Plan 24 Hour Recall: Breakfast, Lunch, Dinner, Snack  Meal Plan 24 Hour Recall - Breakfast: toast or bagel and coffee- with sweet and low and SF creamer  Meal Plan 24 Hour Recall - Lunch: sister in law cooks every day  Meal Plan 24 Hour Recall - Dinner: home cooked  Meal Plan 24 Hour Recall - Snack: peanuts and candy sometimes  Current nutritional status an area of need that is impacting patient's ability to self-manage diabetes?: No    Physical activity/Exercise:   No change    SMBG: here to put G7 ana on phone    Additional Social History    Support  Does the current support meet the patient's needs?: Yes  Is Support an area impacting ability to self-manage diabetes?: No    Access to Mass Media & Technology  Media or technology needs impacting ability to self-manage diabetes?: No    Cognitive/Behavioral Health  Cognitive or behavioral barriers impacting ability to self-manage diabetes?: No    Culture/Christianity  Culture or Islam beliefs that  may impact ability to access healthcare: No    Communication  Communication needs impacting ability to self-manage diabetes?: No    Health Literacy  Health literacy needs impacting ability to self-manage diabetes?: No      Diabetes Self-Management Skills Assessment     Diabetes Disease Process/Treatment Options  Diabetes Disease Process/Treatment Options: Skills Assessment Completed: No  Assessment indicates:: Adequate understanding  Deferred due to:: Other (comment) (previously completed, there has been no change and patient has adequate understanding)  Area of need?: No    Nutrition/Healthy Eating  Nutrition/Healthy Eating Skills Assessment Completed:: No  Assessment indicates:: Adequate understanding  Deffered due to:: Other (comment) (previously completed, there has been no change and patient has adequate understanding)  Area of need?: No    Physical Activity/Exercise  Physical Activity/Exercise Skills Assessment Completed: : No  Assessment indicates:: Adequate understanding  Deffered due to:: Other (comment) (previously completed, there has been no change and patient has adequate understanding)  Area of need?: No    Medications  Patient is able to describe current diabetes management routine.: yes  Diabetes management routine:: insulin, oral medications, injectable medications  Patient is able to identify current diabetes medications, dosages, and appropriate timing of medications.: yes  Patient understands the purpose of the medications taken for diabetes.: yes  Patient reports problems or concerns with current medication regimen.: yes  Medication regimen problems/concerns:: lack of training (will need instruction on the omnipod 5)  Medication Skills Assessment Completed:: Yes  Assessment indicates:: Knowledge deficit, Instruction Needed  Area of need?: Yes    Home Blood Glucose Monitoring  Patient states that blood sugar is checked at home daily.: yes  Monitoring Method:: personal continuous glucose  monitor  Personal CGM type:: putting Dexcom G7 on phone at time of visit  Patient is able to use personal CGM appropriately.: yes  CGM Report reviewed?: no  Home Blood Glucose Monitoring Skills Assessment Completed: : Yes  Assessment indicates:: Knowledge deficit, Instruction Needed  Area of need?: Yes    Acute Complications  Acute Complications Skills Assessment Completed: : No  Assessment indicates:: Adequate understanding  Deffered due to:: Other (comment) (previously completed, there has been no change and patient has adequate understanding)  Area of need?: No    Chronic Complications  Chronic Complications Skills Assessment Completed: : No  Assessment indicates:: Adequate understanding  Deferred due to:: Other (comment) (previously completed, there has been no change and patient has adequate understanding)  Area of need?: No    Psychosocial/Coping  Psychosocial/Coping Skills Assessment Completed: : No  Assessment indicates:: Adequate understanding  Deffered due to:: Other (comment) (previously completed, there has been no change and patient has adequate understanding)  Area of need?: No      During today's follow-up visit,  the following areas required further assessment and content was provided/reviewed.    Based on today's diabetes care assessment, the following areas of need were identified:          3/15/2024    12:01 AM   Social   Support No   Access to Mass Media/Tech No   Cognitive/Behavioral Health No   Culture/Sabianist No   Communication No   Health Literacy No            3/15/2024    12:01 AM   Clinical   Medication Adherence No   Lab Compliance No   Nutritional Status No            3/15/2024    12:01 AM   Diabetes Self-Management Skills   Diabetes Disease Process/Treatment Options No   Nutrition/Healthy Eating No   Physical Activity/Exercise No   Medication Yes, omnipod 5 ordered for patient   Home Blood Glucose Monitoring Yes, added the dexcom G7 ana to phone  Dexcom G7 Education  Patient is here in  "clinic today for initial start of Dexcom continuous glucose monitoring system (CGMA). Reviewed with patient how to insert sensor. Patient inserted sensor on right upper arm. Time and date was set on monitor and settings were set. Hypoglycemia trigger set for 70 and hyperglycemia set for 250. Patient provided with phone number for 24-hour help line if needed. Discussed various types of possible alarms and what to do. Questions addressed. Initialization finished. No futher questions.  Dexcom Crusader Vapor mobile ana downloaded to phone. Education was provided using "Quick Start Guide" per Dexcom protocol.     Pt will be using their phone/ as the primary .  Overview:  5min glucose reading updates, trending arrows, BG graph screens, battery life indicator, Blue Tooth Symbol.  Menus: trend Graph, start sensor, enter BG, events, Alerts, Settings, Shutdown, Stop Sensor   Settings:                          * Urgent Low: 55 mg/dL                          * Urgent Low Soon: Off                          * Low alert: 70                          * High alert: 250                          * Rise rate: Off                          * Fall Rate: Off                          * Signal Loss: 30 min                          * No Reading: Off                          * Always sound: On                             Reviewed additional setting options with patient, including Graph Height. Sensor was paired with /phone.    Reviewed where to find sensor insertion time and sensor expiration date.   Discussed no need to calibrate sensor during the entirety of the 10 day wear. Discussed that pt can calibrate sensor if desired, Reviewed appropriate calibration techniques.  Reviewed sensor site selection. Site selected and prepped using aseptic technique Inserted to right upper arm.  Practiced sensor removal from site and disposal.  Patient able to demonstrate without difficulty.  Encouraged to follow-up prior to starting " another sensor.   Reviewed problem solving aspects of sensor transmission/variables that can disrupt RF transmission.  range 20 feet, but the first 3 hrs keep within 3 feet of transmitter.  Pt instructed on lag time of interstitial fluid from CBG and was advised to tx hypoglycemia and dose insulin based on SMBG values.  Link to video provided and written instructions provided for patient to review before 10 day sensor change  Dexcom technical support contact number given and examples of when to contact them discussed.    Acute Complications No   Chronic Complications No   Psychosocial/Coping No        Today's interventions were provided through individual discussion, instruction, and written materials were provided.    Patient verbalized understanding of instruction and written materials.  Pt was able to return back demonstration of instructions today. Patient understood key points, needs reinforcement and further instruction.     Diabetes Self-Management Care Plan Review and Evaluation of Progress:    During today's follow-up Skip's Diabetes Self-Management Care Plan progress was reviewed and progress was evaluated including his/her input. Skip has agreed to continue his/her journey to improve/maintain overall diabetes control by continuing to set health goals. See care plan progress below.      Care Plan: Diabetes Management   Updates made since 2/17/2024 12:00 AM        Problem: Healthy Eating         Long-Range Goal: Eat 3 meals daily with 30-45g/2-3 servings of Carbohydrate per meal.    Start Date: 3/3/2021   This Visit's Progress: On track   Recent Progress: On track   Priority: Medium   Barriers: No Barriers Identified   Note:    Plan is to review carb counting, portion control, importance of spacing meals throughout the day to prevent post prandial elevations.  Recommended low saturated fat, low sodium diet to aid in control of hypertension and cholesterol. Not completed 3/3 due to time       Task:  Reviewed the sources and role of Carbohydrate, Protein, and Fat and how each nutrient impacts blood sugar. Completed 3/18/2024        Problem: Medications    Note:    Plan to switch to the omnipod 5 from the VGO       Problem: Acute Complications Resolved 3/14/2024        Problem: Chronic Complications         Goal: Patient agrees to have the following diabetes jessica maintenance screenings/appointments eye exam completed in the next 6 months.    Start Date: 3/3/2021   This Visit's Progress: On track   Recent Progress: On track   Priority: Low   Barriers: No Barriers Identified   Note:    ABC's of Diabetes   Discussed imporance of A1c less than 7 to reduce risk of micro and macro complications, controlled Blood Pressure 130/80 and Cholesterol Lab Values--Total Cholesterol <200mg, LDL < 100, HDL >45 men and >50 women, Triglycerides <150mg for prevention heart disease, heart attach, stroke. Diabetes Care Schedule   A1c every 3 to 6 months  Lipid Panel every year  Microalbumin (urine test) once per year  Comprehensive Foot Exam once per year  Dilated Eye Exam at least once per year  Dental exam every 6 months  Flu Vaccination yearly   Shingles and Phneumoia Vaccination as recommended by physician         Task: Discussed importance of annual microalbumin urine test to monitor kidney function. Completed 3/18/2024        Task: Discussed importance of annual dilated eye exam for prevention of retinopathy. Completed 3/18/2024        Task: Discussed the importance of routine dental exams for the prevention of gum disease and gingivitis and encouraged dental exam every 6 months. Completed 3/18/2024        Task: Instructed on basic daily foot care and encouraged inspecting feet daily. Completed 3/18/2024        Task: Discussed importance of the Flu and Pneumonia Vaccination. Completed 3/18/2024          Follow Up Plan     Follow up in about 4 weeks (around 4/12/2024) for Insulin Pump Start.    Today's care plan and follow up  schedule was discussed with patient.  Zulemayce verbalized understanding of the care plan, goals, and agrees to follow up plan.        The patient was encouraged to communicate with his/her health care provider/physician and care team regarding his/her condition(s) and treatment.  I provided the patient with my contact information today and encouraged to contact me via phone or [facility] Patient Portal as needed.     Length of Visit   Total Time: 30 Minutes

## 2024-03-19 NOTE — TELEPHONE ENCOUNTER
Does she want me to see if the omnipod DASH pods are cheaper??  She can try a sample with [facility] - so the cotnroller will be free.   If she is NOT going to do Omnipod 5-- then she CAN do Dexcom G7  Let me know what she would like to do

## 2024-03-20 ENCOUNTER — NUTRITION (OUTPATIENT)
Dept: DIABETES | Facility: CLINIC | Age: 74
End: 2024-03-20
Payer: MEDICARE

## 2024-03-20 ENCOUNTER — TELEPHONE (OUTPATIENT)
Dept: DIABETES | Facility: CLINIC | Age: 74
End: 2024-03-20
Payer: MEDICARE

## 2024-03-20 DIAGNOSIS — Z79.4 TYPE 2 DIABETES MELLITUS WITH HYPERGLYCEMIA, WITH LONG-TERM CURRENT USE OF INSULIN: Primary | ICD-10-CM

## 2024-03-20 DIAGNOSIS — E11.65 TYPE 2 DIABETES MELLITUS WITH HYPERGLYCEMIA, WITH LONG-TERM CURRENT USE OF INSULIN: Primary | ICD-10-CM

## 2024-03-20 RX ORDER — INSULIN PMP CART,AUT,G6/7,CNTR
1 EACH SUBCUTANEOUS
Qty: 1 EACH | Refills: 0 | Status: SHIPPED | OUTPATIENT
Start: 2024-03-20 | End: 2024-06-10

## 2024-03-20 RX ORDER — INSULIN PMP CART,AUT,G6/7,CNTR
1 EACH SUBCUTANEOUS
Qty: 2 EACH | Refills: 11 | Status: SHIPPED | OUTPATIENT
Start: 2024-03-20 | End: 2024-06-10 | Stop reason: SDUPTHER

## 2024-03-20 NOTE — TELEPHONE ENCOUNTER
Spoke to pt stated that she will be receiving the omnipod intro kit from [facility] pharmacy in 2 days, also stated she will continue to get the dexcom g6 from [employer] [facility]

## 2024-03-20 NOTE — TELEPHONE ENCOUNTER
Ok so I reached out to David Bolaños about this.   He is saying for me to send her Omnipod 5 to [facility] pharmacy. She should be able to get the omnipod 5 starter kit for FREE  He says it will need a PA   He said the prior authorization will need to be approved in order for her to qualify  He then said to tell her to call [facility] and verbally request the free trial of the Omnipod 5  Call [phone]    He said if there is any trouble to call him- David Bolaños      So I am sending the Omnipod 5 starter kit and PODS to [facility]     If she does the Omnipod 5 THEN SHE NEEDS THE DEXCOM G6

## 2024-03-21 NOTE — PROGRESS NOTES
Diabetes Care Specialist Follow-up Note  Author: Alisa Daley RD, CDE  Date: 3/21/2024    Program Intake  Reason for Diabetes Program Visit:: Intervention  Type of Intervention:: Individual  Individual: Device Training  Device Training: Personal CGM  Current diabetes risk level:: low  In the last 12 months, have you:: none  Permission to speak with others about care:: no  Continuous Glucose Monitoring  Patient has CGM: Yes  Personal CGM type:: putting Dexcom G6 on phone at time of visit  GMI Date: 03/21/24  GMI Value: 6.4 %    Lab Results   Component Value Date    LABA1C 7.7 (H) 02/28/2018    HGBA1C 6.0 (H) 01/18/2024     A1c Pre Diabetes Care Specialist Intervention:  6.0%    Clinical  Patient Health Rating  Compared to other people your age, how would you rate your health?: Good    Problem Review  Reviewed health maintenance: yes         Medication Information  Medication adherence impacting ability to self-manage diabetes?: No    Labs  Lab Compliance Barriers: No    Nutritional Status  Diet: Regular  Meal Plan 24 Hour Recall: Breakfast, Lunch, Dinner, Snack  Meal Plan 24 Hour Recall - Breakfast: toast or bagel and coffee- with sweet and low and SF creamer  Meal Plan 24 Hour Recall - Lunch: sister in law cooks every day  Meal Plan 24 Hour Recall - Dinner: home cooked  Meal Plan 24 Hour Recall - Snack: peanuts and candy sometimes  Change in appetite?: No  Current nutritional status an area of need that is impacting patient's ability to self-manage diabetes?: No    Physical activity/Exercise:   No change    SMBG: using the dexcom G7  Comparison of previous CGM data 1/30/2024 to current  Average Glucose 128 decreased from 136  Standard Deviation 30 increased from 25  GMI 6.4 increased from 6.3    Time in Range  0% of time patient was in Very High Range no change from 0%  5% of time patient was in High Range slightly increased from 4%  93% of time patient was in Range slightly decreased from 95%  1% of time patient  was in Low Range slightly increased from <1%  <1% of time patient was in Very Low Range slightly increased from 0%    Additional Social History    Support  Is Support an area impacting ability to self-manage diabetes?: No    Access to Mass Media & Technology  Media or technology needs impacting ability to self-manage diabetes?: No    Cognitive/Behavioral Health  Cognitive or behavioral barriers impacting ability to self-manage diabetes?: No    Culture/Mormonism  Culture or Confucianism beliefs that may impact ability to access healthcare: No    Communication  Communication needs impacting ability to self-manage diabetes?: No    Health Literacy  Health literacy needs impacting ability to self-manage diabetes?: No      Diabetes Self-Management Skills Assessment     Diabetes Disease Process/Treatment Options  Diabetes Disease Process/Treatment Options: Skills Assessment Completed: No  Assessment indicates:: Adequate understanding  Deferred due to:: Other (comment) (previously completed, there has been no change and patient has adequate understanding)  Area of need?: No    Nutrition/Healthy Eating  Nutrition/Healthy Eating Skills Assessment Completed:: No  Assessment indicates:: Adequate understanding  Deffered due to:: Other (comment) (previously completed, there has been no change and patient has adequate understanding)  Area of need?: No    Physical Activity/Exercise  Physical Activity/Exercise Skills Assessment Completed: : No  Assessment indicates:: Adequate understanding  Deffered due to:: Other (comment) (previously completed, there has been no change and patient has adequate understanding)  Area of need?: No    Medications  Patient is able to describe current diabetes management routine.: yes  Diabetes management routine:: insulin, oral medications, injectable medications  Patient is able to identify current diabetes medications, dosages, and appropriate timing of medications.: yes  Patient understands the purpose  of the medications taken for diabetes.: yes  Patient reports problems or concerns with current medication regimen.: yes  Medication regimen problems/concerns:: lack of training (will need instruction on the omnipod 5)  Medication Skills Assessment Completed:: Yes  Assessment indicates:: Knowledge deficit, Instruction Needed  Area of need?: Yes    Home Blood Glucose Monitoring  Patient states that blood sugar is checked at home daily.: yes  Monitoring Method:: personal continuous glucose monitor  Personal CGM type:: putting Dexcom G6 on phone at time of visit  Patient is able to use personal CGM appropriately.: yes  CGM Report reviewed?: yes  Home Blood Glucose Monitoring Skills Assessment Completed: : Yes  Assessment indicates:: Adequate understanding  Area of need?: No    Acute Complications  Acute Complications Skills Assessment Completed: : No  Assessment indicates:: Adequate understanding  Deffered due to:: Other (comment) (previously completed, there has been no change and patient has adequate understanding)  Area of need?: No    Chronic Complications  Chronic Complications Skills Assessment Completed: : No  Assessment indicates:: Adequate understanding  Deferred due to:: Other (comment) (previously completed, there has been no change and patient has adequate understanding)  Area of need?: No    Psychosocial/Coping  Psychosocial/Coping Skills Assessment Completed: : No  Assessment indicates:: Adequate understanding  Deffered due to:: Other (comment) (previously completed, there has been no change and patient has adequate understanding)  Area of need?: No      During today's follow-up visit,  the following areas required further assessment and content was provided/reviewed.    Based on today's diabetes care assessment, the following areas of need were identified:          3/20/2024    12:01 AM   Social   Support No   Access to Mass Media/Tech No   Cognitive/Behavioral Health No   Culture/Temple No    Communication No   Health Literacy No            3/20/2024    12:01 AM   Clinical   Medication Adherence No   Lab Compliance No   Nutritional Status No            3/20/2024    12:01 AM   Diabetes Self-Management Skills   Diabetes Disease Process/Treatment Options No   Nutrition/Healthy Eating No   Physical Activity/Exercise No   Medication Yes, trying to understand which direction we are moving towards. Omnipod 5  is going to cost $200, we will move forward with the Dash if we are unable to get an omnipod 5 at a cheaper price   Home Blood Glucose Monitoring No, dexcom G6 put on phone   Acute Complications No   Chronic Complications No   Psychosocial/Coping No        Today's interventions were provided through individual discussion, instruction, and written materials were provided.    Patient verbalized understanding of instruction and written materials.  Pt was able to return back demonstration of instructions today. Patient understood key points, needs reinforcement and further instruction.     Diabetes Self-Management Care Plan Review and Evaluation of Progress:    During today's follow-up Skip's Diabetes Self-Management Care Plan progress was reviewed and progress was evaluated including his/her input. Skip has agreed to continue his/her journey to improve/maintain overall diabetes control by continuing to set health goals. See care plan progress below.      Care Plan: Diabetes Management   Updates made since 2/20/2024 12:00 AM        Problem: Healthy Eating         Long-Range Goal: Eat 3 meals daily with 30-45g/2-3 servings of Carbohydrate per meal.    Start Date: 3/3/2021   This Visit's Progress: On track   Recent Progress: On track   Priority: Medium   Barriers: No Barriers Identified   Note:    Plan is to review carb counting, portion control, importance of spacing meals throughout the day to prevent post prandial elevations.  Recommended low saturated fat, low sodium diet to aid in control of  hypertension and cholesterol. Not completed 3/3 due to time       Task: Reviewed the sources and role of Carbohydrate, Protein, and Fat and how each nutrient impacts blood sugar. Completed 3/18/2024        Problem: Medications    Note:    Plan to switch to the omnipod 5 from the VGO       Problem: Acute Complications Resolved 3/14/2024        Problem: Chronic Complications         Goal: Patient agrees to have the following diabetes jessica maintenance screenings/appointments eye exam completed in the next 6 months.    Start Date: 3/3/2021   This Visit's Progress: On track   Recent Progress: On track   Priority: Low   Barriers: No Barriers Identified   Note:    ABC's of Diabetes   Discussed imporance of A1c less than 7 to reduce risk of micro and macro complications, controlled Blood Pressure 130/80 and Cholesterol Lab Values--Total Cholesterol <200mg, LDL < 100, HDL >45 men and >50 women, Triglycerides <150mg for prevention heart disease, heart attach, stroke. Diabetes Care Schedule   A1c every 3 to 6 months  Lipid Panel every year  Microalbumin (urine test) once per year  Comprehensive Foot Exam once per year  Dilated Eye Exam at least once per year  Dental exam every 6 months  Flu Vaccination yearly   Shingles and Phneumoia Vaccination as recommended by physician         Task: Discussed importance of annual microalbumin urine test to monitor kidney function. Completed 3/18/2024        Task: Discussed importance of annual dilated eye exam for prevention of retinopathy. Completed 3/18/2024        Task: Discussed the importance of routine dental exams for the prevention of gum disease and gingivitis and encouraged dental exam every 6 months. Completed 3/18/2024        Task: Instructed on basic daily foot care and encouraged inspecting feet daily. Completed 3/18/2024        Task: Discussed importance of the Flu and Pneumonia Vaccination. Completed 3/18/2024          Follow Up Plan     Follow up in about 1 week (around  3/27/2024) for Insulin Pump Start.    Today's care plan and follow up schedule was discussed with patient.  Zulemayce verbalized understanding of the care plan, goals, and agrees to follow up plan.        The patient was encouraged to communicate with his/her health care provider/physician and care team regarding his/her condition(s) and treatment.  I provided the patient with my contact information today and encouraged to contact me via phone or [facility]'s Patient Portal as needed.     Length of Visit   Total Time: 15 Minutes

## 2024-03-27 ENCOUNTER — TELEPHONE (OUTPATIENT)
Dept: DIABETES | Facility: CLINIC | Age: 74
End: 2024-03-27
Payer: MEDICARE

## 2024-03-27 NOTE — TELEPHONE ENCOUNTER
Spoke to [facility] pharmacy stated that pt omnipod intro kit was mailed yesterday, also stated that she would get her pods from [facility] for refills

## 2024-03-28 ENCOUNTER — TELEPHONE (OUTPATIENT)
Dept: DIABETES | Facility: CLINIC | Age: 74
End: 2024-03-28
Payer: MEDICARE

## 2024-04-05 ENCOUNTER — NUTRITION (OUTPATIENT)
Dept: DIABETES | Facility: CLINIC | Age: 74
End: 2024-04-05
Payer: MEDICARE

## 2024-04-05 DIAGNOSIS — E11.65 TYPE 2 DIABETES MELLITUS WITH HYPERGLYCEMIA, WITH LONG-TERM CURRENT USE OF INSULIN: Primary | ICD-10-CM

## 2024-04-05 DIAGNOSIS — Z79.4 TYPE 2 DIABETES MELLITUS WITH HYPERGLYCEMIA, WITH LONG-TERM CURRENT USE OF INSULIN: Primary | ICD-10-CM

## 2024-04-05 PROCEDURE — G0108 DIAB MANAGE TRN  PER INDIV: HCPCS | Mod: PBBFAC,PN | Performed by: DIETITIAN, REGISTERED

## 2024-04-05 PROCEDURE — 99999PBSHW PR PBB SHADOW TECHNICAL ONLY FILED TO HB: Mod: PBBFAC,,,

## 2024-04-05 NOTE — PROGRESS NOTES
Diabetes Care Specialist Follow-up Note  Author: Alisa Daley RD, CDE  Date: 4/5/2024    Program Intake  Reason for Diabetes Program Visit:: Intervention  Type of Intervention:: Individual  Individual: Device Training  Device Training: Insulin Pump Start (omnipod 5)  Current diabetes risk level:: low  In the last 12 months, have you:: none  Permission to speak with others about care:: no  Continuous Glucose Monitoring  Patient has CGM: Yes  Personal CGM type:: putting Dexcom G6 on phone at time of visit  GMI Date: 03/21/24  GMI Value: 6.4 %    Lab Results   Component Value Date    LABA1C 7.7 (H) 02/28/2018    HGBA1C 6.0 (H) 01/18/2024     A1c Pre Diabetes Care Specialist Intervention:  7.7%    Clinical  Patient Health Rating  Compared to other people your age, how would you rate your health?: Good    Problem Review  Reviewed health maintenance: yes    Clinical Assessment  Current Diabetes Treatment: Oral Medication, Injectable, Insulin  Have you ever experienced hypoglycemia (low blood sugar)?: no  Have you ever experienced hyperglycemia (high blood sugar)?: no    Medication Information  Medication adherence impacting ability to self-manage diabetes?: No    Labs  Lab Compliance Barriers: No    Nutritional Status  Diet: Regular  Meal Plan 24 Hour Recall: Breakfast, Lunch, Dinner, Snack  Meal Plan 24 Hour Recall - Breakfast: toast or bagel and coffee- with sweet and low and SF creamer  Meal Plan 24 Hour Recall - Lunch: sister in law cooks every day  Meal Plan 24 Hour Recall - Dinner: home cooked  Meal Plan 24 Hour Recall - Snack: peanuts and candy sometimes  Current nutritional status an area of need that is impacting patient's ability to self-manage diabetes?: No    Physical activity/Exercise:   No change    SMBG: using the dexcom G6      Additional Social History    Support  Does anyone support you with your diabetes care?: yes  Is Support an area impacting ability to self-manage diabetes?: No    Access to Mass  Media & Technology  Media or technology needs impacting ability to self-manage diabetes?: No    Cognitive/Behavioral Health  Cognitive or behavioral barriers impacting ability to self-manage diabetes?: No    Culture/Hindu  Culture or Baptism beliefs that may impact ability to access healthcare: No    Communication  Communication needs impacting ability to self-manage diabetes?: No    Health Literacy  Health literacy needs impacting ability to self-manage diabetes?: No      Diabetes Self-Management Skills Assessment     Diabetes Disease Process/Treatment Options  Diabetes Disease Process/Treatment Options: Skills Assessment Completed: No  Assessment indicates:: Adequate understanding  Deferred due to:: Other (comment) (previously completed, there has been no change and patient has adequate understanding)  Area of need?: No    Nutrition/Healthy Eating  Nutrition/Healthy Eating Skills Assessment Completed:: No  Assessment indicates:: Adequate understanding  Deffered due to:: Other (comment) (previously completed, there has been no change and patient has adequate understanding)  Area of need?: No    Physical Activity/Exercise  Physical Activity/Exercise Skills Assessment Completed: : No  Assessment indicates:: Adequate understanding  Deffered due to:: Other (comment) (previously completed, there has been no change and patient has adequate understanding)  Area of need?: No    Medications  Patient is able to describe current diabetes management routine.: yes  Diabetes management routine:: insulin, oral medications, injectable medications  Patient is able to identify current diabetes medications, dosages, and appropriate timing of medications.: yes  Patient understands the purpose of the medications taken for diabetes.: yes  Patient reports problems or concerns with current medication regimen.: yes  Medication regimen problems/concerns:: lack of training (will need instruction on the omnipod 5)  Medication Skills  Assessment Completed:: Yes  Assessment indicates:: Knowledge deficit, Instruction Needed  Area of need?: Yes    Home Blood Glucose Monitoring  Personal CGM type:: putting Dexcom G6 on phone at time of visit  Home Blood Glucose Monitoring Skills Assessment Completed: : No  Assessment indicates:: Adequate understanding  Deferred due to:: Other (comment)  Area of need?: No    Acute Complications  Acute Complications Skills Assessment Completed: : No  Assessment indicates:: Adequate understanding  Deffered due to:: Other (comment) (previously completed, there has been no change and patient has adequate understanding)  Area of need?: No    Chronic Complications  Chronic Complications Skills Assessment Completed: : No  Assessment indicates:: Adequate understanding  Deferred due to:: Other (comment) (previously completed, there has been no change and patient has adequate understanding)  Area of need?: No    Psychosocial/Coping  Psychosocial/Coping Skills Assessment Completed: : No  Assessment indicates:: Adequate understanding  Deffered due to:: Other (comment) (previously completed, there has been no change and patient has adequate understanding)  Area of need?: No      During today's follow-up visit,  the following areas required further assessment and content was provided/reviewed.    Based on today's diabetes care assessment, the following areas of need were identified:          4/5/2024    12:01 AM   Social   Support No   Access to Mass Media/Tech No   Cognitive/Behavioral Health No   Culture/Judaism No   Communication No   Health Literacy No            4/5/2024    12:01 AM   Clinical   Medication Adherence No   Lab Compliance No   Nutritional Status No            4/5/2024    12:01 AM   Diabetes Self-Management Skills   Diabetes Disease Process/Treatment Options No   Nutrition/Healthy Eating No   Physical Activity/Exercise No   Medication Yes, Insulin Pump Education  OMNI POD 5 INSULIN PUMP START  Explained  SmartAdjust Technology - Every 5 minutes, the system automatically increases, decreases, or pauses insulin delivery based on your customized target glucose--helping to protect against highs and lows, day and night.  Automated Mode vs Manual Mode vs Limited Automated Mode  Downloading the Jyoti and ProConnect ([facility])  Pod and Dexcom need to be in line of site of each other  Inputting Dexcom Transmitter Code into Jyoti or Handheld Device  Dexcom communicates with the pod directly and the Dexcom G6 jyoti  Activity Feature  Changing the target and the length of time insulin is on board will adjust automated mode  Changing ICR/Basal Rates/ISF will only change setting in manual mode  SmartBolus Calculator - incorporates CGM data and trend data  Setup podder central account and linked Glooko account  Patient educated on insulin pump therapy, what a basal rate and bolus dose are, when to change pod, demonstrated how to prepare the syringe and pod for use with the pump, discussed ease of usage, basal and bolus, carbohydrate to insulin ratio, correction factors, approved areas to insert set, carbohydrate counting, error messages, explained the basal rates - patient will receive a little bit of insulin throughout 24 hours, bolus dose are delivered delivered by the inputting grams of carbohydrates, explained that patient will be counting carbohydrates and checking blood glucose before each meal, discussed when to change the pod, demonstrated how to fill the pod with insulin, how to apply pod and insert the needle, explained and demonstrated how to safely retract the needle and remove the pod, discussed ease of usage, carbohydrate counting, demonstrated applying device, inserting needle, administering bolus insulin, retracting needle, and removing/disposing of pod. Patient states understanding and performed return demonstration. Patient started first pod in office. Patient provided with written literature and CDE contact  information      Pump training was provided per Omni Pod protocol.  Patient is new to insulin pump therapy.      All settings obtained from Yunileslie Espinoza's last office visit note.   Basal:  12AM: 0.85 units/hr     Max basal rate: 5 u/hr     Bolus:  CHO ratio: 1:10 (set dose of 60-80 grams of carbohydrates to provide 6-8 units)  ISF: 1:35  Glucose target : 120 mg/dL  Correct  over: 120mg/dl  Active insulin time: 3.0 hours (using Fiasp)  Max bolus: 30 units     Low reservoir insulin alarm: 10 units  Change pod alarm: every 3 days      Details of pump therapy were covered included following controller features and programming, pod activation, pod site selection and rotation, automatic pod priming and insertion, setting & editing basal rates in manual mode, giving bolus and other features in the set-up menu.  The following regarding the Omnipod 5 was covered:  During your first Pod wear, since no recent history is available, the Omnipod 5 System uses only your active Basal Program from Manual Mode as a starting point to adjust your insulin. After 48 hours of history is collected, which usually happens with the first Pod wear, [employer] technology stops adjusting insulin against your active Basal Program and starts using the Adaptive Basal Rate for your automated insulin delivery with your next Pod change. With each Pod change, insulin delivery information is sent and saved in the Omnipod 5 Jyoti so that the next Pod that is started is updated with the new Adaptive Basal Rate. With each new Pod activation, the system adapts insulin delivery based on physiological needs and total daily insulin (TDI) delivered. After 2-3 Pod changes, adaptation generally stabilizes and automated insulin delivery is based on this adaptation.  Patient demonstrated ability to program controller, activate and insert pod using aseptic technique.  Patient demonstrated ability to program Dexcom transmitter into controller and start automated  limited mode.    Instructed pt on use of basic pump features ie...give a bolus, pause insulin, switch from manual to automated mode.  Reviewed features available in manual mode verses automated mode.   Reviewed when and how to use activity function in automated mode.  Reviewed site selection of pods, rotation of sites and hard stop to change pod every 72 hrs.   Instructed that insulin vial is good out of refrigeration for up to 28-30 days.   Reviewed treatment of hypoglycemia, hyperglycemia; sick day care, DKA, and troubleshooting of pump.  Advised to carry back-up supplies with her and discussed steps to take in case of connection loss.   Omni Pod 24-hour support line provided.       Podder username: [email or web address]  Podder password: [email or web address]     Glooko username: [email or web address]        Home Blood Glucose Monitoring No   Acute Complications No   Chronic Complications No   Psychosocial/Coping No        Today's interventions were provided through individual discussion, instruction, and written materials were provided.    Patient verbalized understanding of instruction and written materials.  Pt was able to return back demonstration of instructions today. Patient understood key points, needs reinforcement and further instruction.     Diabetes Self-Management Care Plan Review and Evaluation of Progress:    During today's follow-up Skip's Diabetes Self-Management Care Plan progress was reviewed and progress was evaluated including his/her input. Skip has agreed to continue his/her journey to improve/maintain overall diabetes control by continuing to set health goals. See care plan progress below.      Care Plan: Diabetes Management   Updates made since 3/6/2024 12:00 AM        Problem: Healthy Eating         Long-Range Goal: Eat 3 meals daily with 30-45g/2-3 servings of Carbohydrate per meal.    Start Date: 3/3/2021   This Visit's Progress: On track   Recent Progress: On track    Priority: Medium   Barriers: No Barriers Identified   Note:    Plan is to review carb counting, portion control, importance of spacing meals throughout the day to prevent post prandial elevations.  Recommended low saturated fat, low sodium diet to aid in control of hypertension and cholesterol. Not completed 3/3 due to time       Task: Reviewed the sources and role of Carbohydrate, Protein, and Fat and how each nutrient impacts blood sugar. Completed 3/18/2024        Task: Explained how to count carbohydrates using the food label and the use of dry measuring cups for accurate carb counting. Completed 4/5/2024        Problem: Medications    Note:    Plan to switch to the omnipod 5 from the VGO       Problem: Acute Complications Resolved 3/14/2024        Problem: Chronic Complications Resolved 4/5/2024        Goal: Patient agrees to have the following diabetes jessica maintenance screenings/appointments eye exam completed in the next 6 months. Completed 4/5/2024   Start Date: 3/3/2021   This Visit's Progress: Met   Recent Progress: On track   Priority: Low   Barriers: No Barriers Identified   Note:    ABC's of Diabetes   Discussed imporance of A1c less than 7 to reduce risk of micro and macro complications, controlled Blood Pressure 130/80 and Cholesterol Lab Values--Total Cholesterol <200mg, LDL < 100, HDL >45 men and >50 women, Triglycerides <150mg for prevention heart disease, heart attach, stroke. Diabetes Care Schedule   A1c every 3 to 6 months  Lipid Panel every year  Microalbumin (urine test) once per year  Comprehensive Foot Exam once per year  Dilated Eye Exam at least once per year  Dental exam every 6 months  Flu Vaccination yearly   Shingles and Phneumoia Vaccination as recommended by physician         Task: Discussed importance of annual microalbumin urine test to monitor kidney function. Completed 3/18/2024        Task: Discussed importance of annual dilated eye exam for prevention of retinopathy.  Completed 3/18/2024        Task: Discussed the importance of routine dental exams for the prevention of gum disease and gingivitis and encouraged dental exam every 6 months. Completed 3/18/2024        Task: Instructed on basic daily foot care and encouraged inspecting feet daily. Completed 3/18/2024        Task: Discussed importance of the Flu and Pneumonia Vaccination. Completed 3/18/2024          Follow Up Plan     Follow up in about 1 week (around 4/12/2024) for Insulin Pump Upload, Personal CGM Upload.    Today's care plan and follow up schedule was discussed with patient.  Skye verbalized understanding of the care plan, goals, and agrees to follow up plan.        The patient was encouraged to communicate with his/her health care provider/physician and care team regarding his/her condition(s) and treatment.  I provided the patient with my contact information today and encouraged to contact me via phone or [facility]'s Patient Portal as needed.     Length of Visit   Total Time: 65 Minutes

## 2024-05-03 ENCOUNTER — NUTRITION (OUTPATIENT)
Dept: DIABETES | Facility: CLINIC | Age: 74
End: 2024-05-03
Payer: MEDICARE

## 2024-05-03 DIAGNOSIS — Z79.4 TYPE 2 DIABETES MELLITUS WITH HYPERGLYCEMIA, WITH LONG-TERM CURRENT USE OF INSULIN: ICD-10-CM

## 2024-05-03 DIAGNOSIS — E11.65 TYPE 2 DIABETES MELLITUS WITH HYPERGLYCEMIA, WITH LONG-TERM CURRENT USE OF INSULIN: ICD-10-CM

## 2024-05-03 PROCEDURE — 99212 OFFICE O/P EST SF 10 MIN: CPT | Mod: PBBFAC,PN | Performed by: DIETITIAN, REGISTERED

## 2024-05-03 PROCEDURE — 99999PBSHW PR PBB SHADOW TECHNICAL ONLY FILED TO HB: Mod: PBBFAC,,,

## 2024-05-03 PROCEDURE — 99999 PR PBB SHADOW E&M-EST. PATIENT-LVL II: CPT | Mod: PBBFAC,,, | Performed by: DIETITIAN, REGISTERED

## 2024-05-03 PROCEDURE — G0108 DIAB MANAGE TRN  PER INDIV: HCPCS | Mod: PBBFAC,PN | Performed by: DIETITIAN, REGISTERED

## 2024-05-08 ENCOUNTER — TELEPHONE (OUTPATIENT)
Dept: DIABETES | Facility: CLINIC | Age: 74
End: 2024-05-08

## 2024-05-08 ENCOUNTER — PATIENT OUTREACH (OUTPATIENT)
Dept: DIABETES | Facility: CLINIC | Age: 74
End: 2024-05-08
Payer: MEDICARE

## 2024-05-08 DIAGNOSIS — Z96.41 INSULIN PUMP IN PLACE: ICD-10-CM

## 2024-05-08 DIAGNOSIS — Z79.4 TYPE 2 DIABETES MELLITUS WITH HYPERGLYCEMIA, WITH LONG-TERM CURRENT USE OF INSULIN: Primary | ICD-10-CM

## 2024-05-08 DIAGNOSIS — Z46.81 INSULIN PUMP FITTING OR ADJUSTMENT: ICD-10-CM

## 2024-05-08 DIAGNOSIS — E11.65 TYPE 2 DIABETES MELLITUS WITH HYPERGLYCEMIA, WITH LONG-TERM CURRENT USE OF INSULIN: Primary | ICD-10-CM

## 2024-05-08 PROCEDURE — 95251 CONT GLUC MNTR ANALYSIS I&R: CPT | Mod: ,,, | Performed by: NURSE PRACTITIONER

## 2024-05-08 NOTE — TELEPHONE ENCOUNTER
Spoke to pt informed pt to increase CHO entry to 50 grams of carbs.   Follow up as scheduled in June , pt verbalized understanding

## 2024-05-08 NOTE — PROGRESS NOTES
Continuous Glucose Sensor Report of Personal Device    Skip Curran is a 73 y.o.female with type 2 diabetes     Interpretation of data from Dexcom G6-- 4/20/2024-5/3/2024      Reason for review: Currently on anti-hyperglycemic therapy and failing to achieve glycemic goals.    Lab Results   Component Value Date    HGBA1C 6.0 (H) 04/05/2024         Current diabetes medications and doses:   Continue  Metformin  mg 2 times per day       Mounjaro to 12.5  mg weekly       Omnipod 5 with Fiasp   See setting below   ________________________________________________________________    SUMMARY of KEY FINDINGS:      Average glucose: 157  Above 180 mg/dL: 21%  (Above 250 mg/dL: 0%)  Within  mg/dL: 78%  Below 70 mg/dL: 1%  (Below 54 mg/dL: 0%)      CGM data reviewed and notable for the following trends: having some post prandial excursions       Will make the following changes based on review of data:    Increasing her CHO input to 50 grams of carbohydrates to help with prandial excursions       Yuni Espinoza NP

## 2024-05-10 ENCOUNTER — OFFICE VISIT (OUTPATIENT)
Dept: OPTOMETRY | Facility: CLINIC | Age: 74
End: 2024-05-10
Payer: MEDICARE

## 2024-05-10 DIAGNOSIS — H52.4 PRESBYOPIA: ICD-10-CM

## 2024-05-10 DIAGNOSIS — Z13.5 GLAUCOMA SCREENING: ICD-10-CM

## 2024-05-10 DIAGNOSIS — E11.9 TYPE 2 DIABETES MELLITUS WITHOUT RETINOPATHY: Primary | ICD-10-CM

## 2024-05-10 PROCEDURE — 92014 COMPRE OPH EXAM EST PT 1/>: CPT | Mod: S$PBB,,, | Performed by: OPTOMETRIST

## 2024-05-10 PROCEDURE — 99212 OFFICE O/P EST SF 10 MIN: CPT | Mod: PBBFAC,PO | Performed by: OPTOMETRIST

## 2024-05-10 PROCEDURE — 92015 DETERMINE REFRACTIVE STATE: CPT | Mod: ,,, | Performed by: OPTOMETRIST

## 2024-05-10 PROCEDURE — 99999 PR PBB SHADOW E&M-EST. PATIENT-LVL II: CPT | Mod: PBBFAC,,, | Performed by: OPTOMETRIST

## 2024-05-10 NOTE — PROGRESS NOTES
HPI    72 Y/o female is here for routine eye exam with C/o pt say's she notices   here vision is a little more blurry in OS when reading and looking at a   distance.  Pt denies pain and discomfort   No f/f    Eye med: Systane comp. OU PRN   Last edited by Jalen Smith MA on 5/10/2024  9:36 AM.            Assessment /Plan     For exam results, see Encounter Report.    Type 2 diabetes mellitus without retinopathy    Glaucoma screening    Presbyopia      Sp monovision IOL (OS near)--wrote spex Rx for when needed  DM- WITHOUT RETINOPATHY.  Advised yearly DFE    PLAN:    Rtc 1 yr

## 2024-05-13 NOTE — PROGRESS NOTES
Diabetes Care Specialist Follow-up Note  Author: Alisa Daley RD, CDE  Date: 5/13/2024    Program Intake  Reason for Diabetes Program Visit:: Intervention  Type of Intervention:: Individual  Individual: Device Training  Device Training: Personal CGM  Current diabetes risk level:: low  In the last 12 months, have you:: none  Permission to speak with others about care:: no  Continuous Glucose Monitoring  Patient has CGM: Yes  Personal CGM type:: Dexcom  GMI Date: 03/21/24  GMI Value: 6.4 %    Lab Results   Component Value Date    LABA1C 7.7 (H) 02/28/2018    HGBA1C 6.0 (H) 04/05/2024     A1c Pre Diabetes Care Specialist Intervention:  7.7%    Clinical  Patient Health Rating  Compared to other people your age, how would you rate your health?: Good    Problem Review  Reviewed Problem List with Patient: yes  Active comorbidities affecting diabetes self-care.: no  Reviewed health maintenance: yes    Clinical Assessment  Current Diabetes Treatment: Oral Medication, Injectable, Insulin pump, Insulin  Have you ever experienced hypoglycemia (low blood sugar)?: no  Have you ever experienced hyperglycemia (high blood sugar)?: no    Medication Information  Medication adherence impacting ability to self-manage diabetes?: No    Labs  Lab Compliance Barriers: No    Nutritional Status  Diet: Regular  Meal Plan 24 Hour Recall: Breakfast, Lunch, Dinner, Snack  Meal Plan 24 Hour Recall - Breakfast: toast or bagel and coffee- with sweet and low and SF creamer  Meal Plan 24 Hour Recall - Lunch: sister in law cooks every day  Meal Plan 24 Hour Recall - Dinner: home cooked  Meal Plan 24 Hour Recall - Snack: peanuts and candy sometimes  Change in appetite?: No  Dentation:: Intact  Recent Changes in Weight: No Recent Weight Change  Current nutritional status an area of need that is impacting patient's ability to self-manage diabetes?: No    Physical activity/Exercise:   No change    SMBG: dexcom            Additional Social  History    Support  Does anyone support you with your diabetes care?: yes  Is Support an area impacting ability to self-manage diabetes?: No    Access to Mass Media & Technology  Media or technology needs impacting ability to self-manage diabetes?: No    Cognitive/Behavioral Health  Cognitive or behavioral barriers impacting ability to self-manage diabetes?: No    Culture/Episcopal  Culture or Church beliefs that may impact ability to access healthcare: No    Communication  Communication needs impacting ability to self-manage diabetes?: No    Health Literacy  Health literacy needs impacting ability to self-manage diabetes?: No      Diabetes Self-Management Skills Assessment     Diabetes Disease Process/Treatment Options  Diabetes Disease Process/Treatment Options: Skills Assessment Completed: No  Assessment indicates:: Adequate understanding  Deferred due to:: Other (comment) (previously completed, there has been no change and patient has adequate understanding)  Area of need?: No    Nutrition/Healthy Eating  Nutrition/Healthy Eating Skills Assessment Completed:: No  Assessment indicates:: Adequate understanding  Deffered due to:: Other (comment) (previously completed, there has been no change and patient has adequate understanding)  Area of need?: No    Physical Activity/Exercise  Physical Activity/Exercise Skills Assessment Completed: : No  Assessment indicates:: Adequate understanding  Deffered due to:: Other (comment) (previously completed, there has been no change and patient has adequate understanding)  Area of need?: No    Medications  Patient is able to describe current diabetes management routine.: yes  Diabetes management routine:: insulin, oral medications, injectable medications  Patient is able to identify current diabetes medications, dosages, and appropriate timing of medications.: yes  Patient understands the purpose of the medications taken for diabetes.: yes  Patient reports problems or concerns  with current medication regimen.: yes  Medication regimen problems/concerns:: other (see comments) (will need instruction on the omnipod 5)  Medication Skills Assessment Completed:: Yes  Assessment indicates:: Instruction Needed, Adequate understanding  Area of need?: Yes    Home Blood Glucose Monitoring  Patient states that blood sugar is checked at home daily.: yes  Monitoring Method:: personal continuous glucose monitor  Personal CGM type:: Dexcom  Patient is able to use personal CGM appropriately.: yes  CGM Report reviewed?: yes  Unable to download personal CGM: patient was logged out and now glitching of Dexcom ana  Home Blood Glucose Monitoring Skills Assessment Completed: : Yes  Assessment indicates:: Adequate understanding, Other (comment)  Deferred due to:: Other (comment)  Area of need?: Yes    Acute Complications  Acute Complications Skills Assessment Completed: : No  Assessment indicates:: Adequate understanding  Deffered due to:: Other (comment) (previously completed, there has been no change and patient has adequate understanding)  Area of need?: No    Chronic Complications  Chronic Complications Skills Assessment Completed: : No  Assessment indicates:: Adequate understanding  Deferred due to:: Other (comment) (previously completed, there has been no change and patient has adequate understanding)  Area of need?: No    Psychosocial/Coping  Psychosocial/Coping Skills Assessment Completed: : No  Assessment indicates:: Adequate understanding  Deffered due to:: Other (comment) (previously completed, there has been no change and patient has adequate understanding)  Area of need?: No      During today's follow-up visit,  the following areas required further assessment and content was provided/reviewed.    Based on today's diabetes care assessment, the following areas of need were identified:          5/3/2024    12:01 AM   Social   Support No   Access to Mass Media/Tech No   Cognitive/Behavioral Health No    Culture/[employer] No   Communication No   Health Literacy No            5/3/2024    12:01 AM   Clinical   Medication Adherence No   Lab Compliance No   Nutritional Status No            5/3/2024    12:01 AM   Diabetes Self-Management Skills   Diabetes Disease Process/Treatment Options No   Nutrition/Healthy Eating No   Physical Activity/Exercise No   Medication Yes, reviewed download with patient, still experiencing some skin irritation    Home Blood Glucose Monitoring Yes, re-installed dexcom on patient's phone   Acute Complications No   Chronic Complications No   Psychosocial/Coping No        Today's interventions were provided through individual discussion, instruction, and written materials were provided.    Patient verbalized understanding of instruction and written materials.  Pt was able to return back demonstration of instructions today. Patient understood key points, needs reinforcement and further instruction.     Diabetes Self-Management Care Plan Review and Evaluation of Progress:    During today's follow-up Skip's Diabetes Self-Management Care Plan progress was reviewed and progress was evaluated including his/her input. Skip has agreed to continue his/her journey to improve/maintain overall diabetes control by continuing to set health goals. See care plan progress below.      Care Plan: Diabetes Management   Updates made since 4/13/2024 12:00 AM        Problem: Healthy Eating         Long-Range Goal: Eat 3 meals daily with 30-45g/2-3 servings of Carbohydrate per meal.    Start Date: 3/3/2021   This Visit's Progress: On track   Recent Progress: On track   Priority: Medium   Barriers: No Barriers Identified   Note:    Plan is to review carb counting, portion control, importance of spacing meals throughout the day to prevent post prandial elevations.  Recommended low saturated fat, low sodium diet to aid in control of hypertension and cholesterol. Not completed 3/3 due to time       Task: Provided  visual examples using dry measuring cups, food models, and other familiar objects such as computer mouse, deck or cards, tennis ball etc. to help with visualization of portions. Completed 5/13/2024        Task: Discussed strategies for choosing healthier menu options when dining out. Completed 5/13/2024        Task: Review the importance of balancing carbohydrates with each meal using portion control techniques to count servings of carbohydrate and label reading to identify serving size and amount of total carbs per serving. Completed 5/13/2024        Task: Provided Sample plate method and reviewed the use of the plate to estimate amounts of carbohydrate per meal. Completed 5/13/2024          Follow Up Plan     Follow up in about 3 months (around 8/3/2024) for Insulin Pump Upload, Personal CGM Upload.    Today's care plan and follow up schedule was discussed with patient.  Skip verbalized understanding of the care plan, goals, and agrees to follow up plan.        The patient was encouraged to communicate with his/her health care provider/physician and care team regarding his/her condition(s) and treatment.  I provided the patient with my contact information today and encouraged to contact me via phone or [facility] Patient Portal as needed.     Length of Visit   Total Time: 35 Minutes

## 2024-06-07 ENCOUNTER — TELEPHONE (OUTPATIENT)
Dept: DIABETES | Facility: CLINIC | Age: 74
End: 2024-06-07
Payer: MEDICARE

## 2024-06-10 ENCOUNTER — OFFICE VISIT (OUTPATIENT)
Dept: DIABETES | Facility: CLINIC | Age: 74
End: 2024-06-10
Payer: MEDICARE

## 2024-06-10 VITALS
HEART RATE: 90 BPM | SYSTOLIC BLOOD PRESSURE: 130 MMHG | DIASTOLIC BLOOD PRESSURE: 80 MMHG | BODY MASS INDEX: 33.82 KG/M2 | OXYGEN SATURATION: 96 % | HEIGHT: 61 IN | WEIGHT: 179.13 LBS

## 2024-06-10 DIAGNOSIS — E11.65 TYPE 2 DIABETES MELLITUS WITH HYPERGLYCEMIA, WITH LONG-TERM CURRENT USE OF INSULIN: Primary | ICD-10-CM

## 2024-06-10 DIAGNOSIS — L30.9 DERMATITIS: ICD-10-CM

## 2024-06-10 DIAGNOSIS — Z79.4 TYPE 2 DIABETES MELLITUS WITH HYPERGLYCEMIA, WITH LONG-TERM CURRENT USE OF INSULIN: Primary | ICD-10-CM

## 2024-06-10 DIAGNOSIS — E78.5 DYSLIPIDEMIA, GOAL LDL BELOW 100: ICD-10-CM

## 2024-06-10 DIAGNOSIS — I10 PRIMARY HYPERTENSION: ICD-10-CM

## 2024-06-10 DIAGNOSIS — Z71.9 HEALTH EDUCATION/COUNSELING: ICD-10-CM

## 2024-06-10 DIAGNOSIS — Z46.81 INSULIN PUMP FITTING OR ADJUSTMENT: ICD-10-CM

## 2024-06-10 DIAGNOSIS — Z96.41 INSULIN PUMP IN PLACE: ICD-10-CM

## 2024-06-10 DIAGNOSIS — E66.09 CLASS 1 OBESITY DUE TO EXCESS CALORIES WITH SERIOUS COMORBIDITY AND BODY MASS INDEX (BMI) OF 33.0 TO 33.9 IN ADULT: ICD-10-CM

## 2024-06-10 PROCEDURE — 99999 PR PBB SHADOW E&M-EST. PATIENT-LVL V: CPT | Mod: PBBFAC,,, | Performed by: NURSE PRACTITIONER

## 2024-06-10 PROCEDURE — 99215 OFFICE O/P EST HI 40 MIN: CPT | Mod: PBBFAC,PN | Performed by: NURSE PRACTITIONER

## 2024-06-10 PROCEDURE — 99214 OFFICE O/P EST MOD 30 MIN: CPT | Mod: S$PBB,,, | Performed by: NURSE PRACTITIONER

## 2024-06-10 PROCEDURE — 95251 CONT GLUC MNTR ANALYSIS I&R: CPT | Mod: ,,, | Performed by: NURSE PRACTITIONER

## 2024-06-10 RX ORDER — INSULIN PMP CART,AUT,G6/7,CNTR
1 EACH SUBCUTANEOUS
Qty: 3 EACH | Refills: 11 | Status: SHIPPED | OUTPATIENT
Start: 2024-06-10

## 2024-06-10 RX ORDER — INSULIN PMP CART,AUT,G6/7,CNTR
1 EACH SUBCUTANEOUS
Qty: 2 EACH | Refills: 11 | Status: SHIPPED | OUTPATIENT
Start: 2024-06-10 | End: 2024-06-10 | Stop reason: SDUPTHER

## 2024-06-10 RX ORDER — BLOOD-GLUCOSE TRANSMITTER
1 EACH MISCELLANEOUS
Qty: 1 EACH | Refills: 4 | Status: SHIPPED | OUTPATIENT
Start: 2024-06-10 | End: 2025-06-10

## 2024-06-10 RX ORDER — INSULIN GLARGINE 100 [IU]/ML
24 INJECTION, SOLUTION SUBCUTANEOUS DAILY
Qty: 15 ML | Refills: 6 | Status: SHIPPED | OUTPATIENT
Start: 2024-06-10 | End: 2024-06-10 | Stop reason: SDUPTHER

## 2024-06-10 RX ORDER — INSULIN ASPART INJECTION 100 [IU]/ML
INJECTION, SOLUTION SUBCUTANEOUS
Qty: 30 ML | Refills: 6 | Status: SHIPPED | OUTPATIENT
Start: 2024-06-10

## 2024-06-10 RX ORDER — METFORMIN HYDROCHLORIDE 500 MG/1
500 TABLET, EXTENDED RELEASE ORAL 2 TIMES DAILY WITH MEALS
Qty: 180 TABLET | Refills: 2 | Status: SHIPPED | OUTPATIENT
Start: 2024-06-10 | End: 2024-06-10 | Stop reason: SDUPTHER

## 2024-06-10 RX ORDER — TRIAMCINOLONE ACETONIDE 1 MG/G
CREAM TOPICAL 2 TIMES DAILY
Qty: 80 G | Refills: 2 | Status: SHIPPED | OUTPATIENT
Start: 2024-06-10

## 2024-06-10 RX ORDER — BLOOD-GLUCOSE SENSOR
1 EACH MISCELLANEOUS
Qty: 3 EACH | Refills: 11 | Status: SHIPPED | OUTPATIENT
Start: 2024-06-10 | End: 2025-06-10

## 2024-06-10 RX ORDER — PEN NEEDLE, DIABETIC 32GX 5/32"
NEEDLE, DISPOSABLE MISCELLANEOUS
Qty: 30 EACH | Refills: 11 | Status: SHIPPED | OUTPATIENT
Start: 2024-06-10

## 2024-06-10 NOTE — PROGRESS NOTES
CC:   Chief Complaint   Patient presents with    Diabetes Mellitus       HPI: Skip Curran is a 73 y.o. female presents for a follow up visit today for the management of T2DM.     She was diagnosed with Type 2 diabetes around 6135-2102 on routine lab work. She was initially started on Metformin and then later started insulin therapy around 2010.     Family hx of diabetes: father, and maternal grandmother   Hospitalized for diabetes: denies     No personal or personal of pancreatic cancer or pancreatitis.   + sister with thyroid cancer - unknown the kind of thyroid cancer        Our last visit was in January of 2024   At that visit we continued her metformin twice a day   We continued her V Go   We discussed the Omnipod 5 and she would think about it   We increased her Mounjaro to 10 mg weekly   We continued her Dexcom G6   She met with diabetes education in March of 2024 and decided to change over to the Omnipod 5   She started the Omnipod 5 in April of 2024   See attached downloads  Denies GI upset with the mounjaro   Reports a rash at her injection site with the mounjaro   A1c at 6.6% up from 6%   Had some Omnipod and dexcom issues and her sugars were in the 200's                         DIABETES COMPLICATIONS: none      Diabetes Management Status    ASA:  No-- allergy to ASA- Hives     Statin: Taking- Crestor 20 mg nightly   ACE/ARB: Taking- Lisinopril 10 mg daily     The ASCVD Risk score ([place] MAGALLON, et al., 2019) failed to calculate for the following reasons:    The valid total cholesterol range is 130 to 320 mg/dL      Screening or Prevention Patient's value Goal Complete/Controlled?   HgA1C Testing and Control   Lab Results   Component Value Date    HGBA1C 6.6 (H) 06/04/2024      Annually/Less than 8% Yes   Lipid profile : 06/04/2024 Annually Yes   LDL control Lab Results   Component Value Date    LDLCALC 44.4 (L) 06/04/2024    Annually/Less than 100 mg/dl  Yes   Nephropathy screening Lab Results    Component Value Date    LABMICR 10.0 01/18/2024     Lab Results   Component Value Date    PROTEINUA Negative 11/21/2022    Annually No   Blood pressure BP Readings from Last 1 Encounters:   06/10/24 130/80    Less than 140/90 Yes   Dilated retinal exam : 05/10/2024- external- across the [place] [facility]- at [facility]- number [phone] Annually No   Foot exam   : 09/22/2023 Annually No       CURRENT A1C:    Hemoglobin A1C   Date Value Ref Range Status   06/04/2024 6.6 (H) 4.0 - 5.6 % Final     Comment:     ADA Screening Guidelines:  5.7-6.4%  Consistent with prediabetes  >or=6.5%  Consistent with diabetes    High levels of fetal hemoglobin interfere with the HbA1C  assay. Heterozygous hemoglobin variants (HbS, HgC, etc)do  not significantly interfere with this assay.   However, presence of multiple variants may affect accuracy.     04/05/2024 6.0 (H) 4.0 - 5.6 % Final     Comment:     ADA Screening Guidelines:  5.7-6.4%  Consistent with prediabetes  >or=6.5%  Consistent with diabetes    High levels of fetal hemoglobin interfere with the HbA1C  assay. Heterozygous hemoglobin variants (HbS, HgC, etc)do  not significantly interfere with this assay.   However, presence of multiple variants may affect accuracy.     01/18/2024 6.0 (H) 4.0 - 5.6 % Final     Comment:     ADA Screening Guidelines:  5.7-6.4%  Consistent with prediabetes  >or=6.5%  Consistent with diabetes    High levels of fetal hemoglobin interfere with the HbA1C  assay. Heterozygous hemoglobin variants (HbS, HgC, etc)do  not significantly interfere with this assay.   However, presence of multiple variants may affect accuracy.         GOAL A1C: 6.5%-7% without hypoglycemia       DM MEDICATIONS USED IN THE PAST:  Metformin   Lantus, Janumet  Novolog 70/30  VGo  Dexcom   Metformin XR  Novolog   Humalog   Ozempic   Fiasp   Mounjaro   Omnipod 5       CURRENT DIABETES MEDICATIONS: Metformin  mg BID  Mounjaro 12.5  mg weekly on  Thursdays     Insulin Pump: Omnipod 5 with Fiasp     Pump settings:  Basal:0.85 units/hr   ICR:1:10-- 4-5 units with meals- mostly 5 units   ISF: 1:35   Target: 110   IOB: 2.5 hours       Pump site change: q 3 days   Cartridge change: q 3 days  Insulin TDD: 27.5 units    Basal 65 %   Bolus 35 %   100% in auto mode       Back up Lantus/Levemir: denies     Patient's pump was downloaded in clinic today and reviewed with patient.   Please see attached documents for pump download.       Insulin:  Omnipod   Missed doses: denies         VGo from [employer].         BLOOD GLUCOSE MONITORING:   Sensor type: Dexcom G6   Site change:   q10 days  Average BG is 162  Estimated A1c is 7.2%   76% in range   20% high   <1% low   <1% very low     2 weeks prior - BG was 146  88%in range   6.8% estimated A1c     Dexcom supplies from pharmacy now         HYPOGLYCEMIA: rarely    Glucagon kit: denies - lives alone   Medic alert bracelet: denies   Drinks OJ       MEALS: eating 3 meals per day   BF: toast or bagel and coffee- with sweet and low and SF creamer   Lunch: sister in [employer] cooks every day   Dinner: home cooked as well.   Snack: occ on peanut, occ candy   Drinks: water or diet drinks        CURRENT EXERCISE:  No    Review of Systems  Review of Systems   Constitutional:  Negative for appetite change and fatigue.   HENT:  Negative for trouble swallowing.    Eyes:  Negative for visual disturbance.   Respiratory:  Negative for shortness of breath.    Cardiovascular:  Negative for chest pain.   Gastrointestinal:  Negative for nausea.   Endocrine: Negative for polydipsia, polyphagia and polyuria.   Genitourinary:         No Nocturia    Musculoskeletal:  Positive for arthralgias (right hip), back pain and joint swelling.        Right knee has improved with CBD capsule    Skin:  Positive for rash (rash following mounjaro injections). Negative for wound.   Neurological:  Negative for numbness.       Physical Exam   Physical  Exam  Vitals and nursing note reviewed.   Constitutional:       Appearance: She is well-developed. She is obese.   HENT:      Head: Normocephalic and atraumatic.      Right Ear: External ear normal.      Left Ear: External ear normal.      Nose: Nose normal.   Neck:      Thyroid: No thyromegaly.      Trachea: No tracheal deviation.   Cardiovascular:      Rate and Rhythm: Normal rate and regular rhythm.      Heart sounds: No murmur heard.  Pulmonary:      Effort: Pulmonary effort is normal. No respiratory distress.      Breath sounds: Normal breath sounds.   Abdominal:      Palpations: Abdomen is soft.      Tenderness: There is no abdominal tenderness.      Hernia: No hernia is present.   Musculoskeletal:      Cervical back: Normal range of motion and neck supple.   Skin:     General: Skin is warm and dry.      Capillary Refill: Capillary refill takes less than 2 seconds.      Findings: No rash.      Comments: Omnipod 5 and Dexcom sites are normal appearing. No lipo hypertropthy or atrophy  Skin irritation from mounjaro site- skin dermatitis    Neurological:      Mental Status: She is alert and oriented to person, place, and time.      Cranial Nerves: No cranial nerve deficit.   Psychiatric:         Behavior: Behavior normal.         Judgment: Judgment normal.         FOOT EXAMINATION: Appropriate footwear         Lab Results   Component Value Date    TSH 1.124 04/05/2024         Type 2 diabetes mellitus with hyperglycemia, with long-term current use of insulin  Controlled   She is frustrated with weight  She like the omnipod   Discussed pump optimization and troubleshooting   Reviewed back up plan       -- Medication Changes:   Continue  Metformin  mg 2 times per day      Increase Mounjaro 15 mg weekly -- she let me know if the rash continues-- we will change her back to Ozempic -- 1 mg weekly     Continue Omnipod 5   Cut back basal based on download to 0.8 units/hr     Continue set doses with meals- 4-5 units        -- Reviewed goals of therapy are to get the best control we can without hypoglycemia.  -- Reviewed patient's current insulin regimen. Clarified proper insulin dose and timing in relation to meals, etc. Insulin injection sites and proper rotation instructed.    -- Advised frequent self blood glucose monitoring.  Patient encouraged to document glucose results and bring them to every clinic visit. Continue to use Dexcom G6   -- Hypoglycemia precautions discussed. Instructed on precautions before driving.    -- Call for Bg repeatedly < 70 or > 180.   -- Close adherence to lifestyle changes recommended.   -- Periodic follow ups for eye evaluations, foot care and dental care suggested.        Patient has diabetes mellitus and manages diabetes with intensive insulin regimen and uses prandial and basal insulin daily-- Via Omnipod   Patient requires a therapeutic CGM and is willing to use therapeutic CGM for the necessary frequent adjustments of insulin therapy.  I have completed an in-person visit during the previous 6 months and will continue to have in-person visits every 6 months to assess adherence to their CGM regimen and diabetes treatment plan.  Due to COVID pandemic and need for remote monitoring this tool is medically necessary      HTN (hypertension)  BP goal is < 140/90.   Tolerating ACEi  Controlled   Blood pressure goals discussed with patient      Dyslipidemia, goal LDL below 100  On statin per ADA recommendations  LDL goal < 100. LDL at goal. LFTs WNL. Continue statin.         Class 1 obesity due to excess calories with serious comorbidity and body mass index (BMI) of 33.0 to 33.9 in adult  Body mass index is 33.84 kg/m².  Increases insulin resistance.   Discussed DM diet and exercise.       Insulin pump in place  See above     Insulin pump fitting or adjustment  See above           I spent a total of 30 minutes on the day of the visit.This includes face to face time and non-face to face time preparing  to see the patient (eg, review of tests), obtaining and/or reviewing separately obtained history, documenting clinical information in the electronic or other health record, independently interpreting results and communicating results to the patient/family/caregiver, or care coordinator.        Pump backup plan    If the insulin pump is non functional and discontinued for anticipated more than 20 hours, please give daily injections of:   Long acting insulin Lantus 20-24 units daily   Short acting insulin Fiasp 5 units for meals according to carb ratios and sensitivity factor in the pump.     When the insulin pump is restarted, do not restart basal rates until at least 22 hours after the last long acting insulin injection. You can set a 0% temporary basal setting that will last until this time and use your pump to bolus for meals and correction.     For any technical insulin pump issues, please contact the insulin pump company; the toll free number is printed on the label on the back of the insulin pump.       If your sugar is running high for a few hours and does not respond to two correction doses from the insulin pump, it may mean that you have a bad pump site and the site should be changed       Follow up in about 4 months (around 10/10/2024).-   Follow up with me in 4 months with labs prior       Orders Placed This Encounter   Procedures    Hemoglobin A1C     Standing Status:   Future     Standing Expiration Date:   12/10/2025    Basic Metabolic Panel     Standing Status:   Future     Standing Expiration Date:   12/10/2025         Recommendations were discussed with the patient in detail  The patient verbalized understanding and agrees with the plan outlined as above.     This note was partly generated with [employer] voice recognition software. I apologize for any possible typographical errors.

## 2024-06-10 NOTE — ASSESSMENT & PLAN NOTE
Controlled   She is frustrated with weight  She like the omnipod   Discussed pump optimization and troubleshooting   Reviewed back up plan       -- Medication Changes:   Continue  Metformin  mg 2 times per day      Increase Mounjaro 15 mg weekly -- she let me know if the rash continues-- we will change her back to Ozempic -- 1 mg weekly     Continue Omnipod 5   Cut back basal based on download to 0.8 units/hr     Continue set doses with meals- 4-5 units       -- Reviewed goals of therapy are to get the best control we can without hypoglycemia.  -- Reviewed patient's current insulin regimen. Clarified proper insulin dose and timing in relation to meals, etc. Insulin injection sites and proper rotation instructed.    -- Advised frequent self blood glucose monitoring.  Patient encouraged to document glucose results and bring them to every clinic visit. Continue to use Dexcom G6   -- Hypoglycemia precautions discussed. Instructed on precautions before driving.    -- Call for Bg repeatedly < 70 or > 180.   -- Close adherence to lifestyle changes recommended.   -- Periodic follow ups for eye evaluations, foot care and dental care suggested.        Patient has diabetes mellitus and manages diabetes with intensive insulin regimen and uses prandial and basal insulin daily-- Via Omnipod   Patient requires a therapeutic CGM and is willing to use therapeutic CGM for the necessary frequent adjustments of insulin therapy.  I have completed an in-person visit during the previous 6 months and will continue to have in-person visits every 6 months to assess adherence to their CGM regimen and diabetes treatment plan.  Due to COVID pandemic and need for remote monitoring this tool is medically necessary

## 2024-06-10 NOTE — ASSESSMENT & PLAN NOTE
Body mass index is 33.84 kg/m².  Increases insulin resistance.   Discussed DM diet and exercise.

## 2024-08-14 ENCOUNTER — OFFICE VISIT (OUTPATIENT)
Dept: SURGERY | Facility: CLINIC | Age: 74
End: 2024-08-14
Payer: MEDICARE

## 2024-08-14 VITALS — HEART RATE: 79 BPM | SYSTOLIC BLOOD PRESSURE: 107 MMHG | TEMPERATURE: 98 F | DIASTOLIC BLOOD PRESSURE: 70 MMHG

## 2024-08-14 DIAGNOSIS — R10.10 UPPER ABDOMINAL PAIN, UNSPECIFIED: ICD-10-CM

## 2024-08-14 DIAGNOSIS — R10.13 EPIGASTRIC ABDOMINAL PAIN: Primary | ICD-10-CM

## 2024-08-14 PROCEDURE — 99999 PR PBB SHADOW E&M-EST. PATIENT-LVL IV: CPT | Mod: PBBFAC,,, | Performed by: SURGERY

## 2024-08-14 PROCEDURE — 99214 OFFICE O/P EST MOD 30 MIN: CPT | Mod: PBBFAC,PN | Performed by: SURGERY

## 2024-08-14 PROCEDURE — 99204 OFFICE O/P NEW MOD 45 MIN: CPT | Mod: S$PBB,,, | Performed by: SURGERY
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
## 2024-08-26 ENCOUNTER — HOSPITAL ENCOUNTER (OUTPATIENT)
Dept: RADIOLOGY | Facility: HOSPITAL | Age: 74
Discharge: HOME | End: 2024-08-26
Attending: SURGERY
Payer: MEDICARE

## 2024-08-26 DIAGNOSIS — R10.13 EPIGASTRIC ABDOMINAL PAIN: ICD-10-CM

## 2024-08-26 DIAGNOSIS — R10.10 UPPER ABDOMINAL PAIN, UNSPECIFIED: ICD-10-CM

## 2024-08-26 PROCEDURE — 78226 HEPATOBILIARY SYSTEM IMAGING: CPT | Mod: 26,,, | Performed by: RADIOLOGY

## 2024-08-26 PROCEDURE — A9537 TC99M MEBROFENIN: HCPCS | Performed by: SURGERY

## 2024-08-26 PROCEDURE — 76705 ECHO EXAM OF ABDOMEN: CPT | Mod: TC

## 2024-08-26 PROCEDURE — 78226 HEPATOBILIARY SYSTEM IMAGING: CPT | Mod: TC

## 2024-08-26 PROCEDURE — 76705 ECHO EXAM OF ABDOMEN: CPT | Mod: 26,,, | Performed by: RADIOLOGY

## 2024-08-26 RX ORDER — KIT FOR THE PREPARATION OF TECHNETIUM TC 99M MEBROFENIN 45 MG/10ML
8 INJECTION, POWDER, LYOPHILIZED, FOR SOLUTION INTRAVENOUS
Status: COMPLETED | OUTPATIENT
Start: 2024-08-26 | End: 2024-08-26

## 2024-08-26 RX ADMIN — MEBROFENIN 8 MILLICURIE: 45 INJECTION, POWDER, LYOPHILIZED, FOR SOLUTION INTRAVENOUS at 08:08

## 2024-08-27 ENCOUNTER — TELEPHONE (OUTPATIENT)
Dept: SURGERY | Facility: CLINIC | Age: 74
End: 2024-08-27
Payer: MEDICARE

## 2024-08-27 NOTE — TELEPHONE ENCOUNTER
----- Message from Arti Bennett sent at 8/27/2024  2:17 PM CDT -----  Type:  Sooner Apoointment Request    Caller is requesting a sooner appointment.  Caller declined first available appointment listed below.  Caller will not accept being placed on the waitlist and is requesting a message be sent to doctor.  Name of Caller: Pt  When is the first available appointment?  Symptoms: Gallbladder  Would the patient rather a call back or a response via [facility]? Call  Best Call Back Number:  [phone]  Additional Information:

## 2024-08-28 ENCOUNTER — PATIENT MESSAGE (OUTPATIENT)
Dept: DIABETES | Facility: CLINIC | Age: 74
End: 2024-08-28
Payer: MEDICARE

## 2024-08-30 ENCOUNTER — PATIENT MESSAGE (OUTPATIENT)
Dept: SURGERY | Facility: CLINIC | Age: 74
End: 2024-08-30
Payer: MEDICARE

## 2024-09-06 ENCOUNTER — TELEPHONE (OUTPATIENT)
Dept: SURGERY | Facility: CLINIC | Age: 74
End: 2024-09-06
Payer: MEDICARE

## 2024-09-06 DIAGNOSIS — K80.20 GALLSTONES: ICD-10-CM

## 2024-09-06 DIAGNOSIS — K80.20 SYMPTOMATIC CHOLELITHIASIS: Primary | ICD-10-CM

## 2024-09-06 DIAGNOSIS — Z01.818 PRE-OP EVALUATION: Primary | ICD-10-CM

## 2024-09-17 ENCOUNTER — NUTRITION (OUTPATIENT)
Dept: DIABETES | Facility: CLINIC | Age: 74
End: 2024-09-17
Payer: MEDICARE

## 2024-09-17 DIAGNOSIS — E11.65 TYPE 2 DIABETES MELLITUS WITH HYPERGLYCEMIA, WITH LONG-TERM CURRENT USE OF INSULIN: Primary | ICD-10-CM

## 2024-09-17 DIAGNOSIS — Z79.4 TYPE 2 DIABETES MELLITUS WITH HYPERGLYCEMIA, WITH LONG-TERM CURRENT USE OF INSULIN: Primary | ICD-10-CM

## 2024-09-17 PROCEDURE — 99211 OFF/OP EST MAY X REQ PHY/QHP: CPT | Mod: PBBFAC,PN | Performed by: DIETITIAN, REGISTERED

## 2024-09-17 PROCEDURE — 99999PBSHW PR PBB SHADOW TECHNICAL ONLY FILED TO HB: Mod: PBBFAC,,,

## 2024-09-17 PROCEDURE — G0108 DIAB MANAGE TRN  PER INDIV: HCPCS | Mod: PBBFAC,PN | Performed by: DIETITIAN, REGISTERED

## 2024-09-17 PROCEDURE — 99999 PR PBB SHADOW E&M-EST. PATIENT-LVL I: CPT | Mod: PBBFAC,,, | Performed by: DIETITIAN, REGISTERED

## 2024-09-19 ENCOUNTER — HOSPITAL ENCOUNTER (OUTPATIENT)
Dept: PREADMISSION TESTING | Facility: HOSPITAL | Age: 74
Discharge: HOME | End: 2024-09-19
Attending: SURGERY
Payer: MEDICARE

## 2024-09-19 VITALS
DIASTOLIC BLOOD PRESSURE: 76 MMHG | RESPIRATION RATE: 18 BRPM | TEMPERATURE: 98 F | BODY MASS INDEX: 33.76 KG/M2 | WEIGHT: 171.94 LBS | HEART RATE: 69 BPM | OXYGEN SATURATION: 98 % | SYSTOLIC BLOOD PRESSURE: 116 MMHG | HEIGHT: 60 IN

## 2024-09-19 DIAGNOSIS — Z01.818 PRE-OP EVALUATION: ICD-10-CM

## 2024-09-19 PROCEDURE — 93005 ELECTROCARDIOGRAM TRACING: CPT | Performed by: INTERNAL MEDICINE

## 2024-09-19 PROCEDURE — 93010 ELECTROCARDIOGRAM REPORT: CPT | Mod: ,,, | Performed by: INTERNAL MEDICINE

## 2024-09-19 RX ORDER — TRAZODONE HYDROCHLORIDE 150 MG/1
150 TABLET ORAL NIGHTLY
COMMUNITY

## 2024-09-19 RX ORDER — OMEPRAZOLE 40 MG/1
40 CAPSULE, DELAYED RELEASE ORAL DAILY
COMMUNITY

## 2024-09-19 NOTE — PROGRESS NOTES
Diabetes Care Specialist Follow-up Note  Author: Alisa Daley RD, CDE  Date: 9/19/2024    Intake    Program Intake  Reason for Diabetes Program Visit:: Intervention  Type of Intervention:: Individual  Individual: Device Training  Device Training: Personal CGM, Other  Current diabetes risk level:: low  In the last 12 months, have you:: none  Permission to speak with others about care:: no    Current Diabetes Treatment: Insulin, Oral Medications, DM Injectables  Oral Medication Type/Dose: metformin ER 500mg Twice a day  DM Injectables Type/Dose: tirzepatide 15mg/0.5ml once a week  Method of insulin delivery?: Insulin Pump  Type of Pump: omnipod 5  Does patient have back-up plan?: Yes  Any problems obtaining supplies?: No    Continuous Glucose Monitoring  Patient has CGM: Yes  Personal CGM type:: Dexcom  GMI Date: 03/21/24  GMI Value: 6.4 %    Lab Results   Component Value Date    LABA1C 7.7 (H) 02/28/2018    HGBA1C 6.6 (H) 06/04/2024     A1c Pre Diabetes Care Specialist Intervention:  7.7%    Physical activity/Exercise:   No change    SMBG: using the dexcom - not logged in on phone                Lifestyle Coping Support & Clinical    Lifestyle/Coping/Support  Compared to other people your age, how would you rate your health?: Good  Psychosocial/Coping Skills Assessment Completed: : No  Assessment indicates:: Adequate understanding  Deffered due to:: Other (comment) (previously completed, there has been no change and patient has adequate understanding)  Area of need?: No    Problem Review  Active Comorbidities: Other (comment), Hypertension, Hyperlipidemia/Dyslipidemia, Obesity, Gastrointestinal Disorder (Asthma)    Diabetes Self-Management Skills Assessment    Medication Skills Assessment  Patient is able to identify current diabetes medications, dosages, and appropriate timing of medications.: yes  Patient reports problems or concerns with current medication regimen.: yes  Medication regimen problems/concerns::  other (see comments) (will need instruction on the omnipod 5)  Medication Skills Assessment Completed:: Yes  Assessment indicates:: Instruction Needed, Adequate understanding  Area of need?: Yes    Diabetes Disease Process/Treatment Options  Diabetes Type?: Type II  Is patient aware of what causes diabetes?: Yes  Does patient understand the pathophysiology of diabetes?: Yes  Diabetes Disease Process/Treatment Options: Skills Assessment Completed: No  Assessment indicates:: Adequate understanding  Deferred due to:: Other (comment) (previously completed, there has been no change and patient has adequate understanding)  Area of need?: No    Nutrition/Healthy Eating  Meal Plan 24 Hour Recall - Breakfast: toast or bagel and coffee- with sweet and low and SF creamer  Meal Plan 24 Hour Recall - Lunch: sister in law cooks every day  Meal Plan 24 Hour Recall - Dinner: home cooked  Meal Plan 24 Hour Recall - Snack: peanuts and candy sometimes  Meal Plan 24 Hour Recall - Beverage: water and sugar free beverages  Who shops/cooks?: sister-in-law  Patient can identify foods that impact blood sugar.: yes  Challenges to healthy eating:: portion control, snacking between meals and at night  Nutrition/Healthy Eating Skills Assessment Completed:: No  Assessment indicates:: Adequate understanding  Deffered due to:: Other (comment) (previously completed, there has been no change and patient has adequate understanding)  Area of need?: No    Physical Activity/Exercise  Physical Activity/Exercise Skills Assessment Completed: : No  Assessment indicates:: Adequate understanding  Deffered due to:: Other (comment) (previously completed, there has been no change and patient has adequate understanding)  Area of need?: No    Home Blood Glucose Monitoring  Patient states that blood sugar is checked at home daily.: yes  Monitoring Method:: personal continuous glucose monitor  Personal CGM type:: Dexcom   What is your current Time in Range?:  93%  What is your A1c Target?: 7.0  Home Blood Glucose Monitoring Skills Assessment Completed: : Yes  Assessment indicates:: Adequate understanding, Other (comment)  Deferred due to:: Other (comment)  Area of need?: Yes    Acute Complications  Acute Complications Skills Assessment Completed: : No  Assessment indicates:: Adequate understanding  Deffered due to:: Other (comment) (previously completed, there has been no change and patient has adequate understanding)  Area of need?: No    Chronic Complications  Reviewed health maintenance: yes  Chronic Complications Skills Assessment Completed: : No  Assessment indicates:: Adequate understanding  Deferred due to:: Other (comment) (previously completed, there has been no change and patient has adequate understanding)  Area of need?: No      During today's follow-up visit,  the following areas required further assessment and content was provided/reviewed.    Based on today's diabetes care assessment, the following areas of need were identified:          9/17/2024    12:01 AM   Areas of Need   Medications/Current Diabetes Treatment Yes, patient reprogrammed the omnipod 5  while on the phone with omnipod for 3 hours troubleshooting. Patient was still unable to get the dexcom to connect to the omnipod.   At time of visit we reviewed placement site of both omnipod and dexcom - they are in line of sight, patient was logged out of the dexcom ana, so we logged her back in, we compared the transmitter ID in both devices and they are the same, by end of visit dexcom and omnipod were communicating and functioning properly. Patient is scheduled for cholecystectomy on Monday, 9/23/2024, she will follow-up after surgery to re-start the dexcom with the omnipod 5    Lifestyle Coping Support No   Diabetes Disease Process/Treatment Options No   Nutrition/Healthy Eating No   Physical Activity/Exercise No   Home Blood Glucose Monitoring Yes, patient was logged out of the dexcom ana  and needed help logging back in  Comparison of previous CGM data 5/3/2024 to current    Average Glucose 132 improved from 157  Standard Deviation 27 improved from 31  GMI NA from 7.1 %    Time in Range  0% of time patient was in Very High Range no change from 0%  7% of time patient was in High Range improved from 21%  93% of time patient was in Range improved from 78%  0% of time patient was in Low Range no change from 1%  0% of time patient was in Very Low Range no change from 0%     Acute Complications No   Chronic Complications No        Today's interventions were provided through individual discussion, instruction, and written materials were provided.    Patient verbalized understanding of instruction and written materials.  Pt was able to return back demonstration of instructions today. Patient understood key points, needs reinforcement and further instruction.     Diabetes Self-Management Care Plan Review and Evaluation of Progress:    During today's follow-up Bills Diabetes Self-Management Care Plan progress was reviewed and progress was evaluated including his/her input. Skip has agreed to continue his/her journey to improve/maintain overall diabetes control by continuing to set health goals. See care plan progress below.      Care Plan: Diabetes Management   Updates made since 8/20/2024 12:00 AM        Problem: Healthy Eating Resolved 9/17/2024        Long-Range Goal: Eat 3 meals daily with 30-45g/2-3 servings of Carbohydrate per meal. Completed 9/17/2024   Start Date: 3/3/2021   Expected End Date: 9/17/2024   This Visit's Progress: Met   Recent Progress: On track   Priority: Medium   Barriers: No Barriers Identified   Note:    Plan is to review carb counting, portion control, importance of spacing meals throughout the day to prevent post prandial elevations.  Recommended low saturated fat, low sodium diet to aid in control of hypertension and cholesterol. Not completed 3/3 due to time       Problem:  Medications Resolved 9/17/2024   Note:    Plan to switch to the omnipod 5 from the VGO         Follow Up Plan     Follow up in about 1 week (around 9/24/2024) for General Follow-up, Personal CGM Upload, Insulin Pump Upload.    Today's care plan and follow up schedule was discussed with patient.  Zulemayce verbalized understanding of the care plan, goals, and agrees to follow up plan.        The patient was encouraged to communicate with his/her health care provider/physician and care team regarding his/her condition(s) and treatment.  I provided the patient with my contact information today and encouraged to contact me via phone or [facility] Patient Portal as needed.     Length of Visit   Total Time: 35 Minutes

## 2024-09-19 NOTE — DISCHARGE INSTRUCTIONS
To confirm, Your doctor has instructed you that surgery is scheduled for: Monday, September 23, 2024    Pre-Op will call the afternoon prior to surgery between 4:00 and 6:00 PM with the final arrival time on Friday, September 20, 2024    Please report to [facility] [place] via [facility] entrance. Check in at registration desk.    Do not eat or drink anything after midnight the night before your surgery - THIS INCLUDES  WATER, GUM, MINTS AND CANDY.  YOU MAY BRUSH YOUR TEETH BUT DO NOT SWALLOW     TAKE ONLY THESE MEDICATIONS WITH A SMALL SIP OF WATER THE MORNING OF YOUR PROCEDURE:  AMLODIPINE / GABAPENTIN    HOLD LISINOPRIL EVENING BEFORE AND MORNING OF SURGERY      ONLY if you are diabetic, check your sugar in the morning before your procedure.       Do not take any diabetic medicines or insulin the morning of surgery .     PLEASE NOTE:  The surgery schedule has many variables which may affect the time of your surgery case.  Family members should be available if your surgery time changes.  Plan to be here the day of your procedure between 4-6 hours.      DO NOT TAKE THESE MEDICATIONS 5-7 DAYS PRIOR to your procedure or per your surgeon's request: ASPIRIN, ALEVE, ADVIL, IBUPROFEN,  SANTOS SELTZER, BC , FISH OIL , VITAMIN E, HERBALS  (May take Tylenol)                                                      IMPORTANT INSTRUCTIONS      Shower the night before AND the morning of your procedure with a Chlorhexidine wash such as Hibiclens or Dial antibacterial soap from the neck down. Do not apply any deodorants, lotions or powders after each shower.  Do not get it on your face or in your eyes.  You may use your own shampoo and face wash. This helps your skin to be as bacteria free as possible.  DO NOT remove hair from the surgery site.  Do not shave the incision site unless you are given specific instructions to do so.    Sleep in a bed with clean sheets.  Do not sleep with a pet in the bed.    Please leave all jewelry,  piercing's and valuables at home.       Make arrangements in advance for transportation home by a responsible adult.    You must make arrangements for transportation, TAXI'S, UBER'S OR LYFTS ARE NOT ALLOWED.      If you have any questions about these instructions, call Pre-Op Admit  Nursing at [phone] or the Pre-Op Day Surgery Unit at [phone].

## 2024-09-21 LAB
OHS QRS DURATION: 78 MS
OHS QTC CALCULATION: 441 MS

## 2024-09-23 ENCOUNTER — ANESTHESIA EVENT (OUTPATIENT)
Dept: SURGERY | Facility: HOSPITAL | Age: 74
End: 2024-09-23
Payer: MEDICARE

## 2024-09-23 ENCOUNTER — ANESTHESIA (OUTPATIENT)
Dept: SURGERY | Facility: HOSPITAL | Age: 74
End: 2024-09-23
Payer: COMMERCIAL

## 2024-09-23 ENCOUNTER — HOSPITAL ENCOUNTER (OUTPATIENT)
Facility: HOSPITAL | Age: 74
Discharge: HOME | End: 2024-09-23
Attending: SURGERY | Admitting: SURGERY
Payer: MEDICARE

## 2024-09-23 VITALS
BODY MASS INDEX: 33.77 KG/M2 | DIASTOLIC BLOOD PRESSURE: 59 MMHG | SYSTOLIC BLOOD PRESSURE: 109 MMHG | TEMPERATURE: 97 F | HEIGHT: 60 IN | OXYGEN SATURATION: 95 % | RESPIRATION RATE: 16 BRPM | HEART RATE: 72 BPM | WEIGHT: 172 LBS

## 2024-09-23 DIAGNOSIS — K80.20 GALLSTONES: ICD-10-CM

## 2024-09-23 DIAGNOSIS — K80.20 SYMPTOMATIC CHOLELITHIASIS: Primary | ICD-10-CM

## 2024-09-23 LAB — POCT GLUCOSE: 163 MG/DL (ref 70–110)

## 2024-09-23 PROCEDURE — 25000003 PHARM REV CODE 250: Performed by: SURGERY

## 2024-09-23 PROCEDURE — 37000008 HC ANESTHESIA 1ST 15 MINUTES: Performed by: SURGERY

## 2024-09-23 PROCEDURE — 25000003 PHARM REV CODE 250: Performed by: NURSE ANESTHETIST, CERTIFIED REGISTERED

## 2024-09-23 PROCEDURE — 71000033 HC RECOVERY, INTIAL HOUR: Performed by: SURGERY

## 2024-09-23 PROCEDURE — 25000003 PHARM REV CODE 250: Performed by: ANESTHESIOLOGY

## 2024-09-23 PROCEDURE — 36000709 HC OR TIME LEV III EA ADD 15 MIN: Performed by: SURGERY

## 2024-09-23 PROCEDURE — 88304 TISSUE EXAM BY PATHOLOGIST: CPT | Mod: TC | Performed by: PATHOLOGY

## 2024-09-23 PROCEDURE — 63600175 PHARM REV CODE 636 W HCPCS: Performed by: SURGERY

## 2024-09-23 PROCEDURE — 47562 LAPAROSCOPIC CHOLECYSTECTOMY: CPT | Mod: ,,, | Performed by: SURGERY

## 2024-09-23 PROCEDURE — 36000708 HC OR TIME LEV III 1ST 15 MIN: Performed by: SURGERY

## 2024-09-23 PROCEDURE — 71000015 HC POSTOP RECOV 1ST HR: Performed by: SURGERY

## 2024-09-23 PROCEDURE — 63600175 PHARM REV CODE 636 W HCPCS: Performed by: ANESTHESIOLOGY

## 2024-09-23 PROCEDURE — C9290 INJ, BUPIVACAINE LIPOSOME: HCPCS | Performed by: SURGERY

## 2024-09-23 PROCEDURE — 37000009 HC ANESTHESIA EA ADD 15 MINS: Performed by: SURGERY

## 2024-09-23 PROCEDURE — 71000039 HC RECOVERY, EACH ADD'L HOUR: Performed by: SURGERY

## 2024-09-23 PROCEDURE — 27201423 OPTIME MED/SURG SUP & DEVICES STERILE SUPPLY: Performed by: SURGERY

## 2024-09-23 PROCEDURE — 63600175 PHARM REV CODE 636 W HCPCS: Performed by: NURSE ANESTHETIST, CERTIFIED REGISTERED

## 2024-09-23 PROCEDURE — 82962 GLUCOSE BLOOD TEST: CPT | Performed by: SURGERY

## 2024-09-23 RX ORDER — PROPOFOL 10 MG/ML
VIAL (ML) INTRAVENOUS
Status: DISCONTINUED | OUTPATIENT
Start: 2024-09-23 | End: 2024-09-23

## 2024-09-23 RX ORDER — BUPIVACAINE HYDROCHLORIDE AND EPINEPHRINE 2.5; 5 MG/ML; UG/ML
INJECTION, SOLUTION EPIDURAL; INFILTRATION; INTRACAUDAL; PERINEURAL
Status: DISCONTINUED | OUTPATIENT
Start: 2024-09-23 | End: 2024-09-23 | Stop reason: HOSPADM

## 2024-09-23 RX ORDER — FENTANYL CITRATE 50 UG/ML
INJECTION, SOLUTION INTRAMUSCULAR; INTRAVENOUS
Status: DISCONTINUED | OUTPATIENT
Start: 2024-09-23 | End: 2024-09-23

## 2024-09-23 RX ORDER — HYDROMORPHONE HYDROCHLORIDE 1 MG/ML
0.2 INJECTION, SOLUTION INTRAMUSCULAR; INTRAVENOUS; SUBCUTANEOUS EVERY 5 MIN PRN
Status: DISCONTINUED | OUTPATIENT
Start: 2024-09-23 | End: 2024-09-23 | Stop reason: HOSPADM

## 2024-09-23 RX ORDER — PHENYLEPHRINE HYDROCHLORIDE 10 MG/ML
INJECTION INTRAVENOUS
Status: DISCONTINUED | OUTPATIENT
Start: 2024-09-23 | End: 2024-09-23

## 2024-09-23 RX ORDER — ROCURONIUM BROMIDE 10 MG/ML
INJECTION, SOLUTION INTRAVENOUS
Status: DISCONTINUED | OUTPATIENT
Start: 2024-09-23 | End: 2024-09-23

## 2024-09-23 RX ORDER — DIPHENHYDRAMINE HYDROCHLORIDE 50 MG/ML
12.5 INJECTION INTRAMUSCULAR; INTRAVENOUS
Status: DISCONTINUED | OUTPATIENT
Start: 2024-09-23 | End: 2024-09-23 | Stop reason: HOSPADM

## 2024-09-23 RX ORDER — LIDOCAINE HYDROCHLORIDE 10 MG/ML
INJECTION, SOLUTION EPIDURAL; INFILTRATION; INTRACAUDAL; PERINEURAL
Status: DISCONTINUED | OUTPATIENT
Start: 2024-09-23 | End: 2024-09-23

## 2024-09-23 RX ORDER — OXYCODONE HYDROCHLORIDE 5 MG/1
5 TABLET ORAL
Status: DISCONTINUED | OUTPATIENT
Start: 2024-09-23 | End: 2024-09-23 | Stop reason: HOSPADM

## 2024-09-23 RX ORDER — DEXAMETHASONE SODIUM PHOSPHATE 4 MG/ML
INJECTION, SOLUTION INTRA-ARTICULAR; INTRALESIONAL; INTRAMUSCULAR; INTRAVENOUS; SOFT TISSUE
Status: DISCONTINUED | OUTPATIENT
Start: 2024-09-23 | End: 2024-09-23

## 2024-09-23 RX ORDER — MEPERIDINE HYDROCHLORIDE 50 MG/ML
12.5 INJECTION INTRAMUSCULAR; INTRAVENOUS; SUBCUTANEOUS EVERY 10 MIN PRN
Status: DISCONTINUED | OUTPATIENT
Start: 2024-09-23 | End: 2024-09-23 | Stop reason: HOSPADM

## 2024-09-23 RX ORDER — HYDROCODONE BITARTRATE AND ACETAMINOPHEN 5; 325 MG/1; MG/1
1 TABLET ORAL EVERY 6 HOURS PRN
Qty: 20 TABLET | Refills: 0 | Status: SHIPPED | OUTPATIENT
Start: 2024-09-23

## 2024-09-23 RX ORDER — ONDANSETRON HYDROCHLORIDE 2 MG/ML
4 INJECTION, SOLUTION INTRAVENOUS DAILY PRN
Status: DISCONTINUED | OUTPATIENT
Start: 2024-09-23 | End: 2024-09-23 | Stop reason: HOSPADM

## 2024-09-23 RX ORDER — GLUCAGON 1 MG
1 KIT INJECTION
Status: DISCONTINUED | OUTPATIENT
Start: 2024-09-23 | End: 2024-09-23 | Stop reason: HOSPADM

## 2024-09-23 RX ORDER — SODIUM CHLORIDE, SODIUM LACTATE, POTASSIUM CHLORIDE, CALCIUM CHLORIDE 600; 310; 30; 20 MG/100ML; MG/100ML; MG/100ML; MG/100ML
INJECTION, SOLUTION INTRAVENOUS CONTINUOUS PRN
Status: DISCONTINUED | OUTPATIENT
Start: 2024-09-23 | End: 2024-09-23

## 2024-09-23 RX ORDER — ONDANSETRON HYDROCHLORIDE 2 MG/ML
INJECTION, SOLUTION INTRAVENOUS
Status: DISCONTINUED | OUTPATIENT
Start: 2024-09-23 | End: 2024-09-23

## 2024-09-23 RX ORDER — CEFOXITIN SODIUM 2 G/50ML
INJECTION, SOLUTION INTRAVENOUS
Status: DISCONTINUED | OUTPATIENT
Start: 2024-09-23 | End: 2024-09-23

## 2024-09-23 RX ORDER — ACETAMINOPHEN 10 MG/ML
INJECTION, SOLUTION INTRAVENOUS
Status: DISCONTINUED | OUTPATIENT
Start: 2024-09-23 | End: 2024-09-23

## 2024-09-23 RX ORDER — GABAPENTIN 300 MG/1
300 CAPSULE ORAL ONCE
Status: COMPLETED | OUTPATIENT
Start: 2024-09-23 | End: 2024-09-23

## 2024-09-23 RX ORDER — FAMOTIDINE 10 MG/ML
INJECTION INTRAVENOUS
Status: DISCONTINUED | OUTPATIENT
Start: 2024-09-23 | End: 2024-09-23

## 2024-09-23 RX ORDER — SUCCINYLCHOLINE CHLORIDE 20 MG/ML
INJECTION INTRAMUSCULAR; INTRAVENOUS
Status: DISCONTINUED | OUTPATIENT
Start: 2024-09-23 | End: 2024-09-23

## 2024-09-23 RX ADMIN — PHENYLEPHRINE HYDROCHLORIDE 100 MCG: 10 INJECTION INTRAVENOUS at 08:09

## 2024-09-23 RX ADMIN — HYDROMORPHONE HYDROCHLORIDE 0.2 MG: 1 INJECTION, SOLUTION INTRAMUSCULAR; INTRAVENOUS; SUBCUTANEOUS at 09:09

## 2024-09-23 RX ADMIN — GABAPENTIN 300 MG: 300 CAPSULE ORAL at 06:09

## 2024-09-23 RX ADMIN — PROPOFOL 130 MG: 10 INJECTION, EMULSION INTRAVENOUS at 07:09

## 2024-09-23 RX ADMIN — ONDANSETRON 4 MG: 2 INJECTION INTRAMUSCULAR; INTRAVENOUS at 08:09

## 2024-09-23 RX ADMIN — OXYCODONE HYDROCHLORIDE 5 MG: 5 TABLET ORAL at 09:09

## 2024-09-23 RX ADMIN — ROCURONIUM BROMIDE 5 MG: 10 INJECTION, SOLUTION INTRAVENOUS at 07:09

## 2024-09-23 RX ADMIN — SUGAMMADEX 200 MG: 100 INJECTION, SOLUTION INTRAVENOUS at 08:09

## 2024-09-23 RX ADMIN — ROCURONIUM BROMIDE 45 MG: 10 INJECTION, SOLUTION INTRAVENOUS at 07:09

## 2024-09-23 RX ADMIN — SODIUM CHLORIDE, SODIUM LACTATE, POTASSIUM CHLORIDE, AND CALCIUM CHLORIDE: .6; .31; .03; .02 INJECTION, SOLUTION INTRAVENOUS at 07:09

## 2024-09-23 RX ADMIN — GLYCOPYRROLATE 0.2 MG: 0.2 INJECTION, SOLUTION INTRAMUSCULAR; INTRAVITREAL at 07:09

## 2024-09-23 RX ADMIN — PHENYLEPHRINE HYDROCHLORIDE 100 MCG: 10 INJECTION INTRAVENOUS at 07:09

## 2024-09-23 RX ADMIN — LIDOCAINE HYDROCHLORIDE 50 MG: 10 INJECTION, SOLUTION EPIDURAL; INFILTRATION; INTRACAUDAL; PERINEURAL at 07:09

## 2024-09-23 RX ADMIN — FAMOTIDINE 20 MG: 10 INJECTION, SOLUTION INTRAVENOUS at 07:09

## 2024-09-23 RX ADMIN — FENTANYL CITRATE 50 MCG: 50 INJECTION, SOLUTION INTRAMUSCULAR; INTRAVENOUS at 07:09

## 2024-09-23 RX ADMIN — ACETAMINOPHEN 1000 MG: 10 INJECTION, SOLUTION INTRAVENOUS at 07:09

## 2024-09-23 RX ADMIN — Medication 120 MG: at 07:09

## 2024-09-23 RX ADMIN — CEFOXITIN SODIUM 2 G: 2 INJECTION, SOLUTION INTRAVENOUS at 07:09

## 2024-09-23 RX ADMIN — DEXAMETHASONE SODIUM PHOSPHATE 4 MG: 4 INJECTION, SOLUTION INTRAMUSCULAR; INTRAVENOUS at 07:09

## 2024-09-23 NOTE — ANESTHESIA POSTPROCEDURE EVALUATION
Anesthesia Post Evaluation    Patient: Skip Curran    Procedure(s) Performed: Procedure(s) (LRB):  CHOLECYSTECTOMY, LAPAROSCOPIC (N/A)    Final Anesthesia Type: general      Patient location during evaluation: PACU  Patient participation: Yes- Able to Participate  Level of consciousness: awake and alert and oriented  Post-procedure vital signs: reviewed and stable  Pain management: adequate  Airway patency: patent    PONV status at discharge: No PONV  Anesthetic complications: no      Cardiovascular status: blood pressure returned to baseline and hemodynamically stable  Respiratory status: unassisted, spontaneous ventilation and room air  Hydration status: euvolemic  Follow-up not needed.              Vitals Value Taken Time   /57 09/23/24 1000   Temp 36.6 °C (97.8 °F) 09/23/24 0850   Pulse 71 09/23/24 1003   Resp 12 09/23/24 1005   SpO2 98 % 09/23/24 1003   Vitals shown include unfiled device data.      No case tracking events are documented in the log.      Pain/Ewa Score: Pain Rating Prior to Med Admin: 6 (9/23/2024  9:40 AM)  Ewa Score: 10 (9/23/2024 10:00 AM)

## 2024-09-23 NOTE — DISCHARGE SUMMARY
[facility]  Discharge Note  Short Stay    Procedure(s) (LRB):  CHOLECYSTECTOMY, LAPAROSCOPIC (N/A)      OUTCOME: Patient tolerated treatment/procedure well without complication and is now ready for discharge.    DISPOSITION: Home or Self Care    FINAL DIAGNOSIS:  <principal problem not specified>    FOLLOWUP: In clinic    DISCHARGE INSTRUCTIONS:    Discharge Procedure Orders   Diet Adult Regular     Ice to affected area     Lifting restrictions   Order Comments: Please avoid lifting greater than 40 lb, straining, strenuous activity for four weeks.     Change dressing (specify)   Order Comments: Post-Operative Wound Care    A surgical glue has been placed over your incisions, please leave the glue in place and do not attempt to remove it.  It is ok to shower using mild soap and water over the incisions the day after your procedure. Pat dry your incisions. Do not soak in a bathtub or other body of water for 2 weeks or until cleared by your surgeon.     If you noticed redness, swelling, fever, increasing pain or significant drainage from your wound please call/message the office or the 24 hr nurse hotline after hours.     Notify your health care provider if you experience any of the following:  temperature >100.4     Notify your health care provider if you experience any of the following:  persistent nausea and vomiting or diarrhea     Notify your health care provider if you experience any of the following:  severe uncontrolled pain     Notify your health care provider if you experience any of the following:  redness, tenderness, or signs of infection (pain, swelling, redness, odor or green/yellow discharge around incision site)     Notify your health care provider if you experience any of the following:  worsening rash     Notify your health care provider if you experience any of the following:  increased confusion or weakness     Shower on day dressing removed (No bath)        TIME SPENT ON DISCHARGE:  10 minutes

## 2024-09-23 NOTE — OP NOTE
DATE OF PROCEDURE: 09/23/2024    PREOPERATIVE DIAGNOSIS: Symptomatic cholelithiasis    POSTOPERATIVE DIAGNOSIS: Same    PROCEDURE: Laparoscopic cholecystectomy    SURGEON: Asim Santiago M.D    ASSISTANT: Jarek Gamble MD PGY 3    ANESTHESIA: General endotracheal    ESTIMATED BLOOD LOSS: Minimal    SPECIMEN: Gallbladder    CONDITION: Stable    COMPLICATIONS: None    FINDINGS:   1. Gallbladder grossly normal   2. Critical view of safety achieved  3. Minimal spillage of bile and 1 stone occurred, this was suctioned clean     INDICATIONS: The patient is a 74 y.o. female who presented to clinic with a  history of right upper quadrant epigastric pain suggestive of biliary etiology. The patient was counseled on their surgical options and desired surgical intervention. The risks of the procedure were described to the patient including pain, infection, bleeding, scarring, wound complications, injury to local structures such as bile duct, liver or intestine, warranting more extensive surgery, retained common bile duct stone or need for further intervention. The patient demonstrated understanding of these risks and a consent form was obtained.    PROCEDURE IN DETAIL: PROCEDURE IN DETAIL: The patient was identified in the Preoperative Unit and taken back to the Operating Room and laid supine on the operating room table. IV antibiotics were administered and general anesthesia was induced without complication. The patient was then prepped and draped in the standard sterile fashion. A timeout was performed in accordance with hospital protocol.  A Veress needle was introduced into the abdominal cavity and insufflation was attached. We had appropriate initial pressures and pneumoperitoneum was achieved. Local anesthetic was injected then a stab incision was sharply made just above the umbilicus. A 5 mm Optiview trocar was introduced into the peritoneal cavity under direct visualization.  We examined the trocar and Veress needle  insertion sites and no obvious injury was identified. An 11 mm trocar was then placed in subxiphoid region under direct visualization after injecting local anesthetic.  Two additional 5 mm trocars were placed in the right mid and right lateral abdomen under direct visualization again after injecting local anesthetic. The gallbladder was identified and found to be grossly normal. The gallbladder fundus was grasped and retracted into the right upper quadrant and the infundibulum retracted laterally. The peritoneum over the infundibulum and cystic structures was then gently stripped until the cystic duct and artery were identified and the critical view of safety was achieved.The cystic duct and artery were doubly clipped proximally and once distally and then divided. The gallbladder was then dissected off the gallbladder fossa using electrocautery. Small hole in the dome of the gallbladder occurred during dissection.  This has spilled a small amount of bile and 1 small stone.  This was suctioned cleaned. Once dissection was completed, the gallbladder was placed into an EndoCatch bag and removed through the subxiphoid port. The right upper quadrant was then irrigated and then suctioned. The dissection field was inspected for hemostasis, bile leak and to confirmed clips were in place. Additional local anesthetic was injected around the port sites under direct visualization.  The subxiphoid fascial incision was closed with a 0 Vicryl suture. The abdomen was desufflated and ports removed. Additional local anesthetic was injected and all the skin incisions were closed using a 4-0 Monocryl subcuticular stitch. Dermabond was then applied. The patient was awakened from general anesthesia without complication and returned to the Postoperative Recovery Unit in stable condition. At the end of the case, sponge, instrument and needle counts were correct on 2 occasions. I was present and scrubbed throughout the entirety of the  case.

## 2024-09-23 NOTE — ANESTHESIA PROCEDURE NOTES
Intubation    Date/Time: 9/23/2024 7:34 AM    Performed by: Clint Elliott CRNA  Authorized by: Bryan Lemus MD    Intubation:     Induction:  Intravenous    Intubated:  Postinduction    Mask Ventilation:  Not attempted    Attempts:  1    Attempted By:  CRNA    Method of Intubation:  Fiberoptic    Blade:  Phelan 3    Laryngeal View Grade: Grade I - full view of cords      Difficult Airway Encountered?: No      Complications:  None    Airway Device:  Oral endotracheal tube    Airway Device Size:  7.5    Inflation Amount (mL):  5    Tube secured:  21    Secured at:  The lips    Placement Verified By:  Capnometry and Fiber optic visualization    Complicating Factors:  None    Findings Post-Intubation:  BS equal bilateral and atraumatic/condition of teeth unchanged
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
## 2024-09-23 NOTE — DISCHARGE INSTRUCTIONS
DISCHARGE INSTRUCTIONS    No driving, operating heavy machinery or signing legal documents x 24 hours  No drinking alcohol x 24 hours or while taking pain medication  You should not be driving while taking pain medication  Your incisions are glued with dermaboond- do not peel off - it will fall off when it is heeled.  Do Not submerge wound in a tub, any pools of water or swimming pools until wound is completely healed  Keep an eye on wound- edges should be pink and well approximated-  the wound should not be red and inflamed.  Wound edges should not be .   Your wound should not be draining anything green, yellow or foul smelling- for these call the MD  You should not be running a temp greater than 101   Keep scheduled follow up appt  Call Md for problems or concerns

## 2024-09-23 NOTE — PLAN OF CARE
1055   Discharge instructions reviewed with pt and daughter.  Verbalized understanding and copy of instructions given to pt.   1100 Escorted to car in wheelchair and discharged to home with daughter.
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
## 2024-09-23 NOTE — TRANSFER OF CARE
Anesthesia Transfer of Care Note    Patient: Skip Curran    Procedure(s) Performed: Procedure(s) (LRB):  CHOLECYSTECTOMY, LAPAROSCOPIC (N/A)    Patient location: PACU    Anesthesia Type: general    Transport from OR: Transported from OR on room air with adequate spontaneous ventilation    Post pain: adequate analgesia    Post assessment: no apparent anesthetic complications    Post vital signs: stable    Level of consciousness: awake    Nausea/Vomiting: no nausea/vomiting    Complications: none    Transfer of care protocol was followed    Last vitals: Visit Vitals  /82 (BP Location: Right arm, Patient Position: Sitting)   Pulse 72   Temp 36.7 °C (98.1 °F) (Oral)   Resp 18   Ht 5' (1.524 m)   Wt 78 kg (172 lb)   SpO2 98%   Breastfeeding No   BMI 33.59 kg/m²

## 2024-09-25 ENCOUNTER — PATIENT MESSAGE (OUTPATIENT)
Dept: DIABETES | Facility: CLINIC | Age: 74
End: 2024-09-25
Payer: COMMERCIAL

## 2024-10-08 ENCOUNTER — TELEPHONE (OUTPATIENT)
Dept: DIABETES | Facility: CLINIC | Age: 74
End: 2024-10-08
Payer: COMMERCIAL

## 2024-10-09 ENCOUNTER — OFFICE VISIT (OUTPATIENT)
Dept: SURGERY | Facility: CLINIC | Age: 74
End: 2024-10-09
Payer: MEDICARE

## 2024-10-09 VITALS — SYSTOLIC BLOOD PRESSURE: 101 MMHG | DIASTOLIC BLOOD PRESSURE: 70 MMHG | HEART RATE: 79 BPM | TEMPERATURE: 98 F

## 2024-10-09 DIAGNOSIS — Z90.49 S/P LAPAROSCOPIC CHOLECYSTECTOMY: Primary | ICD-10-CM

## 2024-10-09 PROCEDURE — 99212 OFFICE O/P EST SF 10 MIN: CPT | Mod: PBBFAC,PN | Performed by: SURGERY

## 2024-10-09 PROCEDURE — 99999 PR PBB SHADOW E&M-EST. PATIENT-LVL II: CPT | Mod: PBBFAC,,, | Performed by: SURGERY

## 2024-10-09 PROCEDURE — 99024 POSTOP FOLLOW-UP VISIT: CPT | Mod: POP,,, | Performed by: SURGERY

## 2024-10-09 NOTE — PROGRESS NOTES
GENERAL SURGERY  POST-OP PROGRESS NOTE    HPI: Skip Curran is a 74 y.o. female status post cholecystectomy for symptomatic cholelithiasis here for follow-up.  Overall pain much improved. Postoperative discomfort minimal.  No fevers or chills.  Did have severe asthma exacerbation a few days after surgery which is now resolved. Having intermittent constipation and diarrhea.  Just started fiber to hopefully help bowel function become more stable.    VITALS:  Vitals:    10/09/24 0952   BP: 101/70   Pulse: 79   Temp: 97.7 °F (36.5 °C)       PHYSICAL EXAM:  All abdominal port sites well healed without signs of infection or breakdown    PATHOLOGY:  GALLBLADDER, CHOLECYSTECTOMY:     --CHRONIC CHOLECYSTITIS.     --CHOLELITHIASIS.     ASSESSMENT & PLAN:  74 y.o. female s/p laparoscopic cholecystectomy  -symptomatically improved  -continue fiber supplement for intermittent constipation and diarrhea, encourage water intake  -return to clinic p.r.n.

## 2024-10-11 ENCOUNTER — PATIENT MESSAGE (OUTPATIENT)
Dept: CARDIOLOGY | Facility: CLINIC | Age: 74
End: 2024-10-11
Payer: COMMERCIAL

## 2024-10-15 ENCOUNTER — PATIENT MESSAGE (OUTPATIENT)
Dept: DIABETES | Facility: CLINIC | Age: 74
End: 2024-10-15
Payer: COMMERCIAL

## 2024-10-25 ENCOUNTER — HOSPITAL ENCOUNTER (OUTPATIENT)
Dept: RADIOLOGY | Facility: HOSPITAL | Age: 74
Discharge: HOME | End: 2024-10-25
Attending: PHYSICAL MEDICINE & REHABILITATION
Payer: MEDICARE

## 2024-10-25 DIAGNOSIS — Z12.31 ENCOUNTER FOR SCREENING MAMMOGRAM FOR MALIGNANT NEOPLASM OF BREAST: ICD-10-CM

## 2024-10-25 PROCEDURE — 77067 SCR MAMMO BI INCL CAD: CPT | Mod: 26,,, | Performed by: RADIOLOGY

## 2024-10-25 PROCEDURE — 77067 SCR MAMMO BI INCL CAD: CPT | Mod: TC

## 2024-10-25 PROCEDURE — 77063 BREAST TOMOSYNTHESIS BI: CPT | Mod: 26,,, | Performed by: RADIOLOGY

## 2024-10-28 ENCOUNTER — PATIENT MESSAGE (OUTPATIENT)
Dept: PRIMARY CARE CLINIC | Facility: CLINIC | Age: 74
End: 2024-10-28
Payer: COMMERCIAL

## 2024-11-01 ENCOUNTER — OFFICE VISIT (OUTPATIENT)
Dept: DIABETES | Facility: CLINIC | Age: 74
End: 2024-11-01
Payer: MEDICARE

## 2024-11-01 VITALS
OXYGEN SATURATION: 96 % | BODY MASS INDEX: 33.21 KG/M2 | HEIGHT: 60 IN | HEART RATE: 81 BPM | SYSTOLIC BLOOD PRESSURE: 110 MMHG | DIASTOLIC BLOOD PRESSURE: 71 MMHG | WEIGHT: 169.13 LBS

## 2024-11-01 DIAGNOSIS — Z96.41 INSULIN PUMP IN PLACE: ICD-10-CM

## 2024-11-01 DIAGNOSIS — Z46.81 INSULIN PUMP FITTING OR ADJUSTMENT: ICD-10-CM

## 2024-11-01 DIAGNOSIS — E78.5 DYSLIPIDEMIA, GOAL LDL BELOW 100: ICD-10-CM

## 2024-11-01 DIAGNOSIS — E66.811 CLASS 1 OBESITY DUE TO EXCESS CALORIES WITH SERIOUS COMORBIDITY AND BODY MASS INDEX (BMI) OF 33.0 TO 33.9 IN ADULT: ICD-10-CM

## 2024-11-01 DIAGNOSIS — Z71.9 HEALTH EDUCATION/COUNSELING: ICD-10-CM

## 2024-11-01 DIAGNOSIS — Z79.4 TYPE 2 DIABETES MELLITUS WITH HYPERGLYCEMIA, WITH LONG-TERM CURRENT USE OF INSULIN: Primary | ICD-10-CM

## 2024-11-01 DIAGNOSIS — I10 PRIMARY HYPERTENSION: ICD-10-CM

## 2024-11-01 DIAGNOSIS — L30.9 DERMATITIS: ICD-10-CM

## 2024-11-01 DIAGNOSIS — E66.09 CLASS 1 OBESITY DUE TO EXCESS CALORIES WITH SERIOUS COMORBIDITY AND BODY MASS INDEX (BMI) OF 33.0 TO 33.9 IN ADULT: ICD-10-CM

## 2024-11-01 DIAGNOSIS — E11.65 TYPE 2 DIABETES MELLITUS WITH HYPERGLYCEMIA, WITH LONG-TERM CURRENT USE OF INSULIN: Primary | ICD-10-CM

## 2024-11-01 PROCEDURE — 99999 PR PBB SHADOW E&M-EST. PATIENT-LVL V: CPT | Mod: PBBFAC,,, | Performed by: NURSE PRACTITIONER

## 2024-11-01 PROCEDURE — 99215 OFFICE O/P EST HI 40 MIN: CPT | Mod: PBBFAC,PN | Performed by: NURSE PRACTITIONER

## 2024-11-01 RX ORDER — METFORMIN HYDROCHLORIDE 500 MG/1
500 TABLET, EXTENDED RELEASE ORAL 2 TIMES DAILY WITH MEALS
Qty: 180 TABLET | Refills: 2 | Status: SHIPPED | OUTPATIENT
Start: 2024-11-01 | End: 2026-01-30

## 2024-11-01 RX ORDER — BLOOD-GLUCOSE TRANSMITTER
1 EACH MISCELLANEOUS
Qty: 1 EACH | Refills: 4 | Status: SHIPPED | OUTPATIENT
Start: 2024-11-01 | End: 2025-11-01

## 2024-11-01 RX ORDER — INSULIN ASPART INJECTION 100 [IU]/ML
INJECTION, SOLUTION SUBCUTANEOUS
Qty: 30 ML | Refills: 11 | Status: SHIPPED | OUTPATIENT
Start: 2024-11-01

## 2024-11-01 RX ORDER — TRIAMCINOLONE ACETONIDE 1 MG/G
CREAM TOPICAL 2 TIMES DAILY
Qty: 80 G | Refills: 2 | Status: SHIPPED | OUTPATIENT
Start: 2024-11-01

## 2024-11-01 RX ORDER — BLOOD-GLUCOSE SENSOR
1 EACH MISCELLANEOUS
Qty: 3 EACH | Refills: 11 | Status: SHIPPED | OUTPATIENT
Start: 2024-11-01 | End: 2025-11-01

## 2024-11-01 RX ORDER — INSULIN PMP CART,AUT,G6/7,CNTR
1 EACH SUBCUTANEOUS
Qty: 3 EACH | Refills: 11 | Status: SHIPPED | OUTPATIENT
Start: 2024-11-01

## 2024-11-01 RX ORDER — TIRZEPATIDE 15 MG/.5ML
15 INJECTION, SOLUTION SUBCUTANEOUS
Qty: 4 PEN | Refills: 11 | Status: SHIPPED | OUTPATIENT
Start: 2024-11-01

## 2024-11-20 ENCOUNTER — TELEPHONE (OUTPATIENT)
Dept: DIABETES | Facility: CLINIC | Age: 74
End: 2024-11-20
Payer: COMMERCIAL

## 2024-12-04 ENCOUNTER — NUTRITION (OUTPATIENT)
Dept: DIABETES | Facility: CLINIC | Age: 74
End: 2024-12-04
Payer: MEDICARE

## 2024-12-04 DIAGNOSIS — E11.65 TYPE 2 DIABETES MELLITUS WITH HYPERGLYCEMIA, WITH LONG-TERM CURRENT USE OF INSULIN: Primary | ICD-10-CM

## 2024-12-04 DIAGNOSIS — Z79.4 TYPE 2 DIABETES MELLITUS WITH HYPERGLYCEMIA, WITH LONG-TERM CURRENT USE OF INSULIN: Primary | ICD-10-CM

## 2024-12-04 PROCEDURE — 99999 PR PBB SHADOW E&M-EST. PATIENT-LVL I: CPT | Mod: PBBFAC,,, | Performed by: DIETITIAN, REGISTERED

## 2024-12-04 PROCEDURE — 99999PBSHW PR PBB SHADOW TECHNICAL ONLY FILED TO HB: Mod: PBBFAC,,,

## 2024-12-04 PROCEDURE — 99211 OFF/OP EST MAY X REQ PHY/QHP: CPT | Mod: PBBFAC,PN | Performed by: DIETITIAN, REGISTERED

## 2024-12-04 PROCEDURE — G0108 DIAB MANAGE TRN  PER INDIV: HCPCS | Mod: PBBFAC,PN | Performed by: DIETITIAN, REGISTERED

## 2024-12-16 NOTE — PROGRESS NOTES
Diabetes Care Specialist Follow-up Note  Author: Alisa Daley RD, CDE  Date: 12/16/2024    Intake    Program Intake  Reason for Diabetes Program Visit:: Intervention  Type of Intervention:: Individual  Individual: Device Training  Device Training: Other, Personal CGM  Current diabetes risk level:: low  In the last month, have you used the ER or been admitted to the hospital: No  Permission to speak with others about care:: no    Current Diabetes Treatment: Insulin, Oral Medications, DM Injectables  Oral Medication Type/Dose: metformin ER 500mg Twice a day  DM Injectables Type/Dose: tirzepatide 15mg/0.5ml once a week  Method of insulin delivery?: Insulin Pump  Type of Pump: omnipod 5  Does patient have back-up plan?: Yes  Any problems obtaining supplies?: No    Continuous Glucose Monitoring  Patient has CGM: Yes  Personal CGM type:: Dexcom  GMI Date: 03/21/24  GMI Value: 6.4 %    Lab Results   Component Value Date    LABA1C 7.7 (H) 02/28/2018    HGBA1C 6.5 (H) 10/25/2024     A1c Pre Diabetes Care Specialist Intervention:  7.7%    Physical activity/Exercise:   No change    SMBG: dexcom G6 with omnipod 5                Lifestyle Coping Support & Clinical    Lifestyle/Coping/Support  Compared to other people your age, how would you rate your health?: Good  Psychosocial/Coping Skills Assessment Completed: : No  Assessment indicates:: Adequate understanding  Deffered due to:: Other (comment) (previously completed, there has been no change and patient has adequate understanding)  Area of need?: No    Problem Review  Active Comorbidities: Other (comment), Hypertension, Hyperlipidemia/Dyslipidemia, Obesity, Gastrointestinal Disorder (Asthma)    Diabetes Self-Management Skills Assessment    Medication Skills Assessment  Patient is able to identify current diabetes medications, dosages, and appropriate timing of medications.: yes  Patient reports problems or concerns with current medication regimen.: yes  Medication regimen  problems/concerns:: other (see comments) (will need instruction on the omnipod 5)  Patient is  aware that some diabetes medications can cause low blood sugar?: Yes  Medication Skills Assessment Completed:: Yes  Assessment indicates:: Instruction Needed, Adequate understanding  Area of need?: Yes    Diabetes Disease Process/Treatment Options  Diabetes Type?: Type II  Diabetes Disease Process/Treatment Options: Skills Assessment Completed: No  Assessment indicates:: Adequate understanding  Deferred due to:: Other (comment) (previously completed, there has been no change and patient has adequate understanding)  Area of need?: No    Nutrition/Healthy Eating  Meal Plan 24 Hour Recall - Breakfast: toast or bagel and coffee- with sweet and low and SF creamer  Meal Plan 24 Hour Recall - Lunch: sister in law cooks every day  Meal Plan 24 Hour Recall - Dinner: home cooked  Meal Plan 24 Hour Recall - Snack: peanuts and candy sometimes  Patient can identify foods that impact blood sugar.: yes  Challenges to healthy eating:: portion control, snacking between meals and at night  Nutrition/Healthy Eating Skills Assessment Completed:: No  Assessment indicates:: Adequate understanding  Deffered due to:: Other (comment) (previously completed, there has been no change and patient has adequate understanding)  Area of need?: No    Physical Activity/Exercise  Physical Activity/Exercise Skills Assessment Completed: : No  Assessment indicates:: Adequate understanding  Deffered due to:: Other (comment) (previously completed, there has been no change and patient has adequate understanding)  Area of need?: No    Home Blood Glucose Monitoring  Patient states that blood sugar is checked at home daily.: yes  Monitoring Method:: personal continuous glucose monitor  Personal CGM type:: Dexcom   What is your current Time in Range?: 93%  What is your A1c Target?: 7.0  Home Blood Glucose Monitoring Skills Assessment Completed: : Yes  Assessment  indicates:: Adequate understanding, Other (comment)  Deferred due to:: Other (comment)  Area of need?: Yes    Acute Complications  Acute Complications Skills Assessment Completed: : No  Assessment indicates:: Adequate understanding  Deffered due to:: Other (comment) (previously completed, there has been no change and patient has adequate understanding)  Area of need?: No    Chronic Complications  Reviewed health maintenance: yes  Chronic Complications Skills Assessment Completed: : No  Assessment indicates:: Adequate understanding  Deferred due to:: Other (comment) (previously completed, there has been no change and patient has adequate understanding)  Area of need?: No      During today's follow-up visit,  the following areas required further assessment and content was provided/reviewed.    Based on today's diabetes care assessment, the following areas of need were identified:      Identified Areas of Need      Medication/Current Diabetes Treatment: Yes, here to put the omnipod 5 ana on phone, having trouble setting up a pin number, after 1.5 hours of unsuccessfully setting up pin, patient will go to [facility] to get phone settings adjusted   Lifestyle Coping/Support: No   Diabetes Disease Process/Treatment Options: No   Nutrition/Healthy Eating: No    Physical Activity/Exercise: No    Home Blood Glucose Monitoring: Yes reviewed with patient and provider   Acute Complications: No    Chronic Complications: No       Today's interventions were provided through individual discussion, instruction, and written materials were provided.    Patient verbalized understanding of instruction and written materials.  Pt was able to return back demonstration of instructions today. Patient understood key points, needs reinforcement and further instruction.     Diabetes Self-Management Care Plan Review and Evaluation of Progress:    During today's follow-up Ivonne Diabetes Self-Management Care Plan progress was reviewed and  progress was evaluated including his/her input. Skip has agreed to continue his/her journey to improve/maintain overall diabetes control by continuing to set health goals. See care plan progress below.      Care Plan: Diabetes Management   Updates made since 12/17/2023 12:00 AM        Problem: Healthy Eating Resolved 9/17/2024        Long-Range Goal: Eat 3 meals daily with 30-45g/2-3 servings of Carbohydrate per meal. Completed 9/17/2024   Start Date: 3/3/2021   Expected End Date: 9/17/2024   This Visit's Progress: Met   Recent Progress: On track   Priority: Medium   Barriers: No Barriers Identified   Note:    Plan is to review carb counting, portion control, importance of spacing meals throughout the day to prevent post prandial elevations.  Recommended low saturated fat, low sodium diet to aid in control of hypertension and cholesterol. Not completed 3/3 due to time       Task: Reviewed the sources and role of Carbohydrate, Protein, and Fat and how each nutrient impacts blood sugar. Completed 3/18/2024        Task: Provided visual examples using dry measuring cups, food models, and other familiar objects such as computer mouse, deck or cards, tennis ball etc. to help with visualization of portions. Completed 5/13/2024        Task: Explained how to count carbohydrates using the food label and the use of dry measuring cups for accurate carb counting. Completed 4/5/2024        Task: Discussed strategies for choosing healthier menu options when dining out. Completed 5/13/2024        Task: Review the importance of balancing carbohydrates with each meal using portion control techniques to count servings of carbohydrate and label reading to identify serving size and amount of total carbs per serving. Completed 5/13/2024        Task: Provided Sample plate method and reviewed the use of the plate to estimate amounts of carbohydrate per meal. Completed 5/13/2024        Problem: Medications Resolved 9/17/2024   Note:     Plan to switch to the omnipod 5 from the VGO       Problem: Acute Complications Resolved 3/14/2024        Problem: Chronic Complications Resolved 4/5/2024        Goal: Patient agrees to have the following diabetes jessica maintenance screenings/appointments eye exam completed in the next 6 months. Completed 4/5/2024   Start Date: 3/3/2021   This Visit's Progress: Met   Recent Progress: On track   Priority: Low   Barriers: No Barriers Identified   Note:    ABC's of Diabetes   Discussed imporance of A1c less than 7 to reduce risk of micro and macro complications, controlled Blood Pressure 130/80 and Cholesterol Lab Values--Total Cholesterol <200mg, LDL < 100, HDL >45 men and >50 women, Triglycerides <150mg for prevention heart disease, heart attach, stroke. Diabetes Care Schedule   A1c every 3 to 6 months  Lipid Panel every year  Microalbumin (urine test) once per year  Comprehensive Foot Exam once per year  Dilated Eye Exam at least once per year  Dental exam every 6 months  Flu Vaccination yearly   Shingles and Phneumoia Vaccination as recommended by physician         Task: Discussed importance of annual microalbumin urine test to monitor kidney function. Completed 3/18/2024        Task: Discussed importance of annual dilated eye exam for prevention of retinopathy. Completed 3/18/2024        Task: Discussed the importance of routine dental exams for the prevention of gum disease and gingivitis and encouraged dental exam every 6 months. Completed 3/18/2024        Task: Instructed on basic daily foot care and encouraged inspecting feet daily. Completed 3/18/2024        Task: Discussed importance of the Flu and Pneumonia Vaccination. Completed 3/18/2024          Follow Up Plan     Follow up in about 3 months (around 3/4/2025) for General Follow-up, Personal CGM Upload, Insulin Pump Upload.    Today's care plan and follow up schedule was discussed with patient.  Skip verbalized understanding of the care plan, goals,  and agrees to follow up plan.        The patient was encouraged to communicate with his/her health care provider/physician and care team regarding his/her condition(s) and treatment.  I provided the patient with my contact information today and encouraged to contact me via phone or [facility] as needed.     Length of Visit   Total Time: 45 Minutes

## 2024-12-20 ENCOUNTER — OFFICE VISIT (OUTPATIENT)
Dept: PULMONOLOGY | Facility: CLINIC | Age: 74
End: 2024-12-20
Payer: MEDICARE

## 2024-12-20 VITALS
BODY MASS INDEX: 32.02 KG/M2 | RESPIRATION RATE: 19 BRPM | OXYGEN SATURATION: 96 % | DIASTOLIC BLOOD PRESSURE: 63 MMHG | HEIGHT: 60 IN | HEART RATE: 80 BPM | SYSTOLIC BLOOD PRESSURE: 95 MMHG | WEIGHT: 163.13 LBS

## 2024-12-20 DIAGNOSIS — J45.20 MILD INTERMITTENT ASTHMA WITHOUT COMPLICATION: ICD-10-CM

## 2024-12-20 DIAGNOSIS — J39.8 TRACHEOBRONCHOMALACIA DETERMINED BY BRONCHOSCOPY: ICD-10-CM

## 2024-12-20 DIAGNOSIS — R91.1 LUNG NODULE: ICD-10-CM

## 2024-12-20 DIAGNOSIS — J45.20 MILD INTERMITTENT ASTHMA, UNSPECIFIED WHETHER COMPLICATED: ICD-10-CM

## 2024-12-20 DIAGNOSIS — A31.0 PULMONARY MYCOBACTERIUM AVIUM COMPLEX (MAC) INFECTION: Primary | ICD-10-CM

## 2024-12-20 DIAGNOSIS — R05.3 CHRONIC COUGH: ICD-10-CM

## 2024-12-20 PROCEDURE — 99215 OFFICE O/P EST HI 40 MIN: CPT | Mod: PBBFAC,PN | Performed by: INTERNAL MEDICINE

## 2024-12-20 PROCEDURE — 99999 PR PBB SHADOW E&M-EST. PATIENT-LVL V: CPT | Mod: PBBFAC,,, | Performed by: INTERNAL MEDICINE

## 2024-12-20 RX ORDER — MELOXICAM 15 MG/1
15 TABLET ORAL
COMMUNITY
Start: 2024-12-05

## 2024-12-20 NOTE — PROGRESS NOTES
History & Physical  [facility]    SUBJECTIVE:     Chief Complaint:   MAC Pulmonary infection    History of Present Illness:  Skip Curran is a 74 y.o. female who presents for evaluation of lung nodules & mac pulmonary infection. Pt was last seen by a pulmonologist over four years ago.  Pulm History:  - The patient underwent bronchoscopy with BAL with Dr Andersen in 2019 that grew MAC.  - The patient had a percutaneous biopsy of her lung ordered by Dr Liriano in 2020 which was negative for cancer.  - She has a history of childhood asthma. Symptoms improved as she got older. Symptoms are triggered by certain smells- fertilizer, vinyl, strong plastic smells. Pt mostly controls her symptoms by avoiding triggers. She uses albuterol prn.    She works for [employer] in [employer].    Review of patient's allergies indicates:   Allergen Reactions    Aspirin Hives    Iodinated contrast media     Iodine Other (See Comments)     WHEN INJECTED- pt  states she coded    Pcn [penicillins] Rash       Past Medical History:   Diagnosis Date    Allergy     Arthritis     Asthma     Back injuries     two broken bones that healed on own    Cataract     Depression     Diabetes mellitus     Diabetes mellitus type I     GERD (gastroesophageal reflux disease)     Hypercholesteremia     Hypertension     Insomnia     Slow to wake up after anesthesia 2002    Orthoscope of knee     Past Surgical History:   Procedure Laterality Date    anal fissure surgery      BELPHAROPTOSIS REPAIR Bilateral 11/11/2022    Procedure: REPAIR, BLEPHAROPTOSIS;  Surgeon: Francheska Arechiga MD;  Location: [address];  Service: Ophthalmology;  Laterality: Bilateral;    BREAST BIOPSY Right     benign    BRONCHOSCOPY N/A 10/22/2019    Procedure: bronch ambero , noon;  Surgeon: Pepe Andersen MD;  Location: [facility];  Service: Endoscopy;  Laterality: N/A;    CATARACT EXTRACTION W/  INTRAOCULAR LENS IMPLANT Left 10/31/2023    Procedure: EXTRACTION,  CATARACT, WITH IOL INSERTION;  Surgeon: Dimas Sheppard MD;  Location: [facility] OR;  Service: Ophthalmology;  Laterality: Left;    CATARACT EXTRACTION W/  INTRAOCULAR LENS IMPLANT Right 11/14/2023    Procedure: EXTRACTION, CATARACT, WITH IOL INSERTION;  Surgeon: Dimas Sheppard MD;  Location: [facility] OR;  Service: Ophthalmology;  Laterality: Right;    COLONOSCOPY  09/18/2020    COLONOSCOPY N/A 9/18/2020    Procedure: COLONOSCOPY;  Surgeon: Magno Gant MD;  Location: [facility] ENDO;  Service: Colon and Rectal;  Laterality: N/A;    HYSTERECTOMY      KNEE ARTHROPLASTY Left 11/28/2022    Procedure: ARTHROPLASTY, KNEE TOTAL;  Surgeon: Jules Carolina MD;  Location: [facility] OR;  Service: Orthopedics;  Laterality: Left;    KNEE ARTHROSCOPY W/ DEBRIDEMENT Left     x3    LAPAROSCOPIC CHOLECYSTECTOMY N/A 9/23/2024    Procedure: CHOLECYSTECTOMY, LAPAROSCOPIC;  Surgeon: Asim Santiago Jr., MD;  Location: [facility] OR;  Service: General;  Laterality: N/A;    LIPOMA RESECTION      fatty tumor between breast    SHOULDER SURGERY Left 2/11/2016    Dr Burris     TONSILLECTOMY      TOTAL KNEE ARTHROPLASTY Right 3/18/2021    Procedure: ARTHROPLASTY, KNEE, TOTAL;  Surgeon: Rashi De La O MD;  Location: [facility];  Service: Orthopedics;  Laterality: Right;  ADDUCTOR BLOCK    WRIST RECONSTRUCTION Right     tendon     Family History   Problem Relation Name Age of Onset    Arthritis Mother      Hypertension Mother      Arthritis Father age 79     Diabetes Father age 79     Hypertension Father age 79     Breast cancer Sister      Cancer Sister          breast cancer     Breast cancer Paternal Aunt       Social History     Socioeconomic History    Marital status:    Occupational History    Occupation: [employer]     Employer: [employer]   Tobacco Use    Smoking status: Never    Smokeless tobacco: Never   Substance and Sexual Activity    Alcohol use: Not Currently     Alcohol/week: 1.0 standard drink  of alcohol     Types: 1 Shots of liquor per week    Drug use: No    Sexual activity: Never     Social Drivers of Health     Financial Resource Strain: Low Risk  (5/7/2024)    Overall Financial Resource Strain (CARDIA)     Difficulty of Paying Living Expenses: Not very hard   Food Insecurity: No Food Insecurity (5/7/2024)    Hunger Vital Sign     Worried About Running Out of Food in the Last Year: Never true     Ran Out of Food in the Last Year: Never true   Transportation Needs: No Transportation Needs (5/7/2024)    PRAPARE - Transportation     Lack of Transportation (Medical): No     Lack of Transportation (Non-Medical): No   Physical Activity: Insufficiently Active (5/7/2024)    Exercise Vital Sign     Days of Exercise per Week: 3 days     Minutes of Exercise per Session: 10 min   Stress: No Stress Concern Present (5/7/2024)    [employer] - Occupational Stress Questionnaire     Feeling of Stress : Only a little   Housing Stability: Unknown (5/7/2024)    Housing Stability Vital Sign     Unable to Pay for Housing in the Last Year: Patient declined     Review of Systems:  No pertinent positives.    OBJECTIVE:     Vital Signs  Vitals:    12/20/24 1040   BP: 95/63   BP Location: Left arm   Patient Position: Sitting   Pulse: 80   Resp: 19   SpO2: 96%   Weight: 74 kg (163 lb 2.3 oz)   Height: 5' (1.524 m)     Body mass index is 31.86 kg/m².    Physical Exam:  General: no distress  Eyes:  conjunctivae/corneas clear  Nose: no discharge  Neck: trachea midline with no masses appreciated  Lungs:  normal respiratory effort, no wheezes, no rales  Heart: regular rate and rhythm and no murmur  Abdomen: non-distended  Extremities: no cyanosis, no edema, no clubbing  Skin: No rashes or lesions. good skin turgor  Neurologic: alert, oriented, thought content appropriate    Laboratory:  Lab Results   Component Value Date    WBC 7.46 09/10/2024    HGB 12.4 09/10/2024    HCT 38.2 09/10/2024    MCV 87  09/10/2024     09/10/2024     Chest Imaging, My Impression:   Chest CT 2022: focal infiltrate/atelectasis in the right lung. Several small nodules.    Diagnostic Results:  Spirometry 2020: no obstruction    ASSESSMENT/PLAN:     MAC Pulmonary Infection  - Pt exhibits mild symptom (chronic morning cough productive of yellow mucus).  - Repeat AFB culture from sputum.  - Repeat chest CT.  - Obtain PFT.    Mild intermittent asthma  - Pt controls her symptoms my avoidance of triggers. Symptoms are currently controlled. Continue prn albuterol.  - Obtain PFT.    Tracheobronchomalacia  - Observed on bronchoscopy 2019. Doesn't appear to have much symptoms.    Total professional time spent for the encounter: 45 minutes  Time was spent preparing to see the patient, reviewing results of prior testing, obtaining and/or reviewing separately obtained history, performing a medically appropriate examination and interview, counseling and educating the patient/family, ordering medications/tests/procedures, referring and communicating with other health care professionals, documenting clinical information in the electronic health record, and independently interpreting results.    [place], MD  [facility]

## 2024-12-20 NOTE — PROGRESS NOTES
History and Physical Note  [facility]    Subjective:     Reason for visit: ***    Patient ID:  Skip Curran is a 74 y.o. female    Interval History:  Pulmonary Nodule. Here for follow up. She has no complaints.     Chief Complaint   Patient presents with    Establish Care    Pulmonary Nodules          Additional Pulmonary History:  Tobacco/vape: none  Occupation: Retired school but , currently working with code enforcement.   Environmental exposures: none  Exertional capacity: work  Prior lung disease: asthma as child   Cough: morning   Sputum production: thick clear   Allergies/sinusitis: Afrin at night   Pets: dog   Travel/TB: none  Respiratory regimen: none   Prior cancer:   Family: Twin sister blanca and bone cancer, brother leukemia   Prior hospitalization/intubation: gall bladder removed two months ago ([facility])         Today he/ she is feeling    ROS     Objective:     Vitals:    12/20/24 1040   BP: 95/63   BP Location: Left arm   Patient Position: Sitting   Pulse: 80   Resp: 19   SpO2: 96%   Weight: 74 kg (163 lb 2.3 oz)   Height: 5' (1.524 m)         Physical Exam     Personal Diagnostic Review and Interpretation  ***      Pertinent Studies Reviewed & Interpreted:     Pulmonary Function Tests:   Date:   FEV1/FVC: %  FEV1: **L (%)  FVC: **L (89%)  TLC: ** L (85%)  DLCO: %  Bronchodilators: ***        6 Minute Walk Tests:   {6MWT NORMAL SCALE:5663227390}  This qualifies them for oxygen therapy: {YES-DESCRIBE/NO:56780}  O2 sats at rest: ***  O2 sats w/activity: ***  O2 sats w/activity on 2LNC: ***  Appropriate HTN and tachycardia.     Echocardiograms:   ***    Other Pertinent Laboratories:  Lab Results   Component Value Date    WBC 7.46 09/10/2024    RBC 4.38 09/10/2024    HGB 12.4 09/10/2024    MCV 87 09/10/2024    MCH 28.3 09/10/2024    MCHC 32.5 09/10/2024    RDW 13.2 09/10/2024     09/10/2024    MPV 9.6 09/10/2024    GRAN 4.5 09/10/2024    GRAN 60.8 09/10/2024    LYMPH 1.9  "09/10/2024    LYMPH 25.7 09/10/2024    MONO 0.6 09/10/2024    MONO 7.5 09/10/2024    EOS 0.4 09/10/2024    BASO 0.05 09/10/2024       ***      Assessment & Plan:       Plan:  Clinical impression is {Actions;was done:20760::"ambiguous and will need repeated evaluation wrt ***.", "resonably certain and repeated evaluation prn +/- follow up will be needed as below.","apparently straight forward and impression with management as below."}      1. Lung nodule  -     Ambulatory referral/consult to Pulmonology        No follow-ups on file.    Discussed with patient above for education the following:      There are no Patient Instructions on file for this visit.    RETURN TO CLINIC IN *** MONTHS     Total professional time spent for the encounter: *** minutes  Time was spent preparing to see the patient, reviewing results of prior testing, obtaining and/or reviewing separately obtained history, performing a medically appropriate examination and interview, counseling and educating the patient/family, ordering medications/tests/procedures, referring and communicating with other health care professionals, documenting clinical information in the electronic health record, and independently interpreting results.    Balbina Jha, ANDRYP-C   "

## 2025-01-07 ENCOUNTER — CLINICAL SUPPORT (OUTPATIENT)
Dept: DIABETES | Facility: CLINIC | Age: 75
End: 2025-01-07
Payer: MEDICARE

## 2025-01-07 DIAGNOSIS — Z79.4 TYPE 2 DIABETES MELLITUS WITH HYPERGLYCEMIA, WITH LONG-TERM CURRENT USE OF INSULIN: Primary | ICD-10-CM

## 2025-01-07 DIAGNOSIS — E11.65 TYPE 2 DIABETES MELLITUS WITH HYPERGLYCEMIA, WITH LONG-TERM CURRENT USE OF INSULIN: Primary | ICD-10-CM

## 2025-01-07 PROCEDURE — G0108 DIAB MANAGE TRN  PER INDIV: HCPCS | Mod: PBBFAC,PN | Performed by: DIETITIAN, REGISTERED

## 2025-01-07 PROCEDURE — 99999PBSHW PR PBB SHADOW TECHNICAL ONLY FILED TO HB: Mod: PBBFAC,,,

## 2025-01-07 PROCEDURE — 99999 PR PBB SHADOW E&M-EST. PATIENT-LVL I: CPT | Mod: PBBFAC,,, | Performed by: DIETITIAN, REGISTERED

## 2025-01-07 PROCEDURE — 99211 OFF/OP EST MAY X REQ PHY/QHP: CPT | Mod: PBBFAC,PN | Performed by: DIETITIAN, REGISTERED

## 2025-01-07 NOTE — PROGRESS NOTES
Diabetes Care Specialist Follow-up Note  Author: Alisa Daley RD, CDE  Date: 1/7/2025    Intake    Program Intake  Reason for Diabetes Program Visit:: Intervention  Type of Intervention:: Individual  Individual: Device Training  Device Training: Other (re-set up ana on iphone)  Current diabetes risk level:: low  In the last month, have you used the ER or been admitted to the hospital: No  Permission to speak with others about care:: no    Current Diabetes Treatment: Insulin, Oral Medications, DM Injectables  Oral Medication Type/Dose: metformin ER 500mg Twice a day  DM Injectables Type/Dose: tirzepatide 15mg/0.5ml once a week  Method of insulin delivery?: Insulin Pump  Type of Pump: omnipod 5  Does patient have back-up plan?: Yes  Any problems obtaining supplies?: No    Continuous Glucose Monitoring  Patient has CGM: Yes  Personal CGM type:: Dexcom  GMI Date: 01/07/25  GMI Value: 6.2 %    Lab Results   Component Value Date    LABA1C 7.7 (H) 02/28/2018    HGBA1C 6.5 (H) 10/25/2024     A1c Pre Diabetes Care Specialist Intervention:  7.7%    Physical activity/Exercise:   No change, active at work and around the house, no formal physical activity at this time    SMBG: patient is using the dexcom G6                Lifestyle Coping Support & Clinical    Lifestyle/Coping/Support  Compared to other people your age, how would you rate your health?: Good  Psychosocial/Coping Skills Assessment Completed: : No  Assessment indicates:: Adequate understanding  Deffered due to:: Other (comment) (previously completed, there has been no change and patient has adequate understanding)  Area of need?: No    Problem Review  Active Comorbidities: Other (comment), Hypertension, Hyperlipidemia/Dyslipidemia, Obesity, Gastrointestinal Disorder (Asthma)    Diabetes Self-Management Skills Assessment    Medication Skills Assessment  Patient is able to identify current diabetes medications, dosages, and appropriate timing of medications.:  yes  Patient reports problems or concerns with current medication regimen.: yes  Medication regimen problems/concerns:: other (see comments) (will need instruction on the omnipod 5)  Patient is  aware that some diabetes medications can cause low blood sugar?: Yes  Medication Skills Assessment Completed:: Yes  Assessment indicates:: Instruction Needed, Adequate understanding  Area of need?: Yes    Diabetes Disease Process/Treatment Options  Diabetes Type?: Type II  Diabetes Disease Process/Treatment Options: Skills Assessment Completed: No  Assessment indicates:: Adequate understanding  Deferred due to:: Other (comment) (previously completed, there has been no change and patient has adequate understanding)  Area of need?: No    Nutrition/Healthy Eating  Meal Plan 24 Hour Recall - Breakfast: toast or bagel and coffee- with sweet and low and SF creamer  Meal Plan 24 Hour Recall - Lunch: sister in law cooks every day  Meal Plan 24 Hour Recall - Dinner: home cooked  Meal Plan 24 Hour Recall - Snack: peanuts and candy sometimes  Meal Plan 24 Hour Recall - Beverage: water adn sugar free beverages  Who shops/cooks?: sister-in-law  Patient can identify foods that impact blood sugar.: yes  Challenges to healthy eating:: portion control, snacking between meals and at night  Nutrition/Healthy Eating Skills Assessment Completed:: No  Assessment indicates:: Adequate understanding  Deffered due to:: Other (comment) (previously completed, there has been no change and patient has adequate understanding)  Area of need?: No    Physical Activity/Exercise  Physical Activity/Exercise Skills Assessment Completed: : No  Assessment indicates:: Adequate understanding  Deffered due to:: Other (comment) (previously completed, there has been no change and patient has adequate understanding)  Area of need?: No    Home Blood Glucose Monitoring  Patient states that blood sugar is checked at home daily.: yes  Monitoring Method:: personal continuous  glucose monitor  Personal CGM type:: Dexcom   What is your current Time in Range?: 99%  What is your A1c Target?: 7.0 without hypoglycemia  Home Blood Glucose Monitoring Skills Assessment Completed: : Yes  Assessment indicates:: Adequate understanding  Deferred due to:: Other (comment)  Area of need?: No    Acute Complications  Acute Complications Skills Assessment Completed: : No  Assessment indicates:: Adequate understanding  Deffered due to:: Other (comment) (previously completed, there has been no change and patient has adequate understanding)  Area of need?: No    Chronic Complications  Reviewed health maintenance: yes  Chronic Complications Skills Assessment Completed: : No  Assessment indicates:: Adequate understanding  Deferred due to:: Other (comment) (previously completed, there has been no change and patient has adequate understanding)  Area of need?: No      During today's follow-up visit,  the following areas required further assessment and content was provided/reviewed.    Based on today's diabetes care assessment, the following areas of need were identified:      Identified Areas of Need      Medication/Current Diabetes Treatment: Yes deleted and reinstalled omnipod 5 ana, patient currently has an active pod with the . Patient will start new pod in 3 days using the ana. Dexcom transmitter number added to the omnipod ana already.    CURRENT DIABETES MEDICATIONS:   Metformin  mg BID  Mounjaro 15 mg weekly on Thursdays - no GI upset      Insulin Pump: Omnipod 5 with Fiasp   Pump settings:  Basal:0.5 units/hr   ICR:1:10 (enter 40-50 grams of carbohydrates to provide 4-5 units with meals)   ISF: 1:50  Target: 110   IOB: 4 hours    Lifestyle Coping/Support: No   Diabetes Disease Process/Treatment Options: No   Nutrition/Healthy Eating: No    Physical Activity/Exercise: No    Home Blood Glucose Monitoring: No    Acute Complications: No    Chronic Complications: No       Today's interventions  were provided through individual discussion, instruction, and written materials were provided.    Patient verbalized understanding of instruction and written materials.  Pt was able to return back demonstration of instructions today. Patient understood key points, needs reinforcement and further instruction.     Diabetes Self-Management Care Plan Review and Evaluation of Progress:    During today's follow-up Ivonne Diabetes Self-Management Care Plan progress was reviewed and progress was evaluated including his/her input. Skip has agreed to continue his/her journey to improve/maintain overall diabetes control by continuing to set health goals. See care plan progress below.      Care Plan: Diabetes Management   Updates made since 1/8/2024 12:00 AM        Problem: Healthy Eating Resolved 9/17/2024        Long-Range Goal: Eat 3 meals daily with 30-45g/2-3 servings of Carbohydrate per meal. Completed 9/17/2024   Start Date: 3/3/2021   Expected End Date: 9/17/2024   This Visit's Progress: Met   Recent Progress: On track   Priority: Medium   Barriers: No Barriers Identified   Note:    Plan is to review carb counting, portion control, importance of spacing meals throughout the day to prevent post prandial elevations.  Recommended low saturated fat, low sodium diet to aid in control of hypertension and cholesterol. Not completed 3/3 due to time       Task: Reviewed the sources and role of Carbohydrate, Protein, and Fat and how each nutrient impacts blood sugar. Completed 3/18/2024        Task: Provided visual examples using dry measuring cups, food models, and other familiar objects such as computer mouse, deck or cards, tennis ball etc. to help with visualization of portions. Completed 5/13/2024        Task: Explained how to count carbohydrates using the food label and the use of dry measuring cups for accurate carb counting. Completed 4/5/2024        Task: Discussed strategies for choosing healthier menu options when  dining out. Completed 5/13/2024        Task: Review the importance of balancing carbohydrates with each meal using portion control techniques to count servings of carbohydrate and label reading to identify serving size and amount of total carbs per serving. Completed 5/13/2024        Task: Provided Sample plate method and reviewed the use of the plate to estimate amounts of carbohydrate per meal. Completed 5/13/2024        Problem: Medications Resolved 9/17/2024   Note:    Plan to switch to the omnipod 5 from the VGO       Problem: Acute Complications Resolved 3/14/2024        Problem: Chronic Complications Resolved 4/5/2024        Goal: Patient agrees to have the following diabetes jessica maintenance screenings/appointments eye exam completed in the next 6 months. Completed 4/5/2024   Start Date: 3/3/2021   This Visit's Progress: Met   Recent Progress: On track   Priority: Low   Barriers: No Barriers Identified   Note:    ABC's of Diabetes   Discussed imporance of A1c less than 7 to reduce risk of micro and macro complications, controlled Blood Pressure 130/80 and Cholesterol Lab Values--Total Cholesterol <200mg, LDL < 100, HDL >45 men and >50 women, Triglycerides <150mg for prevention heart disease, heart attach, stroke. Diabetes Care Schedule   A1c every 3 to 6 months  Lipid Panel every year  Microalbumin (urine test) once per year  Comprehensive Foot Exam once per year  Dilated Eye Exam at least once per year  Dental exam every 6 months  Flu Vaccination yearly   Shingles and Phneumoia Vaccination as recommended by physician         Task: Discussed importance of annual microalbumin urine test to monitor kidney function. Completed 3/18/2024        Task: Discussed importance of annual dilated eye exam for prevention of retinopathy. Completed 3/18/2024        Task: Discussed the importance of routine dental exams for the prevention of gum disease and gingivitis and encouraged dental exam every 6 months. Completed  3/18/2024        Task: Instructed on basic daily foot care and encouraged inspecting feet daily. Completed 3/18/2024        Task: Discussed importance of the Flu and Pneumonia Vaccination. Completed 3/18/2024          Follow Up Plan     Follow up in about 3 months (around 4/7/2025) for General Follow-up, Insulin Pump Upload, Personal CGM Upload.    Today's care plan and follow up schedule was discussed with patient.  Skye verbalized understanding of the care plan, goals, and agrees to follow up plan.        The patient was encouraged to communicate with his/her health care provider/physician and care team regarding his/her condition(s) and treatment.  I provided the patient with my contact information today and encouraged to contact me via phone or [facility] Patient Portal as needed.     Length of Visit   Total Time: 40 Minutes

## 2025-01-09 DIAGNOSIS — E11.65 TYPE 2 DIABETES MELLITUS WITH HYPERGLYCEMIA, WITH LONG-TERM CURRENT USE OF INSULIN: ICD-10-CM

## 2025-01-09 DIAGNOSIS — Z79.4 TYPE 2 DIABETES MELLITUS WITH HYPERGLYCEMIA, WITH LONG-TERM CURRENT USE OF INSULIN: ICD-10-CM

## 2025-01-09 RX ORDER — INSULIN ASPART INJECTION 100 [IU]/ML
INJECTION, SOLUTION SUBCUTANEOUS
Qty: 30 ML | Refills: 6 | Status: SHIPPED | OUTPATIENT
Start: 2025-01-09

## 2025-01-16 ENCOUNTER — CLINICAL SUPPORT (OUTPATIENT)
Dept: DIABETES | Facility: CLINIC | Age: 75
End: 2025-01-16
Payer: MEDICARE

## 2025-01-16 DIAGNOSIS — E11.65 CONTROLLED TYPE 2 DIABETES MELLITUS WITH HYPERGLYCEMIA, WITHOUT LONG-TERM CURRENT USE OF INSULIN: Primary | ICD-10-CM

## 2025-01-17 ENCOUNTER — NUTRITION (OUTPATIENT)
Dept: DIABETES | Facility: CLINIC | Age: 75
End: 2025-01-17
Payer: MEDICARE

## 2025-01-17 DIAGNOSIS — E11.65 TYPE 2 DIABETES MELLITUS WITH HYPERGLYCEMIA, WITH LONG-TERM CURRENT USE OF INSULIN: Primary | ICD-10-CM

## 2025-01-17 DIAGNOSIS — Z79.4 TYPE 2 DIABETES MELLITUS WITH HYPERGLYCEMIA, WITH LONG-TERM CURRENT USE OF INSULIN: Primary | ICD-10-CM

## 2025-01-17 PROCEDURE — 99999PBSHW PR PBB SHADOW TECHNICAL ONLY FILED TO HB: Mod: PBBFAC,,,

## 2025-01-17 PROCEDURE — 99999 PR PBB SHADOW E&M-EST. PATIENT-LVL I: CPT | Mod: PBBFAC,,, | Performed by: DIETITIAN, REGISTERED

## 2025-01-17 PROCEDURE — G0108 DIAB MANAGE TRN  PER INDIV: HCPCS | Mod: PBBFAC,PN | Performed by: DIETITIAN, REGISTERED

## 2025-01-17 PROCEDURE — 99211 OFF/OP EST MAY X REQ PHY/QHP: CPT | Mod: PBBFAC,PN | Performed by: DIETITIAN, REGISTERED

## 2025-01-17 NOTE — PROGRESS NOTES
Diabetes Care Specialist Follow-up Note  Author: Alisa Daley RD, CDE  Date: 1/17/2025    Intake    Program Intake  Reason for Diabetes Program Visit:: Intervention  Type of Intervention:: Individual  Individual: Device Training  Device Training: Other (going back to the )  Current diabetes risk level:: low  In the last month, have you used the ER or been admitted to the hospital: No  Permission to speak with others about care:: no    Current Diabetes Treatment: Insulin, Oral Medications, DM Injectables  Oral Medication Type/Dose: metformin ER 500mg Twice a day  DM Injectables Type/Dose: tirzepatide 15mg/0.5ml once a week  Method of insulin delivery?: Insulin Pump  Type of Pump: omnipod 5  Does patient have back-up plan?: Yes  Any problems obtaining supplies?: No    Continuous Glucose Monitoring  Patient has CGM: Yes  Personal CGM type:: Dexcom  GMI Date: 01/07/25  GMI Value: 6.2 %    Lab Results   Component Value Date    LABA1C 7.7 (H) 02/28/2018    HGBA1C 6.5 (H) 10/25/2024     A1c Pre Diabetes Care Specialist Intervention:  7.7%    Physical activity/Exercise:   No change, active at work and around the house, no formal physical activity at this time    SMBG: patient is using the dexcom G6  Patient is not able to use the bolus function on the omnipod 5 ana.  When pressing the bolus feature she gets kicked out or the ana freezes.   Same thing is happening when she goes to activate a pod.  We deleted and re-installed the ana multiple times and the patient even bought a new phone.  The glitch is continuing with the new phone as well.                  Lifestyle Coping Support & Clinical    Lifestyle/Coping/Support  Compared to other people your age, how would you rate your health?: Good  Psychosocial/Coping Skills Assessment Completed: : No  Assessment indicates:: Adequate understanding  Deffered due to:: Other (comment) (previously completed, there has been no change and patient has adequate  understanding)  Area of need?: No    Problem Review  Active Comorbidities: Other (comment), Hypertension, Hyperlipidemia/Dyslipidemia, Obesity, Gastrointestinal Disorder (Asthma)    Diabetes Self-Management Skills Assessment    Medication Skills Assessment  Patient is able to identify current diabetes medications, dosages, and appropriate timing of medications.: yes  Patient reports problems or concerns with current medication regimen.: yes  Medication regimen problems/concerns:: other (see comments) (will need instruction on the omnipod 5)  Patient is  aware that some diabetes medications can cause low blood sugar?: Yes  Medication Skills Assessment Completed:: Yes  Assessment indicates:: Instruction Needed, Adequate understanding  Area of need?: Yes    Diabetes Disease Process/Treatment Options  Diabetes Type?: Type II  Diabetes Disease Process/Treatment Options: Skills Assessment Completed: No  Assessment indicates:: Adequate understanding  Deferred due to:: Other (comment) (previously completed, there has been no change and patient has adequate understanding)  Area of need?: No    Nutrition/Healthy Eating  Meal Plan 24 Hour Recall - Breakfast: toast or bagel and coffee- with sweet and low and SF creamer  Meal Plan 24 Hour Recall - Lunch: sister in law cooks every day  Meal Plan 24 Hour Recall - Dinner: home cooked  Meal Plan 24 Hour Recall - Snack: peanuts and candy sometimes  Patient can identify foods that impact blood sugar.: yes  Challenges to healthy eating:: portion control, snacking between meals and at night  Nutrition/Healthy Eating Skills Assessment Completed:: No  Assessment indicates:: Adequate understanding  Deffered due to:: Other (comment) (previously completed, there has been no change and patient has adequate understanding)  Area of need?: No    Physical Activity/Exercise  Assessment indicates:: Adequate understanding  Deffered due to:: Other (comment) (previously completed, there has been no  change and patient has adequate understanding)  Area of need?: No    Home Blood Glucose Monitoring  Patient states that blood sugar is checked at home daily.: yes  Monitoring Method:: personal continuous glucose monitor  Personal CGM type:: Dexcom   What is your current Time in Range?: 99%  What is your A1c Target?: 7.0 without hypoglycemia  Home Blood Glucose Monitoring Skills Assessment Completed: : No  Assessment indicates:: Adequate understanding  Deferred due to:: Other (comment) (previously completed, there has been no change and patient has adequate understanding)  Area of need?: No    Acute Complications  Acute Complications Skills Assessment Completed: : No  Assessment indicates:: Adequate understanding  Deffered due to:: Other (comment) (previously completed, there has been no change and patient has adequate understanding)  Area of need?: No    Chronic Complications  Reviewed health maintenance: yes  Chronic Complications Skills Assessment Completed: : No  Assessment indicates:: Adequate understanding  Deferred due to:: Other (comment) (previously completed, there has been no change and patient has adequate understanding)  Area of need?: No      During today's follow-up visit,  the following areas required further assessment and content was provided/reviewed.    Based on today's diabetes care assessment, the following areas of need were identified:      Identified Areas of Need      Medication/Current Diabetes Treatment: Yes Restarted the omnipod 5 using the omnipod 5  rather than the iphone. Patient was able to successfully start the pod at time of visit.  Patient instructed to reach out to omnipod to report issues with the ana and get replacements for the pods wasted    CURRENT DIABETES MEDICATIONS:   Metformin  mg BID  Mounjaro 15 mg weekly on Thursdays - no GI upset      Insulin Pump: Omnipod 5 with Fiasp   Pump settings:  Basal:0.5 units/hr   ICR:1:10 (enter 40-50 grams of carbohydrates  to provide 4-5 units with meals)   ISF: 1:50  Target: 110   IOB: 4 hours     Comparison of previous CGM data 1/7/2025 to current    Average Glucose 110 improved from 122  Standard Deviation 17 decreased from 19  GMI N/A from 6.2 %    Time in Range  0% of time patient was in Very High Range no change from 0%  0% of time patient was in High Range improved from 1%  100% of time patient was in Range improved from 99%  0% of time patient was in Low Range no change from 0%  0% of time patient was in Very Low Range no change from 0%     Lifestyle Coping/Support: No   Diabetes Disease Process/Treatment Options: No   Nutrition/Healthy Eating: No    Physical Activity/Exercise: No    Home Blood Glucose Monitoring: No    Acute Complications: No    Chronic Complications: No       Today's interventions were provided through individual discussion, instruction, and written materials were provided.    Patient verbalized understanding of instruction and written materials.  Pt was able to return back demonstration of instructions today. Patient understood key points, needs reinforcement and further instruction.     Diabetes Self-Management Care Plan Review and Evaluation of Progress:    During today's follow-up Skip's Diabetes Self-Management Care Plan progress was reviewed and progress was evaluated including his/her input. Skip has agreed to continue his/her journey to improve/maintain overall diabetes control by continuing to set health goals. See care plan progress below.      Care Plan: Diabetes Management   Updates made since 1/18/2024 12:00 AM        Problem: Healthy Eating Resolved 9/17/2024        Long-Range Goal: Eat 3 meals daily with 30-45g/2-3 servings of Carbohydrate per meal. Completed 9/17/2024   Start Date: 3/3/2021   Expected End Date: 9/17/2024   This Visit's Progress: Met   Recent Progress: On track   Priority: Medium   Barriers: No Barriers Identified   Note:    Plan is to review carb counting, portion control,  importance of spacing meals throughout the day to prevent post prandial elevations.  Recommended low saturated fat, low sodium diet to aid in control of hypertension and cholesterol. Not completed 3/3 due to time       Task: Reviewed the sources and role of Carbohydrate, Protein, and Fat and how each nutrient impacts blood sugar. Completed 3/18/2024        Task: Provided visual examples using dry measuring cups, food models, and other familiar objects such as computer mouse, deck or cards, tennis ball etc. to help with visualization of portions. Completed 5/13/2024        Task: Explained how to count carbohydrates using the food label and the use of dry measuring cups for accurate carb counting. Completed 4/5/2024        Task: Discussed strategies for choosing healthier menu options when dining out. Completed 5/13/2024        Task: Review the importance of balancing carbohydrates with each meal using portion control techniques to count servings of carbohydrate and label reading to identify serving size and amount of total carbs per serving. Completed 5/13/2024        Task: Provided Sample plate method and reviewed the use of the plate to estimate amounts of carbohydrate per meal. Completed 5/13/2024        Problem: Medications Resolved 9/17/2024   Note:    Plan to switch to the omnipod 5 from the VGO       Problem: Acute Complications Resolved 3/14/2024        Problem: Chronic Complications Resolved 4/5/2024        Goal: Patient agrees to have the following diabetes jessica maintenance screenings/appointments eye exam completed in the next 6 months. Completed 4/5/2024   Start Date: 3/3/2021   This Visit's Progress: Met   Recent Progress: On track   Priority: Low   Barriers: No Barriers Identified   Note:    ABC's of Diabetes   Discussed imporance of A1c less than 7 to reduce risk of micro and macro complications, controlled Blood Pressure 130/80 and Cholesterol Lab Values--Total Cholesterol <200mg, LDL < 100, HDL >45  men and >50 women, Triglycerides <150mg for prevention heart disease, heart attach, stroke. Diabetes Care Schedule   A1c every 3 to 6 months  Lipid Panel every year  Microalbumin (urine test) once per year  Comprehensive Foot Exam once per year  Dilated Eye Exam at least once per year  Dental exam every 6 months  Flu Vaccination yearly   Shingles and Phneumoia Vaccination as recommended by physician         Task: Discussed importance of annual microalbumin urine test to monitor kidney function. Completed 3/18/2024        Task: Discussed importance of annual dilated eye exam for prevention of retinopathy. Completed 3/18/2024        Task: Discussed the importance of routine dental exams for the prevention of gum disease and gingivitis and encouraged dental exam every 6 months. Completed 3/18/2024        Task: Instructed on basic daily foot care and encouraged inspecting feet daily. Completed 3/18/2024        Task: Discussed importance of the Flu and Pneumonia Vaccination. Completed 3/18/2024        Problem: Medications         Goal: Patient will use the  to work the omnipod 5, and will reach out if she has an issue in the next 2 weeks    Start Date: 1/17/2025   Expected End Date: 1/31/2025   This Visit's Progress: Deferred   Priority: High   Barriers: No Barriers Identified        Task: patient started a pod in office with the omnipod 5  and added the dexcom G6 successfully Completed 1/17/2025        Task: Setup omnipod 5  Completed 1/17/2025        Task: Omnipod and Dexcom data was downloaded and reviewed with patient and provider, any necessary adjustments were made         Task: Omnipod and Dexcom data was downloaded and reviewed with patient and provider, any necessary adjustments were made           Follow Up Plan     Follow up in about 2 weeks (around 1/31/2025) for Insulin Pump Upload, Personal CGM Upload.    Today's care plan and follow up schedule was discussed with patient.  Skip  verbalized understanding of the care plan, goals, and agrees to follow up plan.        The patient was encouraged to communicate with his/her health care provider/physician and care team regarding his/her condition(s) and treatment.  I provided the patient with my contact information today and encouraged to contact me via phone or [facility]'s Patient Portal as needed.     Length of Visit   Total Time: 30 Minutes

## 2025-01-23 ENCOUNTER — PATIENT OUTREACH (OUTPATIENT)
Dept: DIABETES | Facility: CLINIC | Age: 75
End: 2025-01-23
Payer: COMMERCIAL

## 2025-01-24 ENCOUNTER — NUTRITION (OUTPATIENT)
Dept: DIABETES | Facility: CLINIC | Age: 75
End: 2025-01-24
Payer: MEDICARE

## 2025-01-24 DIAGNOSIS — E11.65 TYPE 2 DIABETES MELLITUS WITH HYPERGLYCEMIA, WITH LONG-TERM CURRENT USE OF INSULIN: Primary | ICD-10-CM

## 2025-01-24 DIAGNOSIS — Z79.4 TYPE 2 DIABETES MELLITUS WITH HYPERGLYCEMIA, WITH LONG-TERM CURRENT USE OF INSULIN: Primary | ICD-10-CM

## 2025-01-24 PROCEDURE — 99999PBSHW PR PBB SHADOW TECHNICAL ONLY FILED TO HB: Mod: PBBFAC,,,

## 2025-01-24 PROCEDURE — G0108 DIAB MANAGE TRN  PER INDIV: HCPCS | Mod: PBBFAC,PN | Performed by: DIETITIAN, REGISTERED

## 2025-01-24 NOTE — PROGRESS NOTES
Diabetes Care Specialist Progress Note  Author: Alisa Daley RD, CDE  Date: 1/23/2025    Intake    Program Intake  Reason for Diabetes Program Visit:: Intervention  Type of Intervention:: Individual  Individual: Device Training  Device Training: Other  Current diabetes risk level:: low  In the last month, have you used the ER or been admitted to the hospital: No  Permission to speak with others about care:: no    Current Diabetes Treatment: Insulin, Oral Medications, DM Injectables  Oral Medication Type/Dose: metformin ER 500mg Twice a day  DM Injectables Type/Dose: tirzepatide 15mg/0.5ml once a week  Method of insulin delivery?: Insulin Pump  Type of Pump: omnipod 5  Does patient have back-up plan?: Yes  Any problems obtaining supplies?: No    Continuous Glucose Monitoring  Patient has CGM: Yes  Personal CGM type:: Dexcom  GMI Date: 01/07/25  GMI Value: 6.2 %    Lab Results   Component Value Date    LABA1C 7.7 (H) 02/28/2018    HGBA1C 6.5 (H) 10/25/2024               There is no height or weight on file to calculate BMI.    Lifestyle Coping Support & Clinical    Lifestyle/Coping/Support  Psychosocial/Coping Skills Assessment Completed: : No  Assessment indicates:: Adequate understanding  Deffered due to:: Other (comment) (previously completed, there has been no change and patient has adequate understanding)  Area of need?: No    Problem Review  Active Comorbidities: Other (comment), Hypertension, Hyperlipidemia/Dyslipidemia, Obesity, Gastrointestinal Disorder (Asthma)    Diabetes Self-Management Skills Assessment    Medication Skills Assessment  Patient is able to identify current diabetes medications, dosages, and appropriate timing of medications.: yes  Patient reports problems or concerns with current medication regimen.: yes  Medication regimen problems/concerns:: other (see comments) (will need instruction on the omnipod 5)  Patient is  aware that some diabetes medications can cause low blood sugar?:  Yes  Medication Skills Assessment Completed:: Yes  Assessment indicates:: Instruction Needed, Adequate understanding  Area of need?: Yes    Diabetes Disease Process/Treatment Options  Diabetes Type?: Type II  Diabetes Disease Process/Treatment Options: Skills Assessment Completed: No  Assessment indicates:: Adequate understanding  Deferred due to:: Other (comment) (previously completed, there has been no change and patient has adequate understanding)  Area of need?: No    Nutrition/Healthy Eating  Meal Plan 24 Hour Recall - Breakfast: toast or bagel and coffee- with sweet and low and SF creamer  Meal Plan 24 Hour Recall - Lunch: sister in law cooks every day  Meal Plan 24 Hour Recall - Dinner: home cooked  Meal Plan 24 Hour Recall - Snack: peanuts and candy sometimes  Patient can identify foods that impact blood sugar.: yes  Challenges to healthy eating:: portion control, snacking between meals and at night  Nutrition/Healthy Eating Skills Assessment Completed:: No  Assessment indicates:: Adequate understanding  Deffered due to:: Other (comment) (previously completed, there has been no change and patient has adequate understanding)  Area of need?: No    Physical Activity/Exercise  Physical Activity/Exercise Skills Assessment Completed: : No  Assessment indicates:: Adequate understanding  Deffered due to:: Other (comment) (previously completed, there has been no change and patient has adequate understanding)  Area of need?: No    Home Blood Glucose Monitoring  Patient states that blood sugar is checked at home daily.: yes  Monitoring Method:: personal continuous glucose monitor  Personal CGM type:: Dexcom   What is your current Time in Range?: 99%  What is your A1c Target?: 7.0 without hypoglycemia  Home Blood Glucose Monitoring Skills Assessment Completed: : No  Assessment indicates:: Adequate understanding  Deferred due to:: Other (comment) (previously completed, there has been no change and patient has adequate  understanding)  Area of need?: No    Acute Complications  Acute Complications Skills Assessment Completed: : No  Assessment indicates:: Adequate understanding  Deffered due to:: Other (comment) (previously completed, there has been no change and patient has adequate understanding)  Area of need?: No    Chronic Complications  Reviewed health maintenance: yes  Chronic Complications Skills Assessment Completed: : No  Assessment indicates:: Adequate understanding  Deferred due to:: Other (comment) (previously completed, there has been no change and patient has adequate understanding)  Area of need?: No      Assessment Summary and Plan    Based on today's diabetes care assessment, the following areas of need were identified:      Identified Areas of Need      Medication/Current Diabetes Treatment: Yes patient needs to setup ana on phone for the omnipod 5, unable to setup over the phone will come in tomorrow to set up   Lifestyle Coping/Support: No   Diabetes Disease Process/Treatment Options: No   Nutrition/Healthy Eating: No    Physical Activity/Exercise: No    Home Blood Glucose Monitoring: No    Acute Complications: No    Chronic Complications: No       Today's interventions were provided through individual discussion, instruction, and written materials were provided.      Patient verbalized understanding of instruction and written materials.  Pt was able to return back demonstration of instructions today. Patient understood key points, needs reinforcement and further instruction.     Diabetes Self-Management Care Plan:    Today's Diabetes Self-Management Care Plan was developed with Skip's input. Skip has agreed to work toward the following goal(s) to improve his/her overall diabetes control.      Care Plan: Diabetes Management   Updates made since 1/25/2024 12:00 AM        Problem: Healthy Eating Resolved 9/17/2024        Long-Range Goal: Eat 3 meals daily with 30-45g/2-3 servings of Carbohydrate per meal.  Completed 9/17/2024   Start Date: 3/3/2021   Expected End Date: 9/17/2024   This Visit's Progress: Met   Recent Progress: On track   Priority: Medium   Barriers: No Barriers Identified   Note:    Plan is to review carb counting, portion control, importance of spacing meals throughout the day to prevent post prandial elevations.  Recommended low saturated fat, low sodium diet to aid in control of hypertension and cholesterol. Not completed 3/3 due to time       Task: Reviewed the sources and role of Carbohydrate, Protein, and Fat and how each nutrient impacts blood sugar. Completed 3/18/2024        Task: Provided visual examples using dry measuring cups, food models, and other familiar objects such as computer mouse, deck or cards, tennis ball etc. to help with visualization of portions. Completed 5/13/2024        Task: Explained how to count carbohydrates using the food label and the use of dry measuring cups for accurate carb counting. Completed 4/5/2024        Task: Discussed strategies for choosing healthier menu options when dining out. Completed 5/13/2024        Task: Review the importance of balancing carbohydrates with each meal using portion control techniques to count servings of carbohydrate and label reading to identify serving size and amount of total carbs per serving. Completed 5/13/2024        Task: Provided Sample plate method and reviewed the use of the plate to estimate amounts of carbohydrate per meal. Completed 5/13/2024        Problem: Medications Resolved 9/17/2024   Note:    Plan to switch to the omnipod 5 from the VGO       Problem: Acute Complications Resolved 3/14/2024        Problem: Chronic Complications Resolved 4/5/2024        Goal: Patient agrees to have the following diabetes jessica maintenance screenings/appointments eye exam completed in the next 6 months. Completed 4/5/2024   Start Date: 3/3/2021   This Visit's Progress: Met   Recent Progress: On track   Priority: Low   Barriers: No  Barriers Identified   Note:    ABC's of Diabetes   Discussed imporance of A1c less than 7 to reduce risk of micro and macro complications, controlled Blood Pressure 130/80 and Cholesterol Lab Values--Total Cholesterol <200mg, LDL < 100, HDL >45 men and >50 women, Triglycerides <150mg for prevention heart disease, heart attach, stroke. Diabetes Care Schedule   A1c every 3 to 6 months  Lipid Panel every year  Microalbumin (urine test) once per year  Comprehensive Foot Exam once per year  Dilated Eye Exam at least once per year  Dental exam every 6 months  Flu Vaccination yearly   Shingles and Phneumoia Vaccination as recommended by physician         Task: Discussed importance of annual microalbumin urine test to monitor kidney function. Completed 3/18/2024        Task: Discussed importance of annual dilated eye exam for prevention of retinopathy. Completed 3/18/2024        Task: Discussed the importance of routine dental exams for the prevention of gum disease and gingivitis and encouraged dental exam every 6 months. Completed 3/18/2024        Task: Instructed on basic daily foot care and encouraged inspecting feet daily. Completed 3/18/2024        Task: Discussed importance of the Flu and Pneumonia Vaccination. Completed 3/18/2024        Problem: Medications         Goal: Patient will use the  to work the omnipod 5, and will reach out if she has an issue in the next 2 weeks    Start Date: 1/17/2025   Expected End Date: 1/31/2025   This Visit's Progress: Deferred   Priority: High   Barriers: No Barriers Identified        Task: patient started a pod in office with the omnipod 5  and added the dexcom G6 successfully Completed 1/17/2025        Task: Setup omnipod 5  Completed 1/17/2025        Task: Omnipod and Dexcom data was downloaded and reviewed with patient and provider, any necessary adjustments were made         Task: Omnipod and Dexcom data was downloaded and reviewed with patient and  provider, any necessary adjustments were made           Follow Up Plan     Follow up in about 1 day (around 1/24/2025) for Insulin Pump Upload, Insulin Pump Start, Personal CGM Upload.    Today's care plan and follow up schedule was discussed with patient.  Skye verbalized understanding of the care plan, goals, and agrees to follow up plan.        The patient was encouraged to communicate with his/her health care provider/physician and care team regarding his/her condition(s) and treatment.  I provided the patient with my contact information today and encouraged to contact me via phone or [facility] Patient Portal as needed.     Length of Visit   Total Time: 25 Minutes

## 2025-01-24 NOTE — PROGRESS NOTES
Diabetes Care Specialist Follow-up Note  Author: Alisa Daley RD, CDE  Date: 1/24/2025    Intake    Program Intake  Reason for Diabetes Program Visit:: Intervention  Type of Intervention:: Individual  Individual: Device Training  Device Training: Other  Current diabetes risk level:: low  In the last month, have you used the ER or been admitted to the hospital: No  Permission to speak with others about care:: no    Current Diabetes Treatment: Insulin, Oral Medications, DM Injectables  Oral Medication Type/Dose: metformin ER 500mg Twice a day  DM Injectables Type/Dose: tirzepatide 15mg/0.5ml once a week  Method of insulin delivery?: Insulin Pump  Type of Pump: omnipod 5  Does patient have back-up plan?: Yes  Any problems obtaining supplies?: No    Continuous Glucose Monitoring  Patient has CGM: Yes  Personal CGM type:: Dexcom  GMI Date: 01/07/25  GMI Value: 6.2 %    Lab Results   Component Value Date    LABA1C 7.7 (H) 02/28/2018    HGBA1C 6.5 (H) 10/25/2024     A1c Pre Diabetes Care Specialist Intervention:  7.7%    Physical activity/Exercise:   No change    SMBG: using the dexcom      Lifestyle Coping Support & Clinical    Lifestyle/Coping/Support  Compared to other people your age, how would you rate your health?: Good  Psychosocial/Coping Skills Assessment Completed: : No  Assessment indicates:: Adequate understanding  Deffered due to:: Other (comment) (previously completed, there has been no change and patient has adequate understanding)  Area of need?: No    Problem Review  Active Comorbidities: Other (comment), Hypertension, Hyperlipidemia/Dyslipidemia, Obesity, Gastrointestinal Disorder (Asthma)    Diabetes Self-Management Skills Assessment    Medication Skills Assessment  Patient is able to identify current diabetes medications, dosages, and appropriate timing of medications.: yes  Patient reports problems or concerns with current medication regimen.: yes  Medication regimen problems/concerns:: other (see  comments) (will need instruction on the omnipod 5)  Patient is  aware that some diabetes medications can cause low blood sugar?: Yes  Medication Skills Assessment Completed:: Yes  Assessment indicates:: Instruction Needed, Adequate understanding  Area of need?: Yes    Diabetes Disease Process/Treatment Options  Diabetes Type?: Type II  Diabetes Disease Process/Treatment Options: Skills Assessment Completed: No  Assessment indicates:: Adequate understanding  Deferred due to:: Other (comment) (previously completed, there has been no change and patient has adequate understanding)  Area of need?: No    Nutrition/Healthy Eating  Meal Plan 24 Hour Recall - Breakfast: toast or bagel and coffee- with sweet and low and SF creamer  Meal Plan 24 Hour Recall - Lunch: sister in law cooks every day  Meal Plan 24 Hour Recall - Dinner: home cooked  Meal Plan 24 Hour Recall - Snack: peanuts and candy sometimes  Patient can identify foods that impact blood sugar.: yes  Challenges to healthy eating:: portion control, snacking between meals and at night  Nutrition/Healthy Eating Skills Assessment Completed:: No  Assessment indicates:: Adequate understanding  Deffered due to:: Other (comment) (previously completed, there has been no change and patient has adequate understanding)  Area of need?: No    Physical Activity/Exercise  Physical Activity/Exercise Skills Assessment Completed: : No  Assessment indicates:: Adequate understanding  Deffered due to:: Other (comment) (previously completed, there has been no change and patient has adequate understanding)  Area of need?: No    Home Blood Glucose Monitoring  Patient states that blood sugar is checked at home daily.: yes  Monitoring Method:: personal continuous glucose monitor  Personal CGM type:: Dexcom   What is your current Time in Range?: 99%  What is your A1c Target?: 7.0 without hypoglycemia  Home Blood Glucose Monitoring Skills Assessment Completed: : Yes  Assessment indicates::  Adequate understanding  Deferred due to:: Other (comment) (previously completed, there has been no change and patient has adequate understanding)  Area of need?: Yes    Acute Complications  Acute Complications Skills Assessment Completed: : No  Assessment indicates:: Adequate understanding  Deffered due to:: Other (comment) (previously completed, there has been no change and patient has adequate understanding)  Area of need?: No    Chronic Complications  Reviewed health maintenance: yes  Chronic Complications Skills Assessment Completed: : No  Assessment indicates:: Adequate understanding  Deferred due to:: Other (comment) (previously completed, there has been no change and patient has adequate understanding)  Area of need?: No      During today's follow-up visit,  the following areas required further assessment and content was provided/reviewed.    Based on today's diabetes care assessment, the following areas of need were identified:      Identified Areas of Need      Medication/Current Diabetes Treatment: Yes, restarting the omnipod 5 using the ana, setting up the ana on new Iphone. Patient is wanting to try the omnipod 5 ana again  Patient was given the dash pods instead of the omnipod 5 pods by the pharmacy. She has been without insulin for the past week   Lifestyle Coping/Support: No   Diabetes Disease Process/Treatment Options: No   Nutrition/Healthy Eating: No    Physical Activity/Exercise: No    Home Blood Glucose Monitoring: Yes, restarting the dexcom G6 on new phone  Patient is waiting on her dexcom supplies, restarting with a sample from the office and adding transmitter into omnipod 5 ana   Acute Complications: No    Chronic Complications: No       Today's interventions were provided through individual discussion, instruction, and written materials were provided.    Patient verbalized understanding of instruction and written materials.  Pt was able to return back demonstration of instructions today.  Patient understood key points, needs reinforcement and further instruction.     Diabetes Self-Management Care Plan Review and Evaluation of Progress:    During today's follow-up Skip's Diabetes Self-Management Care Plan progress was reviewed and progress was evaluated including his/her input. Skip has agreed to continue his/her journey to improve/maintain overall diabetes control by continuing to set health goals. See care plan progress below.      Care Plan: Diabetes Management   Updates made since 1/25/2024 12:00 AM        Problem: Healthy Eating Resolved 9/17/2024        Long-Range Goal: Eat 3 meals daily with 30-45g/2-3 servings of Carbohydrate per meal. Completed 9/17/2024   Start Date: 3/3/2021   Expected End Date: 9/17/2024   This Visit's Progress: Met   Recent Progress: On track   Priority: Medium   Barriers: No Barriers Identified   Note:    Plan is to review carb counting, portion control, importance of spacing meals throughout the day to prevent post prandial elevations.  Recommended low saturated fat, low sodium diet to aid in control of hypertension and cholesterol. Not completed 3/3 due to time       Task: Reviewed the sources and role of Carbohydrate, Protein, and Fat and how each nutrient impacts blood sugar. Completed 3/18/2024        Task: Provided visual examples using dry measuring cups, food models, and other familiar objects such as computer mouse, deck or cards, tennis ball etc. to help with visualization of portions. Completed 5/13/2024        Task: Explained how to count carbohydrates using the food label and the use of dry measuring cups for accurate carb counting. Completed 4/5/2024        Task: Discussed strategies for choosing healthier menu options when dining out. Completed 5/13/2024        Task: Review the importance of balancing carbohydrates with each meal using portion control techniques to count servings of carbohydrate and label reading to identify serving size and amount of  total carbs per serving. Completed 5/13/2024        Task: Provided Sample plate method and reviewed the use of the plate to estimate amounts of carbohydrate per meal. Completed 5/13/2024        Problem: Medications Resolved 9/17/2024   Note:    Plan to switch to the omnipod 5 from the VGO       Problem: Acute Complications Resolved 3/14/2024        Problem: Chronic Complications Resolved 4/5/2024        Goal: Patient agrees to have the following diabetes jessica maintenance screenings/appointments eye exam completed in the next 6 months. Completed 4/5/2024   Start Date: 3/3/2021   This Visit's Progress: Met   Recent Progress: On track   Priority: Low   Barriers: No Barriers Identified   Note:    ABC's of Diabetes   Discussed imporance of A1c less than 7 to reduce risk of micro and macro complications, controlled Blood Pressure 130/80 and Cholesterol Lab Values--Total Cholesterol <200mg, LDL < 100, HDL >45 men and >50 women, Triglycerides <150mg for prevention heart disease, heart attach, stroke. Diabetes Care Schedule   A1c every 3 to 6 months  Lipid Panel every year  Microalbumin (urine test) once per year  Comprehensive Foot Exam once per year  Dilated Eye Exam at least once per year  Dental exam every 6 months  Flu Vaccination yearly   Shingles and Phneumoia Vaccination as recommended by physician         Task: Discussed importance of annual microalbumin urine test to monitor kidney function. Completed 3/18/2024        Task: Discussed importance of annual dilated eye exam for prevention of retinopathy. Completed 3/18/2024        Task: Discussed the importance of routine dental exams for the prevention of gum disease and gingivitis and encouraged dental exam every 6 months. Completed 3/18/2024        Task: Instructed on basic daily foot care and encouraged inspecting feet daily. Completed 3/18/2024        Task: Discussed importance of the Flu and Pneumonia Vaccination. Completed 3/18/2024        Problem: Medications          Goal: Patient will use the  to work the omnipod 5, and will reach out if she has an issue in the next 2 weeks    Start Date: 1/17/2025   Expected End Date: 1/31/2025   This Visit's Progress: Not met   Recent Progress: Deferred   Priority: High   Barriers: Lack of Supplies        Task: patient started a pod in office with the omnipod 5  and added the dexcom G6 successfully Completed 1/17/2025        Task: Setup omnipod 5  Completed 1/17/2025        Task: Omnipod and Dexcom data was downloaded and reviewed with patient and provider, any necessary adjustments were made         Task: Omnipod and Dexcom data was downloaded and reviewed with patient and provider, any necessary adjustments were made           Follow Up Plan     Follow up in about 4 weeks (around 2/21/2025) for Insulin Pump Upload, Personal CGM Upload.    Today's care plan and follow up schedule was discussed with patient.  Loyce verbalized understanding of the care plan, goals, and agrees to follow up plan.        The patient was encouraged to communicate with his/her health care provider/physician and care team regarding his/her condition(s) and treatment.  I provided the patient with my contact information today and encouraged to contact me via phone or [facility]'s Patient Portal as needed.     Length of Visit   Total Time: 30 Minutes

## 2025-01-25 ENCOUNTER — CLINICAL SUPPORT (OUTPATIENT)
Dept: DIABETES | Facility: CLINIC | Age: 75
End: 2025-01-25
Payer: MEDICARE

## 2025-01-25 DIAGNOSIS — E11.65 TYPE 2 DIABETES MELLITUS WITH HYPERGLYCEMIA, WITH LONG-TERM CURRENT USE OF INSULIN: Primary | ICD-10-CM

## 2025-01-25 DIAGNOSIS — Z79.4 TYPE 2 DIABETES MELLITUS WITH HYPERGLYCEMIA, WITH LONG-TERM CURRENT USE OF INSULIN: Primary | ICD-10-CM

## 2025-01-25 PROCEDURE — 99999PBSHW PR PBB SHADOW TECHNICAL ONLY FILED TO HB: Mod: PBBFAC,,,

## 2025-01-25 PROCEDURE — G0108 DIAB MANAGE TRN  PER INDIV: HCPCS | Mod: PBBFAC,PN | Performed by: DIETITIAN, REGISTERED

## 2025-01-25 NOTE — PROGRESS NOTES
Diabetes Care Specialist Follow-up Note  Author: Alisa Daley RD, CDE  Date: 1/25/2025    Intake    Program Intake  Reason for Diabetes Program Visit:: Intervention  Type of Intervention:: Individual  Individual: Device Training  Current diabetes risk level:: low  In the last month, have you used the ER or been admitted to the hospital: No  Permission to speak with others about care:: no    Current Diabetes Treatment: Insulin, Oral Medications, DM Injectables  Oral Medication Type/Dose: metformin ER 500mg Twice a day  DM Injectables Type/Dose: tirzepatide 15mg/0.5ml once a week  Method of insulin delivery?: Insulin Pump  Type of Pump: omnipod 5  Does patient have back-up plan?: Yes  Any problems obtaining supplies?: No    Continuous Glucose Monitoring  Patient has CGM: Yes  Personal CGM type:: Dexcom  GMI Date: 01/07/25  GMI Value: 6.2 %    Lab Results   Component Value Date    LABA1C 7.7 (H) 02/28/2018    HGBA1C 6.5 (H) 10/25/2024     A1c Pre Diabetes Care Specialist Intervention:  7.7%    Physical activity/Exercise:   No change    SMBG: using the dexcom      Lifestyle Coping Support & Clinical    Lifestyle/Coping/Support  Compared to other people your age, how would you rate your health?: Good  Psychosocial/Coping Skills Assessment Completed: : No  Assessment indicates:: Adequate understanding  Deffered due to:: Other (comment) (previously completed, there has been no change and patient has adequate understanding)  Area of need?: No    Problem Review  Active Comorbidities: Other (comment), Hypertension, Hyperlipidemia/Dyslipidemia, Obesity, Gastrointestinal Disorder (Asthma)    Diabetes Self-Management Skills Assessment    Medication Skills Assessment  Patient is able to identify current diabetes medications, dosages, and appropriate timing of medications.: yes  Patient reports problems or concerns with current medication regimen.: yes  Medication regimen problems/concerns:: other (see comments) (will need  instruction on the omnipod 5)  Patient is  aware that some diabetes medications can cause low blood sugar?: Yes  Medication Skills Assessment Completed:: Yes  Assessment indicates:: Instruction Needed, Adequate understanding  Area of need?: Yes    Diabetes Disease Process/Treatment Options  Diabetes Type?: Type II  Diabetes Disease Process/Treatment Options: Skills Assessment Completed: No  Assessment indicates:: Adequate understanding  Deferred due to:: Other (comment) (previously completed, there has been no change and patient has adequate understanding)  Area of need?: No    Nutrition/Healthy Eating  Meal Plan 24 Hour Recall - Breakfast: toast or bagel and coffee- with sweet and low and SF creamer  Meal Plan 24 Hour Recall - Lunch: sister in law cooks every day  Meal Plan 24 Hour Recall - Dinner: home cooked  Meal Plan 24 Hour Recall - Snack: peanuts and candy sometimes  Patient can identify foods that impact blood sugar.: yes  Challenges to healthy eating:: portion control, snacking between meals and at night  Nutrition/Healthy Eating Skills Assessment Completed:: No  Assessment indicates:: Adequate understanding  Deffered due to:: Other (comment) (previously completed, there has been no change and patient has adequate understanding)  Area of need?: No    Physical Activity/Exercise  Physical Activity/Exercise Skills Assessment Completed: : No  Assessment indicates:: Adequate understanding  Deffered due to:: Other (comment) (previously completed, there has been no change and patient has adequate understanding)  Area of need?: No    Home Blood Glucose Monitoring  Patient states that blood sugar is checked at home daily.: yes  Monitoring Method:: personal continuous glucose monitor  Personal CGM type:: Dexcom   What is your current Time in Range?: 99%  What is your A1c Target?: 7.0 without hypoglycemia  Home Blood Glucose Monitoring Skills Assessment Completed: : Yes  Assessment indicates:: Adequate  understanding  Deferred due to:: Other (comment) (previously completed, there has been no change and patient has adequate understanding)  Area of need?: Yes    Acute Complications  Acute Complications Skills Assessment Completed: : No  Assessment indicates:: Adequate understanding  Deffered due to:: Other (comment) (previously completed, there has been no change and patient has adequate understanding)  Area of need?: No    Chronic Complications  Reviewed health maintenance: yes  Chronic Complications Skills Assessment Completed: : No  Assessment indicates:: Adequate understanding  Deferred due to:: Other (comment) (previously completed, there has been no change and patient has adequate understanding)  Area of need?: No      During today's follow-up visit,  the following areas required further assessment and content was provided/reviewed.    Based on today's diabetes care assessment, the following areas of need were identified:      Identified Areas of Need      Medication/Current Diabetes Treatment: Yes, yesterday we restarted the omnipod 5 using the ana, setting up the ana on new Iphone. Patient is wanting to try the omnipod 5 ana again  Patient left here and forgot that we setup the iphone and was trying to use the . Patient called last night to say the device was saying start new pod. She was instructed to come in today to troubleshoot.  During troubleshooting it was discovered she was trying to use the  instead of the ana  The ana is setup correctly, reviewed changing the pod using the ana and how to bolus using the ana. Looked at adding the sensor in ana as well.   Lifestyle Coping/Support: No   Diabetes Disease Process/Treatment Options: No   Nutrition/Healthy Eating: No    Physical Activity/Exercise: No    Home Blood Glucose Monitoring: Yes, restarted with a sample from the office yesterday and is working in the dexcom and omnipod apps   Acute Complications: No    Chronic Complications: No        Today's interventions were provided through individual discussion, instruction, and written materials were provided.    Patient verbalized understanding of instruction and written materials.  Pt was able to return back demonstration of instructions today. Patient understood key points, needs reinforcement and further instruction.     Diabetes Self-Management Care Plan Review and Evaluation of Progress:    During today's follow-up Ivonne Diabetes Self-Management Care Plan progress was reviewed and progress was evaluated including his/her input. Skip has agreed to continue his/her journey to improve/maintain overall diabetes control by continuing to set health goals. See care plan progress below.      Care Plan: Diabetes Management   Updates made since 1/26/2024 12:00 AM        Problem: Healthy Eating Resolved 9/17/2024        Long-Range Goal: Eat 3 meals daily with 30-45g/2-3 servings of Carbohydrate per meal. Completed 9/17/2024   Start Date: 3/3/2021   Expected End Date: 9/17/2024   This Visit's Progress: Met   Recent Progress: On track   Priority: Medium   Barriers: No Barriers Identified   Note:    Plan is to review carb counting, portion control, importance of spacing meals throughout the day to prevent post prandial elevations.  Recommended low saturated fat, low sodium diet to aid in control of hypertension and cholesterol. Not completed 3/3 due to time       Task: Reviewed the sources and role of Carbohydrate, Protein, and Fat and how each nutrient impacts blood sugar. Completed 3/18/2024        Task: Provided visual examples using dry measuring cups, food models, and other familiar objects such as computer mouse, deck or cards, tennis ball etc. to help with visualization of portions. Completed 5/13/2024        Task: Explained how to count carbohydrates using the food label and the use of dry measuring cups for accurate carb counting. Completed 4/5/2024        Task: Discussed strategies for choosing  healthier menu options when dining out. Completed 5/13/2024        Task: Review the importance of balancing carbohydrates with each meal using portion control techniques to count servings of carbohydrate and label reading to identify serving size and amount of total carbs per serving. Completed 5/13/2024        Task: Provided Sample plate method and reviewed the use of the plate to estimate amounts of carbohydrate per meal. Completed 5/13/2024        Problem: Medications Resolved 9/17/2024   Note:    Plan to switch to the omnipod 5 from the VGO       Problem: Acute Complications Resolved 3/14/2024        Problem: Chronic Complications Resolved 4/5/2024        Goal: Patient agrees to have the following diabetes jessica maintenance screenings/appointments eye exam completed in the next 6 months. Completed 4/5/2024   Start Date: 3/3/2021   This Visit's Progress: Met   Recent Progress: On track   Priority: Low   Barriers: No Barriers Identified   Note:    ABC's of Diabetes   Discussed imporance of A1c less than 7 to reduce risk of micro and macro complications, controlled Blood Pressure 130/80 and Cholesterol Lab Values--Total Cholesterol <200mg, LDL < 100, HDL >45 men and >50 women, Triglycerides <150mg for prevention heart disease, heart attach, stroke. Diabetes Care Schedule   A1c every 3 to 6 months  Lipid Panel every year  Microalbumin (urine test) once per year  Comprehensive Foot Exam once per year  Dilated Eye Exam at least once per year  Dental exam every 6 months  Flu Vaccination yearly   Shingles and Phneumoia Vaccination as recommended by physician         Task: Discussed importance of annual microalbumin urine test to monitor kidney function. Completed 3/18/2024        Task: Discussed importance of annual dilated eye exam for prevention of retinopathy. Completed 3/18/2024        Task: Discussed the importance of routine dental exams for the prevention of gum disease and gingivitis and encouraged dental exam  every 6 months. Completed 3/18/2024        Task: Instructed on basic daily foot care and encouraged inspecting feet daily. Completed 3/18/2024        Task: Discussed importance of the Flu and Pneumonia Vaccination. Completed 3/18/2024        Problem: Medications         Goal: Patient will use the  to work the omnipod 5, and will reach out if she has an issue in the next 2 weeks    Start Date: 1/17/2025   Expected End Date: 1/31/2025   This Visit's Progress: On track   Recent Progress: Not met   Priority: High   Barriers: Lack of Supplies        Task: patient started a pod in office with the omnipod 5  and added the dexcom G6 successfully Completed 1/17/2025        Task: Setup omnipod 5  Completed 1/17/2025        Task: Omnipod and Dexcom data was downloaded and reviewed with patient and provider, any necessary adjustments were made         Task: Omnipod and Dexcom data was downloaded and reviewed with patient and provider, any necessary adjustments were made           Follow Up Plan     Follow up in about 4 weeks (around 2/22/2025) for Insulin Pump Upload, Personal CGM Upload.    Today's care plan and follow up schedule was discussed with patient.  Skye verbalized understanding of the care plan, goals, and agrees to follow up plan.        The patient was encouraged to communicate with his/her health care provider/physician and care team regarding his/her condition(s) and treatment.  I provided the patient with my contact information today and encouraged to contact me via phone or [facility] Patient Portal as needed.     Length of Visit   Total Time: 35 Minutes

## 2025-02-05 ENCOUNTER — OFFICE VISIT (OUTPATIENT)
Dept: OPTOMETRY | Facility: CLINIC | Age: 75
End: 2025-02-05
Payer: MEDICARE

## 2025-02-05 DIAGNOSIS — E11.9 TYPE 2 DIABETES MELLITUS WITHOUT RETINOPATHY: Primary | ICD-10-CM

## 2025-02-05 DIAGNOSIS — Z13.5 GLAUCOMA SCREENING: ICD-10-CM

## 2025-02-05 DIAGNOSIS — H52.4 PRESBYOPIA: ICD-10-CM

## 2025-02-05 PROCEDURE — 99213 OFFICE O/P EST LOW 20 MIN: CPT | Mod: PBBFAC,PO | Performed by: OPTOMETRIST

## 2025-02-05 PROCEDURE — 99999 PR PBB SHADOW E&M-EST. PATIENT-LVL III: CPT | Mod: PBBFAC,,, | Performed by: OPTOMETRIST

## 2025-02-05 PROCEDURE — 92014 COMPRE OPH EXAM EST PT 1/>: CPT | Mod: S$PBB,,, | Performed by: OPTOMETRIST

## 2025-02-05 PROCEDURE — 92015 DETERMINE REFRACTIVE STATE: CPT | Mod: ,,, | Performed by: OPTOMETRIST

## 2025-02-05 NOTE — PROGRESS NOTES
HPI    73 Y/o female is here for routine eye exam with C/o pt say's she notices   here vision is a little more blurry in OS when reading and looking at a   distance.  Pt denies pain and discomfort   No f/f    Eye med: Systane comp. OU PRN   Last edited by Anastasia Raymundo on 2/5/2025  1:56 PM.            Assessment /Plan     For exam results, see Encounter Report.    Type 2 diabetes mellitus without retinopathy    Glaucoma screening    Presbyopia      Sp monovision IOL (OS near)--wrote spex Rx for when needed--discussed PALs, but pt does most of work on iPad so may just wish reading only  DM- WITHOUT RETINOPATHY.  Advised yearly DFE  JOSH--advised SYSTANE COMPLETE ATs TID+    PLAN:    Rtc 1 yr

## 2025-02-28 ENCOUNTER — TELEPHONE (OUTPATIENT)
Dept: DIABETES | Facility: CLINIC | Age: 75
End: 2025-02-28
Payer: COMMERCIAL

## 2025-03-21 ENCOUNTER — TELEPHONE (OUTPATIENT)
Dept: DIABETES | Facility: CLINIC | Age: 75
End: 2025-03-21
Payer: COMMERCIAL

## 2025-05-12 ENCOUNTER — TELEPHONE (OUTPATIENT)
Dept: DIABETES | Facility: CLINIC | Age: 75
End: 2025-05-12
Payer: COMMERCIAL

## 2025-05-12 NOTE — PROGRESS NOTES
CC:   Chief Complaint   Patient presents with    Diabetes Mellitus       HPI: Skip Curran is a 74 y.o. female presents for a follow up visit today for the management of T2DM.     She was diagnosed with Type 2 diabetes around 2292-7097 on routine lab work. She was initially started on Metformin and then later started insulin therapy around 2010.     Family hx of diabetes: father, and maternal grandmother   Hospitalized for diabetes: denies     No personal or personal of pancreatic cancer or pancreatitis.   + sister with thyroid cancer - unknown the kind of thyroid cancer       Our last visit was in November of 2024  At that visit we continued the metformin 500 twice a day, continue Mounjaro 15 mg weekly  , continue Omnipod 5  Cut back on her basal rate based on her download  Continue set doses with extra large meals 4-5 units  Continue the Dexcom G6  Recent A1c of 6.3% in April 2025  See attached download   Using a very small amount of insulin daily- on average 5.5 units per day   No GI upset with the Mounjaro   Open to coming off insulin therapy                                   DIABETES COMPLICATIONS: none      Diabetes Management Status    ASA:  No-- allergy to ASA- Hives     Statin: Taking- Crestor 20 mg nightly   ACE/ARB: Taking- Lisinopril 10 mg daily     The ASCVD Risk score (America DK, et al., 2019) failed to calculate for the following reasons:    The valid total cholesterol range is 130 to 320 mg/dL      Screening or Prevention Patient's value Goal Complete/Controlled?   HgA1C Testing and Control   Lab Results   Component Value Date    HGBA1C 6.3 (H) 04/09/2025      Annually/Less than 8% Yes   Lipid profile : 04/09/2025 Annually Yes   LDL control Lab Results   Component Value Date    LDLCALC 39.6 (L) 04/09/2025    Annually/Less than 100 mg/dl  Yes   Nephropathy screening Lab Results   Component Value Date    LABMICR 10.0 01/18/2024     Lab Results   Component Value Date    PROTEINUA Negative  11/21/2022    Annually No   Blood pressure BP Readings from Last 1 Encounters:   05/13/25 102/60    Less than 140/90 Yes   Dilated retinal exam : 02/05/2025- external- across the [facility] [place] [facility]- at [facility]- number [phone] Annually No   Foot exam   : 11/01/2024 Annually No       CURRENT A1C:    Hemoglobin A1C   Date Value Ref Range Status   10/25/2024 6.5 (H) 4.0 - 5.6 % Final     Comment:     ADA Screening Guidelines:  5.7-6.4%  Consistent with prediabetes  >or=6.5%  Consistent with diabetes    High levels of fetal hemoglobin interfere with the HbA1C  assay. Heterozygous hemoglobin variants (HbS, HgC, etc)do  not significantly interfere with this assay.   However, presence of multiple variants may affect accuracy.     06/04/2024 6.6 (H) 4.0 - 5.6 % Final     Comment:     ADA Screening Guidelines:  5.7-6.4%  Consistent with prediabetes  >or=6.5%  Consistent with diabetes    High levels of fetal hemoglobin interfere with the HbA1C  assay. Heterozygous hemoglobin variants (HbS, HgC, etc)do  not significantly interfere with this assay.   However, presence of multiple variants may affect accuracy.     04/05/2024 6.0 (H) 4.0 - 5.6 % Final     Comment:     ADA Screening Guidelines:  5.7-6.4%  Consistent with prediabetes  >or=6.5%  Consistent with diabetes    High levels of fetal hemoglobin interfere with the HbA1C  assay. Heterozygous hemoglobin variants (HbS, HgC, etc)do  not significantly interfere with this assay.   However, presence of multiple variants may affect accuracy.       Hemoglobin A1c   Date Value Ref Range Status   04/09/2025 6.3 (H) 4.0 - 5.6 % Final     Comment:     ADA Screening Guidelines:  5.7-6.4%  Consistent with prediabetes  >=6.5%  Consistent with diabetes    High levels of fetal hemoglobin interfere with the HbA1C  assay. Heterozygous hemoglobin variants (HbS, HgC, etc)do  not significantly interfere with this assay.   However, presence of multiple variants may  affect accuracy.       GOAL A1C: 6.5%-7% without hypoglycemia       DM MEDICATIONS USED IN THE PAST:  Metformin   Lantus, Janumet  Novolog 70/30  VGo  Dexcom   Metformin XR  Novolog   Humalog   Ozempic   Fiasp   Mounjaro -- hunter localized at injection site   Omnipod 5         CURRENT DIABETES MEDICATIONS: Metformin  mg BID  Mounjaro 15 mg weekly on Thursdays - no GI upset     Insulin Pump: Omnipod 5 with Fiasp     Pump settings:  Basal:0.5 units/hr   ICR:1:10-set doses with meals   ISF: 50  Target: 110   IOB: 4 hours       Pump site change: q 3 days   Cartridge change: q 3 days  Insulin TDD: 5.5 units    Basal 91 %   Bolus 9 %   72% in auto mode   28% manual mode   1% automated limited       Back up Lantus/Levemir: denies     Patient's pump was downloaded in clinic today and reviewed with patient.   Please see attached documents for pump download.       Insulin:  Omnipod   Missed doses: denies           BLOOD GLUCOSE MONITORING:   Sensor type: Dexcom G6 on omnipod 5 download   Site change:   q10 days  Average BG is 123  Estimated A1c is N/A  96% in range   3% high   1% low   0% very low       On dexcom download   2 week average is 121  97% in range   GMI- 6.2%     2 weeks prior   Average BG is 125   GMI 6.3%   95% in range         Dexcom supplies from pharmacy now         HYPOGLYCEMIA: rarely  --1% low   0% very low   Glucagon kit: denies - lives alone   Medic alert bracelet: denies   Drinks OJ         MEALS: eating 3 meals per day   BF: toast or bagel and coffee- with sweet and low and SF creamer   Lunch: sister in law cooks every day   Dinner: home cooked as well.   Snack: occ on peanut, occ candy   Drinks: water or diet drinks          CURRENT EXERCISE:  No        Review of Systems  Review of Systems   Constitutional:  Negative for appetite change and fatigue.   HENT:  Negative for trouble swallowing.    Eyes:  Negative for visual disturbance.   Respiratory:  Negative for shortness of breath.     Cardiovascular:  Negative for chest pain.   Gastrointestinal:  Negative for nausea.   Endocrine: Negative for polydipsia, polyphagia and polyuria.   Genitourinary:         No Nocturia    Musculoskeletal:  Positive for arthralgias (right hip), back pain and joint swelling.   Skin:  Negative for rash and wound.   Neurological:  Negative for numbness.       Physical Exam   Physical Exam  Vitals and nursing note reviewed.   Constitutional:       Appearance: She is well-developed. She is obese.   HENT:      Head: Normocephalic and atraumatic.      Right Ear: External ear normal.      Left Ear: External ear normal.      Nose: Nose normal.   Neck:      Thyroid: No thyromegaly.      Trachea: No tracheal deviation.   Cardiovascular:      Rate and Rhythm: Normal rate and regular rhythm.      Heart sounds: No murmur heard.  Pulmonary:      Effort: Pulmonary effort is normal. No respiratory distress.      Breath sounds: Normal breath sounds.   Abdominal:      Palpations: Abdomen is soft.      Tenderness: There is no abdominal tenderness.      Hernia: No hernia is present.   Musculoskeletal:      Cervical back: Normal range of motion and neck supple.   Skin:     General: Skin is warm and dry.      Capillary Refill: Capillary refill takes less than 2 seconds.      Findings: No rash.      Comments: Omnipod 5 and Dexcom sites are normal appearing. No lipo hypertropthy or atrophy  Skin irritation from mounjaro sites has improved    Neurological:      Mental Status: She is alert and oriented to person, place, and time.      Cranial Nerves: No cranial nerve deficit.   Psychiatric:         Behavior: Behavior normal.         Judgment: Judgment normal.             FOOT EXAMINATION: Appropriate footwear           Lab Results   Component Value Date    TSH 1.600 04/09/2025           Type 2 diabetes mellitus with hyperglycemia, with long-term current use of insulin  Well controlled A1c 6.3%   Very small amount of insulin therapy- average  TDD of 5.5 units on the Omnipod 5   Trial off the insulin therapy - can use SS if needed   Discussed using SGLT2 if readings are high with trialing off the insulin         -- Medication Changes:   Continue  Metformin  mg 2 times per day      Continue Mounjaro 15 mg weekly -tolerating well    Trial off the Omniopd 5 insulin pump -- barely getting any insulin in auto mode- TDD of 5.5 units   Can use Fiasp SS if needed TID AC PRN   With using correction scale:    180-230: +1 unit  231-280: +2 units  281-330: +3 units  331-380: +4 units  >380: +5 units        Start Jardiance 10 mg daily if readings start running high after trialing off the insulin therapy-- if having post prandial excursions and readings are staying above 180 after meals. Then start the Jardiance -- RX sent to pharmacy to have in case she needs to start it    -- Please let us know if you have nausea, vomiting, or weakness with a normal BG. This could be euglycemic DKA.  -- please hold medication 3 days prior to surgery or if you are sick and have decreased intake.   -- Please drink lots of water daily while on this medication. 6-8 glasses of water per day   --This medication could also give you a yeast infection- please let us know if this occurs and we can treat it.   -- no keto diet       -- Reviewed goals of therapy are to get the best control we can without hypoglycemia.  -- Reviewed patient's current insulin regimen. Clarified proper insulin dose and timing in relation to meals, etc. Insulin injection sites and proper rotation instructed.    -- Advised frequent self blood glucose monitoring.  Patient encouraged to document glucose results and bring them to every clinic visit. Continue to use Dexcom G6 ---supplies come from pharmacy- uses her phone   -- Hypoglycemia precautions discussed. Instructed on precautions before driving.    -- Call for Bg repeatedly < 70 or > 180.   -- Close adherence to lifestyle changes recommended.   -- Periodic  follow ups for eye evaluations, foot care and dental care suggested.        Patient has diabetes mellitus and manages diabetes with intensive insulin regimen and uses prandial and basal insulin daily-- Via Omnipod   Patient requires a therapeutic CGM and is willing to use therapeutic CGM for the necessary frequent adjustments of insulin therapy.  I have completed an in-person visit during the previous 6 months and will continue to have in-person visits every 6 months to assess adherence to their CGM regimen and diabetes treatment plan.  Due to COVID pandemic and need for remote monitoring this tool is medically necessary      HTN (hypertension)  BP goal is < 140/90.   Tolerating ACEi  Controlled   Blood pressure goals discussed with patient      Dyslipidemia, goal LDL below 100  On statin per ADA recommendations  LDL goal < 100. LDL at goal. LFTs WNL. Continue statin.         Class 1 obesity due to excess calories with serious comorbidity and body mass index (BMI) of 30.0 to 30.9 in adult  Body mass index is 30.21 kg/m².  Increases insulin resistance.   Discussed DM diet and exercise.       Insulin pump in place  See above             I spent a total of 30 minutes on the day of the visit.This includes face to face time and non-face to face time preparing to see the patient (eg, review of tests), obtaining and/or reviewing separately obtained history, documenting clinical information in the electronic or other health record, independently interpreting results and communicating results to the patient/family/caregiver, or care coordinator.            Pump backup plan    If the insulin pump is non functional and discontinued for anticipated more than 20 hours, please give daily injections of:   Long acting insulin Lantus  15 units daily   Short acting insulin Fiasp 5 units for meals according to carb ratios and sensitivity factor in the pump.     When the insulin pump is restarted, do not restart basal rates until at  least 22 hours after the last long acting insulin injection. You can set a 0% temporary basal setting that will last until this time and use your pump to bolus for meals and correction.     For any technical insulin pump issues, please contact the insulin pump company; the toll free number is printed on the label on the back of the insulin pump.       If your sugar is running high for a few hours and does not respond to two correction doses from the insulin pump, it may mean that you have a bad pump site and the site should be changed             Follow up in about 6 weeks (around 6/24/2025).-   Follow up with me in 6-8 weeks for dexcom download and see how readings are off the insulin   Schedule urine lab ONLY tomorrow AM please           Orders Placed This Encounter   Procedures    Microalbumin/Creatinine Ratio, Urine     Standing Status:   Future     Expected Date:   5/13/2025     Expiration Date:   11/13/2026     Specimen Source:   Urine         Recommendations were discussed with the patient in detail  The patient verbalized understanding and agrees with the plan outlined as above.     This note was partly generated with [employer] voice recognition software. I apologize for any possible typographical errors.

## 2025-05-13 ENCOUNTER — OFFICE VISIT (OUTPATIENT)
Dept: DIABETES | Facility: CLINIC | Age: 75
End: 2025-05-13
Payer: MEDICARE

## 2025-05-13 VITALS
SYSTOLIC BLOOD PRESSURE: 102 MMHG | OXYGEN SATURATION: 98 % | WEIGHT: 154.69 LBS | HEART RATE: 67 BPM | DIASTOLIC BLOOD PRESSURE: 60 MMHG | HEIGHT: 60 IN | BODY MASS INDEX: 30.37 KG/M2

## 2025-05-13 DIAGNOSIS — Z71.9 HEALTH EDUCATION/COUNSELING: ICD-10-CM

## 2025-05-13 DIAGNOSIS — E11.65 TYPE 2 DIABETES MELLITUS WITH HYPERGLYCEMIA, WITH LONG-TERM CURRENT USE OF INSULIN: Primary | ICD-10-CM

## 2025-05-13 DIAGNOSIS — Z96.41 INSULIN PUMP IN PLACE: ICD-10-CM

## 2025-05-13 DIAGNOSIS — I10 PRIMARY HYPERTENSION: ICD-10-CM

## 2025-05-13 DIAGNOSIS — E66.09 CLASS 1 OBESITY DUE TO EXCESS CALORIES WITH SERIOUS COMORBIDITY AND BODY MASS INDEX (BMI) OF 30.0 TO 30.9 IN ADULT: ICD-10-CM

## 2025-05-13 DIAGNOSIS — Z79.4 TYPE 2 DIABETES MELLITUS WITH HYPERGLYCEMIA, WITH LONG-TERM CURRENT USE OF INSULIN: Primary | ICD-10-CM

## 2025-05-13 DIAGNOSIS — E66.811 CLASS 1 OBESITY DUE TO EXCESS CALORIES WITH SERIOUS COMORBIDITY AND BODY MASS INDEX (BMI) OF 30.0 TO 30.9 IN ADULT: ICD-10-CM

## 2025-05-13 DIAGNOSIS — E78.5 DYSLIPIDEMIA, GOAL LDL BELOW 100: ICD-10-CM

## 2025-05-13 PROCEDURE — 99215 OFFICE O/P EST HI 40 MIN: CPT | Mod: PBBFAC,PN | Performed by: NURSE PRACTITIONER

## 2025-05-13 PROCEDURE — 99999 PR PBB SHADOW E&M-EST. PATIENT-LVL V: CPT | Mod: PBBFAC,,, | Performed by: NURSE PRACTITIONER

## 2025-05-13 RX ORDER — METFORMIN HYDROCHLORIDE 500 MG/1
500 TABLET, EXTENDED RELEASE ORAL 2 TIMES DAILY WITH MEALS
Qty: 180 TABLET | Refills: 2 | Status: SHIPPED | OUTPATIENT
Start: 2025-05-13 | End: 2026-08-11

## 2025-05-13 RX ORDER — BLOOD-GLUCOSE SENSOR
1 EACH MISCELLANEOUS
Qty: 3 EACH | Refills: 11 | Status: SHIPPED | OUTPATIENT
Start: 2025-05-13 | End: 2026-05-13

## 2025-05-13 RX ORDER — BLOOD-GLUCOSE TRANSMITTER
1 EACH MISCELLANEOUS
Qty: 1 EACH | Refills: 4 | Status: SHIPPED | OUTPATIENT
Start: 2025-05-13 | End: 2026-05-13

## 2025-05-13 RX ORDER — INSULIN ASPART INJECTION 100 [IU]/ML
INJECTION, SOLUTION SUBCUTANEOUS
Start: 2025-05-13

## 2025-05-13 RX ORDER — TIRZEPATIDE 15 MG/.5ML
15 INJECTION, SOLUTION SUBCUTANEOUS
Qty: 4 PEN | Refills: 11 | Status: SHIPPED | OUTPATIENT
Start: 2025-05-13

## 2025-05-13 NOTE — PATIENT INSTRUCTIONS
Jardiance 10 mg   -- Please let us know if you have nausea, vomiting, or weakness with a normal BG. This could be euglycemic DKA.  -- please hold medication 3 days prior to surgery or if you are sick and have decreased intake.   -- Please drink lots of water daily while on this medication. 6-8 glasses of water per day   --This medication could also give you a yeast infection- please let us know if this occurs and we can treat it.   -- no keto diet

## 2025-05-13 NOTE — ASSESSMENT & PLAN NOTE
Well controlled A1c 6.3%   Very small amount of insulin therapy- average TDD of 5.5 units on the Omnipod 5   Trial off the insulin therapy - can use SS if needed   Discussed using SGLT2 if readings are high with trialing off the insulin         -- Medication Changes:   Continue  Metformin  mg 2 times per day      Continue Mounjaro 15 mg weekly -tolerating well    Trial off the Omniopd 5 insulin pump -- barely getting any insulin in auto mode- TDD of 5.5 units   Can use Fiasp SS if needed TID AC PRN   With using correction scale:    180-230: +1 unit  231-280: +2 units  281-330: +3 units  331-380: +4 units  >380: +5 units        Start Jardiance 10 mg daily if readings start running high after trialing off the insulin therapy-- if having post prandial excursions and readings are staying above 180 after meals. Then start the Jardiance -- RX sent to pharmacy to have in case she needs to start it    -- Please let us know if you have nausea, vomiting, or weakness with a normal BG. This could be euglycemic DKA.  -- please hold medication 3 days prior to surgery or if you are sick and have decreased intake.   -- Please drink lots of water daily while on this medication. 6-8 glasses of water per day   --This medication could also give you a yeast infection- please let us know if this occurs and we can treat it.   -- no keto diet       -- Reviewed goals of therapy are to get the best control we can without hypoglycemia.  -- Reviewed patient's current insulin regimen. Clarified proper insulin dose and timing in relation to meals, etc. Insulin injection sites and proper rotation instructed.    -- Advised frequent self blood glucose monitoring.  Patient encouraged to document glucose results and bring them to every clinic visit. Continue to use Dexcom G6 ---supplies come from pharmacy- uses her phone   -- Hypoglycemia precautions discussed. Instructed on precautions before driving.    -- Call for Bg repeatedly < 70 or >  180.   -- Close adherence to lifestyle changes recommended.   -- Periodic follow ups for eye evaluations, foot care and dental care suggested.        Patient has diabetes mellitus and manages diabetes with intensive insulin regimen and uses prandial and basal insulin daily-- Via Omnipod   Patient requires a therapeutic CGM and is willing to use therapeutic CGM for the necessary frequent adjustments of insulin therapy.  I have completed an in-person visit during the previous 6 months and will continue to have in-person visits every 6 months to assess adherence to their CGM regimen and diabetes treatment plan.  Due to COVID pandemic and need for remote monitoring this tool is medically necessary

## 2025-05-13 NOTE — ASSESSMENT & PLAN NOTE
Body mass index is 30.21 kg/m².  Increases insulin resistance.   Discussed DM diet and exercise.

## 2025-05-14 ENCOUNTER — RESULTS FOLLOW-UP (OUTPATIENT)
Dept: DIABETES | Facility: CLINIC | Age: 75
End: 2025-05-14

## 2025-05-14 ENCOUNTER — TELEPHONE (OUTPATIENT)
Dept: DIABETES | Facility: CLINIC | Age: 75
End: 2025-05-14
Payer: COMMERCIAL

## 2025-06-09 NOTE — PROGRESS NOTES
Subjective:      Skip Curran is a 74 y.o. female who presents for evaluation of WILLIAM.      Urinary Incontinence  Patient complains of urinary incontinence. This has been present for several years. She leaks urine with standing, with a full bladder. Patient describes the symptoms as urge to urinate with little or no warning, urine leakage with coughing/heavy physical activity, and urine leaking unpredictably. Factors associated with symptoms include worsening since childbirth, associated with menopause, decreased sensation in perineum. Evaluation to date includes UA/CS: normal. Treatment to date includes Kegel exercises, which was not very effective and mirabegron 25 mg- which was beneficial  Evaluated by Urogynecology in 2021; patient reports that she was very uncomfortable with physician and suggested pelvic floor physical therapy. Dr. Thornton last note:  greater level of tissue elongation to consider M U.S. retropubic versus maybe bulking up WILLIAM element but more importantly right now we need a work on the urge element were starting better at 25 percent success will go to 50.  Specimen to find for the patient at length she will let me know via messaging system     The following portions of the patient's history were reviewed and updated as appropriate: allergies, current medications, past family history, past medical history, past social history, past surgical history and problem list.    Review of Systems  Constitutional: no fever or chills  ENT: no nasal congestion or sore throat  Respiratory: no cough or shortness of breath  Cardiovascular: no chest pain or palpitations  Gastrointestinal: no nausea or vomiting, tolerating diet  Genitourinary: as per HPI  Hematologic/Lymphatic: no easy bruising or lymphadenopathy  Musculoskeletal: no arthralgias or myalgias  Neurological: no seizures or tremors  Behavioral/Psych: no auditory or visual hallucinations     Objective:   Vital Signs:/78 (BP Location: Left  arm, Patient Position: Sitting)   Pulse 74   Ht 5' (1.524 m)   Wt 70.3 kg (154 lb 15.7 oz)   BMI 30.27 kg/m²     Physical Exam   General: alert and oriented, no acute distress  Head: normocephalic, atraumatic  Neck: supple, no lymphadenopathy, normal ROM, no masses  Respiratory: Symmetric expansion, non-labored breathing  Cardiovascular: regular rate and rhythm, nomal pulses, no peripheral edema  Skin: normal coloration and turgor, no rashes, no suspicious skin lesions noted  Neuro: alert and oriented x3, no gross deficits  Psych: normal judgment and insight, normal mood/affect, and non-anxious    Lab Review   Urinalysis demonstrates negative for all components  Lab Results   Component Value Date    WBC 6.59 04/09/2025    HGB 11.7 (L) 04/09/2025    HGB 12.4 09/10/2024    HCT 36.0 (L) 04/09/2025    HCT 38.2 09/10/2024    MCV 88 04/09/2025    MCV 87 09/10/2024     04/09/2025     09/10/2024     Lab Results   Component Value Date    CREATININE 1.0 04/09/2025    BUN 31 (H) 04/09/2025         Assessment and Plan:   1. Stress incontinence of urine  - Ambulatory referral/consult to Urology  - mirabegron (MYRBETRIQ) 25 mg Tb24 ER tablet; Take 1 tablet (25 mg total) by mouth once daily.  Dispense: 30 tablet; Refill: 11     --We discussed possible causes of urinary incontinence including infection, trauma, medications/alcohol, psychological stressors, restricted mobility, atrophic vaginitis and cognitive dysfunction.   --Will send urine for Microscopic UA and Culture; Will notify of results and treat accordingly.  --We discussed how to perform kegel exercise; instructions provided in AVS as well.     --Treat constipation as it occurs  --Trial of Myrbetriq   --May consider Urodynamics in the future  --May consider PFPT in future.  Assess voiding: voiding diary (record time and date: each void, fluid intake, urinary symptoms, pads/diapers used, and BM)  Follow up in 1 month with PVR; will discuss medication  effectiveness at that time.      This note is dictated on M*Modal word recognition program.  There are word recognition mistakes that are occasionally missed on review.

## 2025-06-10 ENCOUNTER — OFFICE VISIT (OUTPATIENT)
Dept: UROLOGY | Facility: CLINIC | Age: 75
End: 2025-06-10
Payer: MEDICARE

## 2025-06-10 VITALS
HEIGHT: 60 IN | BODY MASS INDEX: 30.43 KG/M2 | DIASTOLIC BLOOD PRESSURE: 78 MMHG | SYSTOLIC BLOOD PRESSURE: 125 MMHG | HEART RATE: 74 BPM | WEIGHT: 155 LBS

## 2025-06-10 DIAGNOSIS — R35.0 URINARY FREQUENCY: Primary | ICD-10-CM

## 2025-06-10 DIAGNOSIS — N39.3 STRESS INCONTINENCE OF URINE: ICD-10-CM

## 2025-06-10 LAB
BILIRUB SERPL-MCNC: NORMAL MG/DL
BLOOD URINE, POC: NORMAL
CLARITY, POC UA: CLEAR
COLOR, POC UA: YELLOW
GLUCOSE UR QL STRIP: NORMAL
KETONES UR QL STRIP: NORMAL
LEUKOCYTE ESTERASE URINE, POC: NORMAL
NITRITE, POC UA: NORMAL
PH, POC UA: 5
POC RESIDUAL URINE VOLUME: 0 ML (ref 0–100)
PROTEIN, POC: NORMAL
SPECIFIC GRAVITY, POC UA: 1.02
UROBILINOGEN, POC UA: NORMAL

## 2025-06-10 PROCEDURE — 81002 URINALYSIS NONAUTO W/O SCOPE: CPT | Mod: PBBFAC,PN | Performed by: NURSE PRACTITIONER

## 2025-06-10 PROCEDURE — 99999PBSHW POCT URINE DIPSTICK WITHOUT MICROSCOPE: Mod: PBBFAC,,,

## 2025-06-10 PROCEDURE — 99999 PR PBB SHADOW E&M-EST. PATIENT-LVL V: CPT | Mod: PBBFAC,,, | Performed by: NURSE PRACTITIONER

## 2025-06-10 PROCEDURE — 99999PBSHW POCT BLADDER SCAN: Mod: PBBFAC,,,

## 2025-06-10 PROCEDURE — 99215 OFFICE O/P EST HI 40 MIN: CPT | Mod: PBBFAC,PN | Performed by: NURSE PRACTITIONER

## 2025-06-10 PROCEDURE — 51798 US URINE CAPACITY MEASURE: CPT | Mod: PBBFAC,PN | Performed by: NURSE PRACTITIONER

## 2025-06-10 RX ORDER — MIRABEGRON 25 MG/1
25 TABLET, FILM COATED, EXTENDED RELEASE ORAL DAILY
Qty: 30 TABLET | Refills: 11 | Status: SHIPPED | OUTPATIENT
Start: 2025-06-10 | End: 2026-06-10

## 2025-06-13 ENCOUNTER — TELEPHONE (OUTPATIENT)
Dept: DIABETES | Facility: CLINIC | Age: 75
End: 2025-06-13
Payer: COMMERCIAL

## 2025-06-20 ENCOUNTER — TELEPHONE (OUTPATIENT)
Dept: DIABETES | Facility: CLINIC | Age: 75
End: 2025-06-20
Payer: MEDICARE

## 2025-06-23 ENCOUNTER — OFFICE VISIT (OUTPATIENT)
Dept: DIABETES | Facility: CLINIC | Age: 75
End: 2025-06-23
Payer: MEDICARE

## 2025-06-23 ENCOUNTER — TELEPHONE (OUTPATIENT)
Dept: DIABETES | Facility: CLINIC | Age: 75
End: 2025-06-23

## 2025-06-23 VITALS
OXYGEN SATURATION: 97 % | BODY MASS INDEX: 29.18 KG/M2 | SYSTOLIC BLOOD PRESSURE: 142 MMHG | WEIGHT: 148.63 LBS | DIASTOLIC BLOOD PRESSURE: 88 MMHG | HEART RATE: 87 BPM | HEIGHT: 60 IN

## 2025-06-23 DIAGNOSIS — Z79.4 TYPE 2 DIABETES MELLITUS WITH HYPERGLYCEMIA, WITH LONG-TERM CURRENT USE OF INSULIN: Primary | ICD-10-CM

## 2025-06-23 DIAGNOSIS — E78.5 DYSLIPIDEMIA, GOAL LDL BELOW 100: ICD-10-CM

## 2025-06-23 DIAGNOSIS — I10 PRIMARY HYPERTENSION: ICD-10-CM

## 2025-06-23 DIAGNOSIS — E66.3 OVERWEIGHT (BMI 25.0-29.9): ICD-10-CM

## 2025-06-23 DIAGNOSIS — E11.65 TYPE 2 DIABETES MELLITUS WITH HYPERGLYCEMIA, WITH LONG-TERM CURRENT USE OF INSULIN: Primary | ICD-10-CM

## 2025-06-23 DIAGNOSIS — Z71.9 HEALTH EDUCATION/COUNSELING: ICD-10-CM

## 2025-06-23 PROBLEM — Z46.81 INSULIN PUMP FITTING OR ADJUSTMENT: Status: RESOLVED | Noted: 2024-05-08 | Resolved: 2025-06-23

## 2025-06-23 PROBLEM — Z96.41 INSULIN PUMP IN PLACE: Status: RESOLVED | Noted: 2024-05-08 | Resolved: 2025-06-23

## 2025-06-23 PROCEDURE — 99215 OFFICE O/P EST HI 40 MIN: CPT | Mod: PBBFAC,PN | Performed by: NURSE PRACTITIONER

## 2025-06-23 PROCEDURE — 95251 CONT GLUC MNTR ANALYSIS I&R: CPT | Mod: ,,, | Performed by: NURSE PRACTITIONER

## 2025-06-23 PROCEDURE — 99214 OFFICE O/P EST MOD 30 MIN: CPT | Mod: S$PBB,,, | Performed by: NURSE PRACTITIONER

## 2025-06-23 PROCEDURE — 99999 PR PBB SHADOW E&M-EST. PATIENT-LVL V: CPT | Mod: PBBFAC,,, | Performed by: NURSE PRACTITIONER

## 2025-06-23 RX ORDER — METFORMIN HYDROCHLORIDE 500 MG/1
500 TABLET, EXTENDED RELEASE ORAL 2 TIMES DAILY WITH MEALS
Qty: 180 TABLET | Refills: 0 | Status: SHIPPED | OUTPATIENT
Start: 2025-06-23 | End: 2026-09-21

## 2025-06-23 RX ORDER — DAPAGLIFLOZIN 5 MG/1
5 TABLET, FILM COATED ORAL DAILY
Qty: 30 TABLET | Refills: 5 | Status: SHIPPED | OUTPATIENT
Start: 2025-06-23 | End: 2025-06-23 | Stop reason: SDUPTHER

## 2025-06-23 RX ORDER — BLOOD-GLUCOSE SENSOR
1 EACH MISCELLANEOUS
Qty: 3 EACH | Refills: 11 | Status: SHIPPED | OUTPATIENT
Start: 2025-06-23 | End: 2026-06-23

## 2025-06-23 RX ORDER — TIRZEPATIDE 15 MG/.5ML
15 INJECTION, SOLUTION SUBCUTANEOUS
Qty: 4 PEN | Refills: 11 | Status: SHIPPED | OUTPATIENT
Start: 2025-06-23

## 2025-06-23 RX ORDER — INSULIN ASPART INJECTION 100 [IU]/ML
INJECTION, SOLUTION SUBCUTANEOUS
Start: 2025-06-23

## 2025-06-23 RX ORDER — BLOOD-GLUCOSE TRANSMITTER
1 EACH MISCELLANEOUS
Qty: 1 EACH | Refills: 4 | Status: SHIPPED | OUTPATIENT
Start: 2025-06-23 | End: 2026-06-23

## 2025-06-23 RX ORDER — DAPAGLIFLOZIN 5 MG/1
5 TABLET, FILM COATED ORAL DAILY
Qty: 30 TABLET | Refills: 5 | Status: SHIPPED | OUTPATIENT
Start: 2025-06-23

## 2025-06-23 RX ORDER — ROSUVASTATIN CALCIUM 20 MG/1
20 TABLET, COATED ORAL NIGHTLY
Qty: 90 TABLET | Refills: 2 | Status: SHIPPED | OUTPATIENT
Start: 2025-06-23

## 2025-06-23 NOTE — TELEPHONE ENCOUNTER
----- Message from Yuni Espinoza NP sent at 6/23/2025  1:16 PM CDT -----  Farxiga PA-- tried and failed Jardiance   Jardiance caused upset stomach

## 2025-06-23 NOTE — PROGRESS NOTES
CC:   Chief Complaint   Patient presents with    Diabetes Mellitus       HPI: Skip Curran is a 74 y.o. female presents for a follow up visit today for the management of T2DM.     She was diagnosed with Type 2 diabetes around [id number] on routine lab work. She was initially started on Metformin and then later started insulin therapy around 2010.     Family hx of diabetes: father, and maternal grandmother   Hospitalized for diabetes: denies     No personal or personal of pancreatic cancer or pancreatitis.   + sister with thyroid cancer - unknown the kind of thyroid cancer       Our last visit was on May 13, 2025  At that visit we continued extended release metformin 500 twice a day  Continue Mounjaro 15 mg weekly  Trial off of Omnipod 5  Discussed sliding scale insulin if needed  Started on Jardiance 10 mg daily----it looks like an early June her PCP discontinued the Jardiance??   Continue the Dexcom G6  See attached downloads  She says that she stopped the Jardiance due to upset stomach   Diarrhea and pains in the stomach                 DIABETES COMPLICATIONS: none      Diabetes Management Status    ASA:  No-- allergy to ASA- Hives     Statin: Taking- Crestor 20 mg nightly   ACE/ARB: Taking- Lisinopril 10 mg daily     The ASCVD Risk score ([place] MAGALLON, et al., 2019) failed to calculate for the following reasons:    The valid total cholesterol range is 130 to 320 mg/dL      Screening or Prevention Patient's value Goal Complete/Controlled?   HgA1C Testing and Control   Lab Results   Component Value Date    HGBA1C 6.3 (H) 04/09/2025      Annually/Less than 8% Yes   Lipid profile : 04/09/2025 Annually Yes   LDL control Lab Results   Component Value Date    LDLCALC 39.6 (L) 04/09/2025    Annually/Less than 100 mg/dl  Yes   Nephropathy screening Lab Results   Component Value Date    LABMICR 10.0 05/14/2025     Lab Results   Component Value Date    PROTEINUA Negative 11/21/2022    Annually No   Blood pressure BP  Readings from Last 1 Encounters:   06/23/25 (!) 142/88    Less than 140/90 Yes   Dilated retinal exam : 02/05/2025- external- across the [place]- [place] - [facility]- [facility]- number [phone] Annually No   Foot exam   : 11/01/2024 Annually No       CURRENT A1C:    Hemoglobin A1C   Date Value Ref Range Status   10/25/2024 6.5 (H) 4.0 - 5.6 % Final     Comment:     ADA Screening Guidelines:  5.7-6.4%  Consistent with prediabetes  >or=6.5%  Consistent with diabetes    High levels of fetal hemoglobin interfere with the HbA1C  assay. Heterozygous hemoglobin variants (HbS, HgC, etc)do  not significantly interfere with this assay.   However, presence of multiple variants may affect accuracy.     06/04/2024 6.6 (H) 4.0 - 5.6 % Final     Comment:     ADA Screening Guidelines:  5.7-6.4%  Consistent with prediabetes  >or=6.5%  Consistent with diabetes    High levels of fetal hemoglobin interfere with the HbA1C  assay. Heterozygous hemoglobin variants (HbS, HgC, etc)do  not significantly interfere with this assay.   However, presence of multiple variants may affect accuracy.     04/05/2024 6.0 (H) 4.0 - 5.6 % Final     Comment:     ADA Screening Guidelines:  5.7-6.4%  Consistent with prediabetes  >or=6.5%  Consistent with diabetes    High levels of fetal hemoglobin interfere with the HbA1C  assay. Heterozygous hemoglobin variants (HbS, HgC, etc)do  not significantly interfere with this assay.   However, presence of multiple variants may affect accuracy.       Hemoglobin A1c   Date Value Ref Range Status   04/09/2025 6.3 (H) 4.0 - 5.6 % Final     Comment:     ADA Screening Guidelines:  5.7-6.4%  Consistent with prediabetes  >=6.5%  Consistent with diabetes    High levels of fetal hemoglobin interfere with the HbA1C  assay. Heterozygous hemoglobin variants (HbS, HgC, etc)do  not significantly interfere with this assay.   However, presence of multiple variants may affect accuracy.       GOAL A1C: 6.5%-7%  without hypoglycemia         DM MEDICATIONS USED IN THE PAST:  Metformin   Lantus, Janumet  Novolog 70/30  VGo  Dexcom   Metformin XR  Novolog   Humalog   Ozempic   Fiasp   Mounjaro -- hunter localized at injection site   Omnipod 5   Jardiance- upset stomach         CURRENT DIABETES MEDICATIONS: Metformin  mg BID  Mounjaro 15 mg weekly on Thursdays   Off the Omnipod 5 since last visit   Off Jardiance- she tried it for about 1 week -- had stomach issues?         BLOOD GLUCOSE MONITORING:   Sensor type: Dexcom G6   Site change:   q10 days  Average BG is 152  Estimated A1c is 6.9%  88% in range   12% high   0% low   0% very low       2 weeks prior   Average BG is 134  GMI 6.5%   96% in range         Dexcom supplies from pharmacy now         HYPOGLYCEMIA: rarely  --0% low   0% very low   Glucagon kit: denies - lives alone   Medic alert bracelet: denies   Drinks OJ         MEALS: eating 3 meals per day   BF: toast or bagel and coffee- with sweet and low and SF creamer   Lunch: sister in law cooks every day   Dinner: home cooked as well.   Snack: watermelon   Drinks: water or diet drinks          CURRENT EXERCISE:  No        Review of Systems  Review of Systems   Constitutional:  Negative for appetite change and fatigue.   HENT:  Negative for trouble swallowing.    Eyes:  Negative for visual disturbance.   Respiratory:  Negative for shortness of breath.    Cardiovascular:  Negative for chest pain.   Gastrointestinal:  Positive for abdominal pain and diarrhea. Negative for abdominal distention, blood in stool, constipation, nausea and vomiting.   Endocrine: Negative for polydipsia, polyphagia and polyuria.   Genitourinary:         No Nocturia    Musculoskeletal:  Positive for arthralgias (right hip), back pain and joint swelling.   Skin:  Negative for rash and wound.   Neurological:  Negative for numbness.       Physical Exam   Physical Exam  Vitals and nursing note reviewed.   Constitutional:       Appearance: She is  well-developed. She is obese.   HENT:      Head: Normocephalic and atraumatic.      Right Ear: External ear normal.      Left Ear: External ear normal.      Nose: Nose normal.   Neck:      Thyroid: No thyromegaly.      Trachea: No tracheal deviation.   Cardiovascular:      Rate and Rhythm: Normal rate and regular rhythm.      Heart sounds: No murmur heard.  Pulmonary:      Effort: Pulmonary effort is normal. No respiratory distress.      Breath sounds: Normal breath sounds.   Abdominal:      Palpations: Abdomen is soft.      Tenderness: There is no abdominal tenderness.      Hernia: No hernia is present.   Musculoskeletal:      Cervical back: Normal range of motion and neck supple.   Skin:     General: Skin is warm and dry.      Capillary Refill: Capillary refill takes less than 2 seconds.      Findings: No rash.      Comments:  Injection sites and Dexcom sites are normal appearing. No lipo hypertropthy or atrophy  Skin irritation from mounjaro sites has improved    Neurological:      Mental Status: She is alert and oriented to person, place, and time.      Cranial Nerves: No cranial nerve deficit.   Psychiatric:         Behavior: Behavior normal.         Judgment: Judgment normal.             FOOT EXAMINATION: Appropriate footwear           Lab Results   Component Value Date    TSH 1.600 04/09/2025           Type 2 diabetes mellitus with hyperglycemia, with long-term current use of insulin  Readings are still well controlled off the Omnipod 5---doing well off of insulin pump  Could be a little bit better  Strongly encouraged her to try another SGLT2   Stay off the insulin therapy - can use SS if needed   We discussed if Farxiga does not work then we can possibly maximize the metformin----she reports that she has never tried higher doses of metformin  She tried the Jardiance and reports that it did cause an upset stomach?  But I am not convinced that it was the Jardiance that was the cause?  She does not feel her  stomach symptoms are related to the Mounjaro---she said that she has been on the Mounjaro for a while and has been fine---she thinks that she may have a stomach ulcer??       -- Medication Changes:   Start Farxiga 5 mg daily  -- Please let us know if you have nausea, vomiting, or weakness with a normal BG. This could be euglycemic DKA.  -- please hold medication 3 days prior to surgery or if you are sick and have decreased intake.   -- Please drink lots of water daily while on this medication.   --This medication could also give you a yeast infection- please let us know if this occurs and we can treat it.       Continue  Metformin  mg 2 times per day ---may consider maximizing to a 1000 mg twice a day if needed     Continue Mounjaro 15 mg weekly -tolerating well      Can use Fiasp SS if needed TID AC PRN   With using correction scale:    180-230: +1 unit  231-280: +2 units  281-330: +3 units  331-380: +4 units  >380: +5 units          -- Reviewed goals of therapy are to get the best control we can without hypoglycemia.  -- Reviewed patient's current insulin regimen. Clarified proper insulin dose and timing in relation to meals, etc. Insulin injection sites and proper rotation instructed.    -- Advised frequent self blood glucose monitoring.  Patient encouraged to document glucose results and bring them to every clinic visit. Continue to use Dexcom G6 ---supplies come from pharmacy- uses her phone ----discussed changing to the Dexcom G7---she deferred and would like to stick with the Dexcom G6 at this time but will let me know if she changes her mind  -- Hypoglycemia precautions discussed. Instructed on precautions before driving.    -- Call for Bg repeatedly < 70 or > 180.   -- Close adherence to lifestyle changes recommended.   -- Periodic follow ups for eye evaluations, foot care and dental care suggested.  ---follow up with diabetes education as needed for help with the Dexcom        Patient has diabetes  mellitus and manages diabetes with intensive insulin regimen and uses prandial and basal insulin daily-- Via Omnipod   Patient requires a therapeutic CGM and is willing to use therapeutic CGM for the necessary frequent adjustments of insulin therapy.  I have completed an in-person visit during the previous 6 months and will continue to have in-person visits every 6 months to assess adherence to their CGM regimen and diabetes treatment plan.  Due to COVID pandemic and need for remote monitoring this tool is medically necessary      HTN (hypertension)  BP goal is < 140/90.   Tolerating ACEi  Blood pressure goals discussed with patient  Blood pressure above goal in clinic today---she was recently taken off of her amlodipine per her PCP--encouraged patient to monitor blood pressure at home and let us know if the top number is running above 140 in the bottom number running above 90  She verbalized understanding    Dyslipidemia, goal LDL below 100  On statin per ADA recommendations  LDL goal < 100. LDL at goal. LFTs WNL. Continue statin.         Overweight (BMI 25.0-29.9)  Body mass index is 29.02 kg/m².  Increases insulin resistance.   Discussed DM diet and exercise.           I spent a total of 30 minutes on the day of the visit.This includes face to face time and non-face to face time preparing to see the patient (eg, review of tests), obtaining and/or reviewing separately obtained history, documenting clinical information in the electronic or other health record, independently interpreting results and communicating results to the patient/family/caregiver, or care coordinator.              Follow up in about 3 months (around 9/23/2025).-   Follow up with me in 3 months with labs prior           Orders Placed This Encounter   Procedures    Hemoglobin A1C     Standing Status:   Future     Expected Date:   9/23/2025     Expiration Date:   12/23/2026    Comprehensive Metabolic Panel     Standing Status:   Future      Expected Date:   9/23/2025     Expiration Date:   12/23/2026    Lipid Panel     Standing Status:   Future     Expected Date:   9/23/2025     Expiration Date:   12/23/2026         Recommendations were discussed with the patient in detail  The patient verbalized understanding and agrees with the plan outlined as above.     This note was partly generated with [employer] voice recognition software. I apologize for any possible typographical errors.

## 2025-06-23 NOTE — ASSESSMENT & PLAN NOTE
Body mass index is 29.02 kg/m².  Increases insulin resistance.   Discussed DM diet and exercise.

## 2025-06-23 NOTE — ASSESSMENT & PLAN NOTE
Readings are still well controlled off the Omnipod 5---doing well off of insulin pump  Could be a little bit better  Strongly encouraged her to try another SGLT2   Stay off the insulin therapy - can use SS if needed   We discussed if Farxiga does not work then we can possibly maximize the metformin----she reports that she has never tried higher doses of metformin  She tried the Jardiance and reports that it did cause an upset stomach?  But I am not convinced that it was the Jardiance that was the cause?  She does not feel her stomach symptoms are related to the Mounjaro---she said that she has been on the Mounjaro for a while and has been fine---she thinks that she may have a stomach ulcer??       -- Medication Changes:   Start Farxiga 5 mg daily  -- Please let us know if you have nausea, vomiting, or weakness with a normal BG. This could be euglycemic DKA.  -- please hold medication 3 days prior to surgery or if you are sick and have decreased intake.   -- Please drink lots of water daily while on this medication.   --This medication could also give you a yeast infection- please let us know if this occurs and we can treat it.       Continue  Metformin  mg 2 times per day ---may consider maximizing to a 1000 mg twice a day if needed     Continue Mounjaro 15 mg weekly -tolerating well      Can use Fiasp SS if needed TID AC PRN   With using correction scale:    180-230: +1 unit  231-280: +2 units  281-330: +3 units  331-380: +4 units  >380: +5 units          -- Reviewed goals of therapy are to get the best control we can without hypoglycemia.  -- Reviewed patient's current insulin regimen. Clarified proper insulin dose and timing in relation to meals, etc. Insulin injection sites and proper rotation instructed.    -- Advised frequent self blood glucose monitoring.  Patient encouraged to document glucose results and bring them to every clinic visit. Continue to use Dexcom G6 ---supplies come from pharmacy- uses  her phone ----discussed changing to the Dexcom G7---she deferred and would like to stick with the Dexcom G6 at this time but will let me know if she changes her mind  -- Hypoglycemia precautions discussed. Instructed on precautions before driving.    -- Call for Bg repeatedly < 70 or > 180.   -- Close adherence to lifestyle changes recommended.   -- Periodic follow ups for eye evaluations, foot care and dental care suggested.  ---follow up with diabetes education as needed for help with the Dexcom        Patient has diabetes mellitus and manages diabetes with intensive insulin regimen and uses prandial and basal insulin daily-- Via Omnipod   Patient requires a therapeutic CGM and is willing to use therapeutic CGM for the necessary frequent adjustments of insulin therapy.  I have completed an in-person visit during the previous 6 months and will continue to have in-person visits every 6 months to assess adherence to their CGM regimen and diabetes treatment plan.  Due to COVID pandemic and need for remote monitoring this tool is medically necessary     WDL

## 2025-06-23 NOTE — ASSESSMENT & PLAN NOTE
BP goal is < 140/90.   Tolerating ACEi  Blood pressure goals discussed with patient  Blood pressure above goal in clinic today---she was recently taken off of her amlodipine per her PCP--encouraged patient to monitor blood pressure at home and let us know if the top number is running above 140 in the bottom number running above 90  She verbalized understanding

## 2025-06-24 ENCOUNTER — TELEPHONE (OUTPATIENT)
Dept: DIABETES | Facility: CLINIC | Age: 75
End: 2025-06-24
Payer: MEDICARE

## 2025-07-08 ENCOUNTER — TELEPHONE (OUTPATIENT)
Dept: DIABETES | Facility: CLINIC | Age: 75
End: 2025-07-08
Payer: MEDICARE

## 2025-07-11 ENCOUNTER — TELEPHONE (OUTPATIENT)
Dept: DIABETES | Facility: CLINIC | Age: 75
End: 2025-07-11
Payer: MEDICARE

## 2025-08-19 ENCOUNTER — TELEPHONE (OUTPATIENT)
Facility: CLINIC | Age: 75
End: 2025-08-19
Payer: MEDICARE

## 2025-08-19 ENCOUNTER — TELEPHONE (OUTPATIENT)
Dept: SURGERY | Facility: CLINIC | Age: 75
End: 2025-08-19
Payer: MEDICARE

## 2025-08-19 ENCOUNTER — NURSE TRIAGE (OUTPATIENT)
Dept: ADMINISTRATIVE | Facility: CLINIC | Age: 75
End: 2025-08-19
Payer: MEDICARE

## 2025-08-20 ENCOUNTER — OFFICE VISIT (OUTPATIENT)
Facility: CLINIC | Age: 75
End: 2025-08-20
Payer: MEDICARE

## 2025-08-20 VITALS
BODY MASS INDEX: 27.47 KG/M2 | DIASTOLIC BLOOD PRESSURE: 76 MMHG | SYSTOLIC BLOOD PRESSURE: 123 MMHG | WEIGHT: 145.5 LBS | HEIGHT: 61 IN | HEART RATE: 78 BPM

## 2025-08-20 DIAGNOSIS — K62.89 ANAL OR RECTAL PAIN: ICD-10-CM

## 2025-08-20 DIAGNOSIS — K61.2 ANORECTAL ABSCESS: ICD-10-CM

## 2025-08-20 DIAGNOSIS — K59.09 CHRONIC CONSTIPATION: ICD-10-CM

## 2025-08-20 DIAGNOSIS — K62.5 BRBPR (BRIGHT RED BLOOD PER RECTUM): ICD-10-CM

## 2025-08-20 DIAGNOSIS — K60.522 ANORECTAL FISTULA, COMPLEX, PERSISTENT: Primary | ICD-10-CM

## 2025-08-20 DIAGNOSIS — K64.1 PROLAPSED INTERNAL HEMORRHOIDS, GRADE 2: ICD-10-CM

## 2025-08-20 PROCEDURE — 46600 DIAGNOSTIC ANOSCOPY SPX: CPT | Mod: PBBFAC | Performed by: COLON & RECTAL SURGERY

## 2025-08-20 PROCEDURE — 99999 PR PBB SHADOW E&M-EST. PATIENT-LVL V: CPT | Mod: PBBFAC,,, | Performed by: COLON & RECTAL SURGERY

## 2025-08-20 PROCEDURE — 99215 OFFICE O/P EST HI 40 MIN: CPT | Mod: PBBFAC,25 | Performed by: COLON & RECTAL SURGERY

## 2025-08-22 ENCOUNTER — ANESTHESIA EVENT (OUTPATIENT)
Dept: SURGERY | Facility: HOSPITAL | Age: 75
End: 2025-08-22
Payer: MEDICARE

## 2025-08-22 RX ORDER — SODIUM CHLORIDE 9 MG/ML
INJECTION, SOLUTION INTRAVENOUS CONTINUOUS
OUTPATIENT
Start: 2025-08-22

## 2025-08-25 ENCOUNTER — HOSPITAL ENCOUNTER (OUTPATIENT)
Facility: HOSPITAL | Age: 75
Discharge: HOME | End: 2025-08-25
Attending: INTERNAL MEDICINE | Admitting: INTERNAL MEDICINE
Payer: MEDICARE

## 2025-08-25 ENCOUNTER — ANESTHESIA (OUTPATIENT)
Dept: SURGERY | Facility: HOSPITAL | Age: 75
End: 2025-08-25
Payer: MEDICARE

## 2025-08-25 VITALS
DIASTOLIC BLOOD PRESSURE: 61 MMHG | RESPIRATION RATE: 13 BRPM | BODY MASS INDEX: 28.86 KG/M2 | WEIGHT: 147 LBS | TEMPERATURE: 97 F | HEART RATE: 63 BPM | SYSTOLIC BLOOD PRESSURE: 112 MMHG | OXYGEN SATURATION: 94 % | HEIGHT: 60 IN

## 2025-08-25 DIAGNOSIS — G62.9 NEUROPATHY: ICD-10-CM

## 2025-08-25 DIAGNOSIS — K60.40 RECTAL FISTULA: Primary | ICD-10-CM

## 2025-08-25 LAB — POCT GLUCOSE: 105 MG/DL (ref 70–110)

## 2025-08-25 PROCEDURE — 71000033 HC RECOVERY, INTIAL HOUR: Performed by: COLON & RECTAL SURGERY

## 2025-08-25 PROCEDURE — 36000706: Performed by: COLON & RECTAL SURGERY

## 2025-08-25 PROCEDURE — 99900035 HC TECH TIME PER 15 MIN (STAT)

## 2025-08-25 PROCEDURE — 25000003 PHARM REV CODE 250: Performed by: STUDENT IN AN ORGANIZED HEALTH CARE EDUCATION/TRAINING PROGRAM

## 2025-08-25 PROCEDURE — 37000008 HC ANESTHESIA 1ST 15 MINUTES: Performed by: COLON & RECTAL SURGERY

## 2025-08-25 PROCEDURE — 82962 GLUCOSE BLOOD TEST: CPT | Performed by: COLON & RECTAL SURGERY

## 2025-08-25 PROCEDURE — 63600175 PHARM REV CODE 636 W HCPCS: Performed by: COLON & RECTAL SURGERY

## 2025-08-25 PROCEDURE — 46280 REMOVE ANAL FIST COMPLEX: CPT | Mod: ,,, | Performed by: COLON & RECTAL SURGERY

## 2025-08-25 PROCEDURE — 37000009 HC ANESTHESIA EA ADD 15 MINS: Performed by: COLON & RECTAL SURGERY

## 2025-08-25 PROCEDURE — 71000015 HC POSTOP RECOV 1ST HR: Performed by: COLON & RECTAL SURGERY

## 2025-08-25 PROCEDURE — 63600175 PHARM REV CODE 636 W HCPCS: Performed by: NURSE ANESTHETIST, CERTIFIED REGISTERED

## 2025-08-25 PROCEDURE — 94761 N-INVAS EAR/PLS OXIMETRY MLT: CPT

## 2025-08-25 PROCEDURE — 25000003 PHARM REV CODE 250: Performed by: NURSE ANESTHETIST, CERTIFIED REGISTERED

## 2025-08-25 PROCEDURE — 36000707: Performed by: COLON & RECTAL SURGERY

## 2025-08-25 RX ORDER — FENTANYL CITRATE 50 UG/ML
25 INJECTION, SOLUTION INTRAMUSCULAR; INTRAVENOUS EVERY 5 MIN PRN
Status: DISCONTINUED | OUTPATIENT
Start: 2025-08-25 | End: 2025-08-25 | Stop reason: HOSPADM

## 2025-08-25 RX ORDER — HYDROMORPHONE HYDROCHLORIDE 1 MG/ML
0.2 INJECTION, SOLUTION INTRAMUSCULAR; INTRAVENOUS; SUBCUTANEOUS EVERY 5 MIN PRN
Status: DISCONTINUED | OUTPATIENT
Start: 2025-08-25 | End: 2025-08-25 | Stop reason: HOSPADM

## 2025-08-25 RX ORDER — FENTANYL CITRATE 50 UG/ML
INJECTION, SOLUTION INTRAMUSCULAR; INTRAVENOUS
Status: DISCONTINUED | OUTPATIENT
Start: 2025-08-25 | End: 2025-08-27

## 2025-08-25 RX ORDER — SODIUM CHLORIDE 9 MG/ML
INJECTION, SOLUTION INTRAVENOUS CONTINUOUS
Status: DISCONTINUED | OUTPATIENT
Start: 2025-08-25 | End: 2025-08-25 | Stop reason: HOSPADM

## 2025-08-25 RX ORDER — DEXMEDETOMIDINE HYDROCHLORIDE 100 UG/ML
INJECTION, SOLUTION INTRAVENOUS
Status: DISCONTINUED | OUTPATIENT
Start: 2025-08-25 | End: 2025-08-27

## 2025-08-25 RX ORDER — PROPOFOL 10 MG/ML
VIAL (ML) INTRAVENOUS CONTINUOUS PRN
Status: DISCONTINUED | OUTPATIENT
Start: 2025-08-25 | End: 2025-08-27

## 2025-08-25 RX ORDER — GABAPENTIN 300 MG/1
300 CAPSULE ORAL 3 TIMES DAILY
Qty: 30 CAPSULE | Refills: 0 | Status: SHIPPED | OUTPATIENT
Start: 2025-08-25 | End: 2025-09-04

## 2025-08-25 RX ORDER — GABAPENTIN 300 MG/1
300 CAPSULE ORAL 3 TIMES DAILY
Qty: 30 CAPSULE | Refills: 0 | Status: SHIPPED | OUTPATIENT
Start: 2025-08-25 | End: 2025-08-25

## 2025-08-25 RX ORDER — LIDOCAINE HYDROCHLORIDE 20 MG/ML
INJECTION INTRAVENOUS
Status: DISCONTINUED | OUTPATIENT
Start: 2025-08-25 | End: 2025-08-27

## 2025-08-25 RX ORDER — LIDOCAINE HYDROCHLORIDE 10 MG/ML
INJECTION, SOLUTION EPIDURAL; INFILTRATION; INTRACAUDAL; PERINEURAL
Status: DISCONTINUED | OUTPATIENT
Start: 2025-08-25 | End: 2025-08-25 | Stop reason: HOSPADM

## 2025-08-25 RX ORDER — BUPIVACAINE HYDROCHLORIDE 2.5 MG/ML
INJECTION, SOLUTION EPIDURAL; INFILTRATION; INTRACAUDAL; PERINEURAL
Status: DISCONTINUED | OUTPATIENT
Start: 2025-08-25 | End: 2025-08-25 | Stop reason: HOSPADM

## 2025-08-25 RX ORDER — ONDANSETRON HYDROCHLORIDE 2 MG/ML
4 INJECTION, SOLUTION INTRAVENOUS DAILY PRN
Status: DISCONTINUED | OUTPATIENT
Start: 2025-08-25 | End: 2025-08-25 | Stop reason: HOSPADM

## 2025-08-25 RX ORDER — OXYCODONE HYDROCHLORIDE 5 MG/1
5 TABLET ORAL EVERY 4 HOURS PRN
Qty: 7 TABLET | Refills: 0 | Status: SHIPPED | OUTPATIENT
Start: 2025-08-25

## 2025-08-25 RX ORDER — PROCHLORPERAZINE EDISYLATE 5 MG/ML
5 INJECTION INTRAMUSCULAR; INTRAVENOUS EVERY 30 MIN PRN
Status: DISCONTINUED | OUTPATIENT
Start: 2025-08-25 | End: 2025-08-25 | Stop reason: HOSPADM

## 2025-08-25 RX ORDER — OXYCODONE AND ACETAMINOPHEN 5; 325 MG/1; MG/1
1 TABLET ORAL
Status: DISCONTINUED | OUTPATIENT
Start: 2025-08-25 | End: 2025-08-25 | Stop reason: HOSPADM

## 2025-08-25 RX ORDER — GLUCAGON 1 MG
1 KIT INJECTION
Status: DISCONTINUED | OUTPATIENT
Start: 2025-08-25 | End: 2025-08-25 | Stop reason: HOSPADM

## 2025-08-25 RX ADMIN — DEXMEDETOMIDINE HYDROCHLORIDE 8 MCG: 100 INJECTION, SOLUTION INTRAVENOUS at 08:08

## 2025-08-25 RX ADMIN — PROPOFOL 125 MCG/KG/MIN: 10 INJECTION, EMULSION INTRAVENOUS at 08:08

## 2025-08-25 RX ADMIN — SODIUM CHLORIDE: 9 INJECTION, SOLUTION INTRAVENOUS at 08:08

## 2025-08-25 RX ADMIN — FENTANYL CITRATE 25 MCG: 50 INJECTION INTRAMUSCULAR; INTRAVENOUS at 08:08

## 2025-08-25 RX ADMIN — LIDOCAINE HYDROCHLORIDE 60 MG: 20 INJECTION INTRAVENOUS at 08:08

## (undated) DEVICE — DRESSING ADAPTIC N ADH 3X8IN

## (undated) DEVICE — TAPE SILK 3IN

## (undated) DEVICE — TRAY SKIN SCRUB WET PREMIUM

## (undated) DEVICE — PENCIL ROCKER SWITCH 10FT CORD

## (undated) DEVICE — PAD CAST SPECIALIST STRL 6

## (undated) DEVICE — CLEANER TIP ELECSURG 2X2IN

## (undated) DEVICE — GLOVE BIOGEL SKINSENSE PI 7.0

## (undated) DEVICE — UNDERGLOVES BIOGEL PI SIZE 8

## (undated) DEVICE — COVER BACK TABLE 72X21

## (undated) DEVICE — TRAY GENERAL LAPAROSCOPY SMH

## (undated) DEVICE — STOCKINETTE TUBULAR 2PL 6 X 4

## (undated) DEVICE — SYR SLIP TIP 1CC

## (undated) DEVICE — BLANKET HYPER ADULT 24X60IN

## (undated) DEVICE — NDL 18GA X1 1/2 REG BEVEL

## (undated) DEVICE — SUT MCRYL PLUS 4-0 PS2 27IN

## (undated) DEVICE — CAUTERY HIGH TEMP 2IN FLEXIBLE

## (undated) DEVICE — SPONGE COTTON TRAY 4X4IN

## (undated) DEVICE — TIP YANKAUERS BULB NO VENT

## (undated) DEVICE — SOL IRR NACL .9% 3000ML

## (undated) DEVICE — ELECTRODE REM PLYHSV RETURN 9

## (undated) DEVICE — DRAPE STERI INSTRUMENT 1018

## (undated) DEVICE — DRESSING XEROFORM FOIL PK 1X8

## (undated) DEVICE — TOWEL OR DISP STRL BLUE 4/PK

## (undated) DEVICE — BLADE RECIP DOUBLE SIDED

## (undated) DEVICE — SOL ALCOHOL ISO 70% WNTGR 16OZ

## (undated) DEVICE — BLADE SAG DUAL 18MMX1.27MMX90M

## (undated) DEVICE — DUOVISC

## (undated) DEVICE — STAPLER SKIN PROXIMATE WIDE

## (undated) DEVICE — CONTAINER SPECIMEN OR STER 4OZ

## (undated) DEVICE — SEALER BIPOLAR TISSUE 6.0

## (undated) DEVICE — NDL PNEUMOPERITONEUM 150MM

## (undated) DEVICE — UNDERGLOVES BIOGEL PI SZ 7 LF

## (undated) DEVICE — Device

## (undated) DEVICE — SOL IRR NACL .9% 1000CC

## (undated) DEVICE — SUT VICRYL+ 27 UR-6 VIOL

## (undated) DEVICE — APPLICATOR STERILE 3IN

## (undated) DEVICE — SUT STRATAFIX PDS 1 CTX 18IN

## (undated) DEVICE — SEE MEDLINE ITEM 153151

## (undated) DEVICE — SYR 50CC LL

## (undated) DEVICE — UNDERGLOVES BIOGEL PI SIZE 8.5

## (undated) DEVICE — PULSAVAC ZIMMER

## (undated) DEVICE — BLADE SURG BVL ANG COAX 2.4MM

## (undated) DEVICE — TRAY FOLEY 16FR INFECTION CONT

## (undated) DEVICE — TROCAR KII FIOS ZTHREAD 12X100

## (undated) DEVICE — CUP MEDICINE GRADUATED 1OZ

## (undated) DEVICE — SOL 9P NACL IRR PIC IL

## (undated) DEVICE — SOL BETADINE 5%

## (undated) DEVICE — GLOVE BIOGEL SKINSENSE PI 7.5

## (undated) DEVICE — UNDERGLOVE BIOGEL PI SZ 6.5 LF

## (undated) DEVICE — GLOVE SENSICARE PI MICRO 7.5

## (undated) DEVICE — SUT PROLENE 6-0 P-1 18

## (undated) DEVICE — IRRIGATOR SUCTION W/TIP

## (undated) DEVICE — BNDG COFLEX FOAM LF2 ST 6X5YD

## (undated) DEVICE — MASK FLYTE HOOD PEEL AWAY

## (undated) DEVICE — SUT VICRYL PLUS 2-0 CT1 18

## (undated) DEVICE — SOL BSS BALANCED SALT

## (undated) DEVICE — BAG TISS RETRV MONARCH 10MM

## (undated) DEVICE — SEE MEDLINE ITEM 146271

## (undated) DEVICE — DROPPER MEDICINE

## (undated) DEVICE — NDL 21GA X1 1/2 REG BEVEL

## (undated) DEVICE — PENCIL SMK EVAC CONNECTOR 10FT

## (undated) DEVICE — SYR IRRIGATION BULB STER 60ML

## (undated) DEVICE — SPONGE LAP 18X18 PREWASHED

## (undated) DEVICE — SKINMARKER & RULER REGULAR X-F

## (undated) DEVICE — SOL NACL IRR 1000ML BTL

## (undated) DEVICE — GLOVE BIOGEL SKINSENSE PI 8.5

## (undated) DEVICE — SEE MEDLINE ITEM 152523

## (undated) DEVICE — SHEILD & GARTERS FOX METAL EYE

## (undated) DEVICE — CORD MPLR STR PLUG DISP 10FT

## (undated) DEVICE — TRAY MINOR GEN SURG OMC

## (undated) DEVICE — SOL NACL IRR 3000ML

## (undated) DEVICE — TROCAR ENDOPATH XCEL 5X100MM

## (undated) DEVICE — SUT VICRYL+ 1 CTX 18IN VIOL

## (undated) DEVICE — ELECTRODE L HOOK 13IN 33M

## (undated) DEVICE — DRAPE SURG W/TWL 17 5/8X23

## (undated) DEVICE — NDL STRAIGHT 4CM LEIBINGER

## (undated) DEVICE — SUT ETHIBOND EXCEL 2 V37 30

## (undated) DEVICE — SYR 0.9% NACL 10ML STERILE

## (undated) DEVICE — PROTECTOR CORNEAL CROUCH ST

## (undated) DEVICE — TOURNIQUET SB QC DP 34X4IN

## (undated) DEVICE — SYR 10CC LUER LOCK

## (undated) DEVICE — APPLICATOR CHLORAPREP ORN 26ML

## (undated) DEVICE — DRAPE LAP T SHT W/ INSTR PAD

## (undated) DEVICE — SOL IRR STRL WATER 500ML

## (undated) DEVICE — NDL 22GA X1 1/2 REG BEVEL

## (undated) DEVICE — COVER BACK TBL HD 2-TIER 72IN

## (undated) DEVICE — BLADE SAW SAG 25.40MM X 1.27MM

## (undated) DEVICE — SOL WATER STRL IRR 1000ML

## (undated) DEVICE — SPONGE GAUZE 16PLY 4X4

## (undated) DEVICE — PAD UNDERPAD 30X30

## (undated) DEVICE — BLADE REMANER PATELLA 41MM

## (undated) DEVICE — PAD ABD 8X10 STERILE

## (undated) DEVICE — PAD COLD THERAPY KNEE WRAP ON

## (undated) DEVICE — GOWN B1 X-LG X-LONG

## (undated) DEVICE — KIT TOTAL HIP OPTIVAC

## (undated) DEVICE — SEE MEDLINE ITEM 146298

## (undated) DEVICE — LUBRICANT SURGILUBE 2 OZ

## (undated) DEVICE — SUT VICRYL 2-0 8-18 CP-2

## (undated) DEVICE — TRAY MUSCLE LID EYE

## (undated) DEVICE — PRESSURIZER BN CEMENT NOZZLE

## (undated) DEVICE — DRESSING N ADH OIL EMUL 3X8

## (undated) DEVICE — DRAPE STERI-DRAPE 1000 17X11IN

## (undated) DEVICE — DISSECTOR KITTNER 5MM T 45CM S

## (undated) DEVICE — KIT PROCEDURE STER INLET CLOSU

## (undated) DEVICE — PANTIES FEMININE NAPKIN LG/XLG

## (undated) DEVICE — SYS SEE SHARP SCOPE ANTIFOG

## (undated) DEVICE — NDL HYPO 27G X 1 1/2

## (undated) DEVICE — DRAPE EXTREMITY ORTHOMAX

## (undated) DEVICE — BANDAGE MATRIX HK LOOP 6IN 5YD

## (undated) DEVICE — DRAPE INCISE IOBAN 2 23X17IN

## (undated) DEVICE — ELECTRODE MEGADYNE RETURN DUAL

## (undated) DEVICE — GOWN SURGICAL X-LARGE

## (undated) DEVICE — DRESSING N ADH OIL EMUL 3X3

## (undated) DEVICE — BLADE SURG STAINLESS STEEL #15

## (undated) DEVICE — STAPLER SKIN ROTATING HEAD

## (undated) DEVICE — SYR LUER LOCK 1CC

## (undated) DEVICE — ADHESIVE DERMABOND ADVANCED

## (undated) DEVICE — APPLIER CLIP ENDO LIGAMAX 5MM

## (undated) DEVICE — DRAPE STERI U-SHAPED 47X51IN

## (undated) DEVICE — GAUZE SPONGE 4X4 12PLY

## (undated) DEVICE — SOL NACL .9% 250ML INJ

## (undated) DEVICE — GLOVE BIOGEL SKINSENSE PI 6.5

## (undated) DEVICE — BANDAGE ESMARK ELASTIC ST 6X9

## (undated) DEVICE — INSTRUMENT SURG SUCT FRZR W/C

## (undated) DEVICE — SCISSOR 5MMX35CM DIRECT DRIVE

## (undated) DEVICE — TAPE PAPER EYE KIT

## (undated) DEVICE — DRAPE EENT SPLIT STERILE

## (undated) DEVICE — NDL HYPO A BEVEL 30X1/2

## (undated) DEVICE — SHIELD EYE METAL FOX 50/BX

## (undated) DEVICE — CANNULA ENDOPATH XCEL 5X100MM

## (undated) DEVICE — SEE L#120831

## (undated) DEVICE — BLADE SURG #15 CARBON STEEL